# Patient Record
Sex: FEMALE | Race: WHITE | NOT HISPANIC OR LATINO | Employment: OTHER | URBAN - METROPOLITAN AREA
[De-identification: names, ages, dates, MRNs, and addresses within clinical notes are randomized per-mention and may not be internally consistent; named-entity substitution may affect disease eponyms.]

---

## 2020-11-12 PROBLEM — Z12.31 BREAST CANCER SCREENING BY MAMMOGRAM: Status: ACTIVE | Noted: 2020-11-12

## 2020-11-12 PROBLEM — N32.81 OAB (OVERACTIVE BLADDER): Status: ACTIVE | Noted: 2020-11-12

## 2021-02-11 PROBLEM — K22.70 BARRETT'S ESOPHAGUS: Status: ACTIVE | Noted: 2021-02-11

## 2021-02-11 PROBLEM — K21.00 GASTROESOPHAGEAL REFLUX DISEASE WITH ESOPHAGITIS: Status: ACTIVE | Noted: 2021-02-11

## 2023-02-23 ENCOUNTER — HOSPITAL ENCOUNTER (INPATIENT)
Facility: MEDICAL CENTER | Age: 73
LOS: 17 days | DRG: 871 | End: 2023-03-12
Attending: STUDENT IN AN ORGANIZED HEALTH CARE EDUCATION/TRAINING PROGRAM | Admitting: HOSPITALIST
Payer: MEDICARE

## 2023-02-23 DIAGNOSIS — K68.12 PSOAS ABSCESS (HCC): ICD-10-CM

## 2023-02-23 DIAGNOSIS — K52.9 COLITIS: ICD-10-CM

## 2023-02-23 PROBLEM — M79.89 LEG SWELLING: Status: ACTIVE | Noted: 2023-02-23

## 2023-02-23 PROBLEM — D49.89: Status: ACTIVE | Noted: 2023-02-23

## 2023-02-23 PROBLEM — S30.1XXA HEMATOMA OF RIGHT FLANK: Status: ACTIVE | Noted: 2023-02-23

## 2023-02-23 PROBLEM — G93.40 ACUTE ENCEPHALOPATHY: Status: ACTIVE | Noted: 2023-02-23

## 2023-02-23 PROBLEM — D49.512 NEOPLASM OF LEFT KIDNEY: Status: ACTIVE | Noted: 2023-02-23

## 2023-02-23 PROBLEM — A41.9 SEPSIS (HCC): Status: ACTIVE | Noted: 2023-02-23

## 2023-02-23 LAB
ALBUMIN SERPL BCP-MCNC: 2.9 G/DL (ref 3.2–4.9)
ALBUMIN/GLOB SERPL: 0.8 G/DL
ALP SERPL-CCNC: 91 U/L (ref 30–99)
ALT SERPL-CCNC: 13 U/L (ref 2–50)
ANION GAP SERPL CALC-SCNC: 14 MMOL/L (ref 7–16)
AST SERPL-CCNC: 16 U/L (ref 12–45)
BASOPHILS # BLD AUTO: 0.2 % (ref 0–1.8)
BASOPHILS # BLD: 0.03 K/UL (ref 0–0.12)
BILIRUB SERPL-MCNC: 0.5 MG/DL (ref 0.1–1.5)
BUN SERPL-MCNC: 18 MG/DL (ref 8–22)
CALCIUM ALBUM COR SERPL-MCNC: 11 MG/DL (ref 8.5–10.5)
CALCIUM SERPL-MCNC: 10.1 MG/DL (ref 8.5–10.5)
CHLORIDE SERPL-SCNC: 98 MMOL/L (ref 96–112)
CO2 SERPL-SCNC: 20 MMOL/L (ref 20–33)
CREAT SERPL-MCNC: 0.69 MG/DL (ref 0.5–1.4)
EOSINOPHIL # BLD AUTO: 0.02 K/UL (ref 0–0.51)
EOSINOPHIL NFR BLD: 0.1 % (ref 0–6.9)
ERYTHROCYTE [DISTWIDTH] IN BLOOD BY AUTOMATED COUNT: 48.9 FL (ref 35.9–50)
GFR SERPLBLD CREATININE-BSD FMLA CKD-EPI: 92 ML/MIN/1.73 M 2
GLOBULIN SER CALC-MCNC: 3.7 G/DL (ref 1.9–3.5)
GLUCOSE SERPL-MCNC: 115 MG/DL (ref 65–99)
HCT VFR BLD AUTO: 28 % (ref 37–47)
HGB BLD-MCNC: 9.4 G/DL (ref 12–16)
IMM GRANULOCYTES # BLD AUTO: 0.19 K/UL (ref 0–0.11)
IMM GRANULOCYTES NFR BLD AUTO: 1.2 % (ref 0–0.9)
INR PPP: 1.32 (ref 0.87–1.13)
LACTATE SERPL-SCNC: 1.3 MMOL/L (ref 0.5–2)
LYMPHOCYTES # BLD AUTO: 1.07 K/UL (ref 1–4.8)
LYMPHOCYTES NFR BLD: 6.5 % (ref 22–41)
MCH RBC QN AUTO: 25.8 PG (ref 27–33)
MCHC RBC AUTO-ENTMCNC: 33.6 G/DL (ref 33.6–35)
MCV RBC AUTO: 76.9 FL (ref 81.4–97.8)
MONOCYTES # BLD AUTO: 0.95 K/UL (ref 0–0.85)
MONOCYTES NFR BLD AUTO: 5.8 % (ref 0–13.4)
NEUTROPHILS # BLD AUTO: 14.12 K/UL (ref 2–7.15)
NEUTROPHILS NFR BLD: 86.2 % (ref 44–72)
NRBC # BLD AUTO: 0 K/UL
NRBC BLD-RTO: 0 /100 WBC
PLATELET # BLD AUTO: 374 K/UL (ref 164–446)
PMV BLD AUTO: 10.9 FL (ref 9–12.9)
POTASSIUM SERPL-SCNC: 3.4 MMOL/L (ref 3.6–5.5)
PROT SERPL-MCNC: 6.6 G/DL (ref 6–8.2)
PROTHROMBIN TIME: 16.2 SEC (ref 12–14.6)
RBC # BLD AUTO: 3.64 M/UL (ref 4.2–5.4)
SODIUM SERPL-SCNC: 132 MMOL/L (ref 135–145)
WBC # BLD AUTO: 16.4 K/UL (ref 4.8–10.8)

## 2023-02-23 PROCEDURE — 770004 HCHG ROOM/CARE - ONCOLOGY PRIVATE *

## 2023-02-23 PROCEDURE — 700102 HCHG RX REV CODE 250 W/ 637 OVERRIDE(OP): Performed by: HOSPITALIST

## 2023-02-23 PROCEDURE — 99223 1ST HOSP IP/OBS HIGH 75: CPT | Performed by: HOSPITALIST

## 2023-02-23 PROCEDURE — 85610 PROTHROMBIN TIME: CPT

## 2023-02-23 PROCEDURE — 700105 HCHG RX REV CODE 258: Performed by: HOSPITALIST

## 2023-02-23 PROCEDURE — A9270 NON-COVERED ITEM OR SERVICE: HCPCS | Performed by: HOSPITALIST

## 2023-02-23 PROCEDURE — 700111 HCHG RX REV CODE 636 W/ 250 OVERRIDE (IP): Performed by: HOSPITALIST

## 2023-02-23 PROCEDURE — 80053 COMPREHEN METABOLIC PANEL: CPT | Mod: 91

## 2023-02-23 PROCEDURE — 83605 ASSAY OF LACTIC ACID: CPT

## 2023-02-23 PROCEDURE — 85025 COMPLETE CBC W/AUTO DIFF WBC: CPT | Mod: 91

## 2023-02-23 PROCEDURE — 99221 1ST HOSP IP/OBS SF/LOW 40: CPT | Performed by: SURGERY

## 2023-02-23 RX ORDER — POLYETHYLENE GLYCOL 3350 17 G/17G
1 POWDER, FOR SOLUTION ORAL
Status: DISCONTINUED | OUTPATIENT
Start: 2023-02-23 | End: 2023-02-28

## 2023-02-23 RX ORDER — MORPHINE SULFATE 4 MG/ML
2 INJECTION INTRAVENOUS
Status: DISCONTINUED | OUTPATIENT
Start: 2023-02-23 | End: 2023-02-28

## 2023-02-23 RX ORDER — OXYBUTYNIN CHLORIDE 10 MG/1
10 TABLET, EXTENDED RELEASE ORAL DAILY
Status: DISCONTINUED | OUTPATIENT
Start: 2023-02-24 | End: 2023-02-27

## 2023-02-23 RX ORDER — ACETAMINOPHEN 325 MG/1
650 TABLET ORAL EVERY 6 HOURS PRN
Status: DISCONTINUED | OUTPATIENT
Start: 2023-02-23 | End: 2023-03-12 | Stop reason: HOSPADM

## 2023-02-23 RX ORDER — OXYCODONE HYDROCHLORIDE 5 MG/1
5 TABLET ORAL
Status: DISCONTINUED | OUTPATIENT
Start: 2023-02-23 | End: 2023-02-28

## 2023-02-23 RX ORDER — SODIUM CHLORIDE, SODIUM LACTATE, POTASSIUM CHLORIDE, AND CALCIUM CHLORIDE .6; .31; .03; .02 G/100ML; G/100ML; G/100ML; G/100ML
500 INJECTION, SOLUTION INTRAVENOUS
Status: DISCONTINUED | OUTPATIENT
Start: 2023-02-23 | End: 2023-02-25

## 2023-02-23 RX ORDER — SERTRALINE HYDROCHLORIDE 100 MG/1
100 TABLET, FILM COATED ORAL 2 TIMES DAILY
Status: DISCONTINUED | OUTPATIENT
Start: 2023-02-24 | End: 2023-03-12 | Stop reason: HOSPADM

## 2023-02-23 RX ORDER — ONDANSETRON 2 MG/ML
4 INJECTION INTRAMUSCULAR; INTRAVENOUS EVERY 4 HOURS PRN
Status: DISCONTINUED | OUTPATIENT
Start: 2023-02-23 | End: 2023-03-12 | Stop reason: HOSPADM

## 2023-02-23 RX ORDER — SODIUM CHLORIDE, SODIUM LACTATE, POTASSIUM CHLORIDE, CALCIUM CHLORIDE 600; 310; 30; 20 MG/100ML; MG/100ML; MG/100ML; MG/100ML
INJECTION, SOLUTION INTRAVENOUS CONTINUOUS
Status: DISCONTINUED | OUTPATIENT
Start: 2023-02-23 | End: 2023-02-24

## 2023-02-23 RX ORDER — OXYCODONE HYDROCHLORIDE 5 MG/1
2.5 TABLET ORAL
Status: DISCONTINUED | OUTPATIENT
Start: 2023-02-23 | End: 2023-02-28

## 2023-02-23 RX ORDER — BISACODYL 10 MG
10 SUPPOSITORY, RECTAL RECTAL
Status: DISCONTINUED | OUTPATIENT
Start: 2023-02-23 | End: 2023-02-28

## 2023-02-23 RX ORDER — ONDANSETRON 4 MG/1
4 TABLET, ORALLY DISINTEGRATING ORAL EVERY 4 HOURS PRN
Status: DISCONTINUED | OUTPATIENT
Start: 2023-02-23 | End: 2023-03-12 | Stop reason: HOSPADM

## 2023-02-23 RX ORDER — FAMOTIDINE 20 MG/1
20 TABLET, FILM COATED ORAL DAILY
Status: DISCONTINUED | OUTPATIENT
Start: 2023-02-24 | End: 2023-02-24

## 2023-02-23 RX ORDER — AMOXICILLIN 250 MG
2 CAPSULE ORAL 2 TIMES DAILY
Status: DISCONTINUED | OUTPATIENT
Start: 2023-02-23 | End: 2023-02-26

## 2023-02-23 RX ADMIN — SENNOSIDES AND DOCUSATE SODIUM 2 TABLET: 50; 8.6 TABLET ORAL at 23:47

## 2023-02-23 RX ADMIN — PIPERACILLIN AND TAZOBACTAM 4.5 G: 4; .5 INJECTION, POWDER, LYOPHILIZED, FOR SOLUTION INTRAVENOUS; PARENTERAL at 23:52

## 2023-02-23 RX ADMIN — OXYCODONE 5 MG: 5 TABLET ORAL at 23:47

## 2023-02-23 ASSESSMENT — COGNITIVE AND FUNCTIONAL STATUS - GENERAL
STANDING UP FROM CHAIR USING ARMS: A LITTLE
SUGGESTED CMS G CODE MODIFIER MOBILITY: CK
DRESSING REGULAR UPPER BODY CLOTHING: A LITTLE
PERSONAL GROOMING: A LITTLE
MOVING TO AND FROM BED TO CHAIR: A LITTLE
TURNING FROM BACK TO SIDE WHILE IN FLAT BAD: A LITTLE
CLIMB 3 TO 5 STEPS WITH RAILING: A LITTLE
WALKING IN HOSPITAL ROOM: A LITTLE
TOILETING: A LITTLE
MOVING FROM LYING ON BACK TO SITTING ON SIDE OF FLAT BED: A LITTLE
SUGGESTED CMS G CODE MODIFIER DAILY ACTIVITY: CK
HELP NEEDED FOR BATHING: A LITTLE
DRESSING REGULAR LOWER BODY CLOTHING: A LOT
DAILY ACTIVITIY SCORE: 18
MOBILITY SCORE: 18

## 2023-02-23 ASSESSMENT — PAIN DESCRIPTION - PAIN TYPE: TYPE: ACUTE PAIN

## 2023-02-24 ENCOUNTER — HOSPITAL ENCOUNTER (OUTPATIENT)
Dept: RADIOLOGY | Facility: MEDICAL CENTER | Age: 73
End: 2023-02-24

## 2023-02-24 ENCOUNTER — APPOINTMENT (OUTPATIENT)
Dept: RADIOLOGY | Facility: MEDICAL CENTER | Age: 73
DRG: 871 | End: 2023-02-24
Attending: HOSPITALIST
Payer: MEDICARE

## 2023-02-24 PROBLEM — E87.6 HYPOKALEMIA: Status: ACTIVE | Noted: 2023-02-24

## 2023-02-24 PROBLEM — F10.931 ALCOHOL WITHDRAWAL SYNDROME, WITH DELIRIUM (HCC): Status: ACTIVE | Noted: 2023-02-24

## 2023-02-24 PROBLEM — G93.41 SEPSIS WITH ENCEPHALOPATHY WITHOUT SEPTIC SHOCK (HCC): Status: ACTIVE | Noted: 2023-02-23

## 2023-02-24 PROBLEM — D50.9 MICROCYTIC ANEMIA: Status: ACTIVE | Noted: 2023-02-24

## 2023-02-24 PROBLEM — E87.1 HYPONATREMIA: Status: ACTIVE | Noted: 2023-02-24

## 2023-02-24 PROBLEM — R19.00 RETROPERITONEAL MASS: Status: ACTIVE | Noted: 2023-02-24

## 2023-02-24 PROBLEM — R65.20 SEPSIS WITH ENCEPHALOPATHY WITHOUT SEPTIC SHOCK (HCC): Status: ACTIVE | Noted: 2023-02-23

## 2023-02-24 LAB
EST. AVERAGE GLUCOSE BLD GHB EST-MCNC: 111 MG/DL
HBA1C MFR BLD: 5.5 % (ref 4–5.6)
HIV 1+2 AB+HIV1 P24 AG SERPL QL IA: NORMAL
IRON SATN MFR SERPL: 8 % (ref 15–55)
IRON SERPL-MCNC: 12 UG/DL (ref 40–170)
LACTATE SERPL-SCNC: 1.1 MMOL/L (ref 0.5–2)
LACTATE SERPL-SCNC: 1.2 MMOL/L (ref 0.5–2)
LACTATE SERPL-SCNC: 1.2 MMOL/L (ref 0.5–2)
MAGNESIUM SERPL-MCNC: 1.5 MG/DL (ref 1.5–2.5)
TIBC SERPL-MCNC: 160 UG/DL (ref 250–450)
UIBC SERPL-MCNC: 148 UG/DL (ref 110–370)

## 2023-02-24 PROCEDURE — 87070 CULTURE OTHR SPECIMN AEROBIC: CPT

## 2023-02-24 PROCEDURE — 83605 ASSAY OF LACTIC ACID: CPT | Mod: 91

## 2023-02-24 PROCEDURE — 93970 EXTREMITY STUDY: CPT

## 2023-02-24 PROCEDURE — 87186 SC STD MICRODIL/AGAR DIL: CPT

## 2023-02-24 PROCEDURE — 83540 ASSAY OF IRON: CPT

## 2023-02-24 PROCEDURE — 700111 HCHG RX REV CODE 636 W/ 250 OVERRIDE (IP): Performed by: HOSPITALIST

## 2023-02-24 PROCEDURE — 87075 CULTR BACTERIA EXCEPT BLOOD: CPT

## 2023-02-24 PROCEDURE — 99232 SBSQ HOSP IP/OBS MODERATE 35: CPT | Performed by: SURGERY

## 2023-02-24 PROCEDURE — 700111 HCHG RX REV CODE 636 W/ 250 OVERRIDE (IP): Performed by: STUDENT IN AN ORGANIZED HEALTH CARE EDUCATION/TRAINING PROGRAM

## 2023-02-24 PROCEDURE — 87077 CULTURE AEROBIC IDENTIFY: CPT

## 2023-02-24 PROCEDURE — 83036 HEMOGLOBIN GLYCOSYLATED A1C: CPT

## 2023-02-24 PROCEDURE — 4410114 CT-DRAIN-RETROPERITONEAL

## 2023-02-24 PROCEDURE — 700105 HCHG RX REV CODE 258: Performed by: HOSPITALIST

## 2023-02-24 PROCEDURE — 82728 ASSAY OF FERRITIN: CPT

## 2023-02-24 PROCEDURE — 0K9N30Z DRAINAGE OF RIGHT HIP MUSCLE WITH DRAINAGE DEVICE, PERCUTANEOUS APPROACH: ICD-10-PCS | Performed by: RADIOLOGY

## 2023-02-24 PROCEDURE — 700111 HCHG RX REV CODE 636 W/ 250 OVERRIDE (IP)

## 2023-02-24 PROCEDURE — 700102 HCHG RX REV CODE 250 W/ 637 OVERRIDE(OP): Performed by: HOSPITALIST

## 2023-02-24 PROCEDURE — 700111 HCHG RX REV CODE 636 W/ 250 OVERRIDE (IP): Performed by: RADIOLOGY

## 2023-02-24 PROCEDURE — 87205 SMEAR GRAM STAIN: CPT

## 2023-02-24 PROCEDURE — 87389 HIV-1 AG W/HIV-1&-2 AB AG IA: CPT

## 2023-02-24 PROCEDURE — 770004 HCHG ROOM/CARE - ONCOLOGY PRIVATE *

## 2023-02-24 PROCEDURE — 83735 ASSAY OF MAGNESIUM: CPT

## 2023-02-24 PROCEDURE — A9270 NON-COVERED ITEM OR SERVICE: HCPCS | Performed by: HOSPITALIST

## 2023-02-24 PROCEDURE — 99233 SBSQ HOSP IP/OBS HIGH 50: CPT | Performed by: STUDENT IN AN ORGANIZED HEALTH CARE EDUCATION/TRAINING PROGRAM

## 2023-02-24 PROCEDURE — 700101 HCHG RX REV CODE 250: Performed by: STUDENT IN AN ORGANIZED HEALTH CARE EDUCATION/TRAINING PROGRAM

## 2023-02-24 PROCEDURE — HZ2ZZZZ DETOXIFICATION SERVICES FOR SUBSTANCE ABUSE TREATMENT: ICD-10-PCS | Performed by: STUDENT IN AN ORGANIZED HEALTH CARE EDUCATION/TRAINING PROGRAM

## 2023-02-24 PROCEDURE — 36415 COLL VENOUS BLD VENIPUNCTURE: CPT

## 2023-02-24 PROCEDURE — 83550 IRON BINDING TEST: CPT

## 2023-02-24 PROCEDURE — 74018 RADEX ABDOMEN 1 VIEW: CPT

## 2023-02-24 RX ORDER — OMEPRAZOLE 20 MG/1
40 CAPSULE, DELAYED RELEASE ORAL DAILY
Status: DISCONTINUED | OUTPATIENT
Start: 2023-02-25 | End: 2023-03-04

## 2023-02-24 RX ORDER — POTASSIUM CHLORIDE 20 MEQ/1
40 TABLET, EXTENDED RELEASE ORAL ONCE
Status: COMPLETED | OUTPATIENT
Start: 2023-02-24 | End: 2023-02-24

## 2023-02-24 RX ORDER — LORAZEPAM 1 MG/1
1 TABLET ORAL EVERY 4 HOURS PRN
Status: DISCONTINUED | OUTPATIENT
Start: 2023-02-24 | End: 2023-03-01

## 2023-02-24 RX ORDER — SODIUM CHLORIDE 9 MG/ML
500 INJECTION, SOLUTION INTRAVENOUS
Status: ACTIVE | OUTPATIENT
Start: 2023-02-24 | End: 2023-02-24

## 2023-02-24 RX ORDER — LORAZEPAM 2 MG/ML
0.5 INJECTION INTRAMUSCULAR EVERY 4 HOURS PRN
Status: DISCONTINUED | OUTPATIENT
Start: 2023-02-24 | End: 2023-03-01

## 2023-02-24 RX ORDER — MIDAZOLAM HYDROCHLORIDE 1 MG/ML
INJECTION INTRAMUSCULAR; INTRAVENOUS
Status: COMPLETED
Start: 2023-02-24 | End: 2023-02-24

## 2023-02-24 RX ORDER — LORAZEPAM 0.5 MG/1
0.5 TABLET ORAL EVERY 4 HOURS PRN
Status: DISCONTINUED | OUTPATIENT
Start: 2023-02-24 | End: 2023-02-25

## 2023-02-24 RX ORDER — CHOLECALCIFEROL (VITAMIN D3) 125 MCG
5 CAPSULE ORAL NIGHTLY
Status: DISCONTINUED | OUTPATIENT
Start: 2023-02-24 | End: 2023-03-12 | Stop reason: HOSPADM

## 2023-02-24 RX ORDER — LORAZEPAM 2 MG/ML
2 INJECTION INTRAMUSCULAR
Status: DISCONTINUED | OUTPATIENT
Start: 2023-02-24 | End: 2023-03-01

## 2023-02-24 RX ORDER — LORAZEPAM 2 MG/1
2 TABLET ORAL
Status: DISCONTINUED | OUTPATIENT
Start: 2023-02-24 | End: 2023-03-01

## 2023-02-24 RX ORDER — LORAZEPAM 2 MG/1
4 TABLET ORAL
Status: DISCONTINUED | OUTPATIENT
Start: 2023-02-24 | End: 2023-03-01

## 2023-02-24 RX ORDER — LORAZEPAM 2 MG/ML
1.5 INJECTION INTRAMUSCULAR
Status: DISCONTINUED | OUTPATIENT
Start: 2023-02-24 | End: 2023-03-01

## 2023-02-24 RX ORDER — QUETIAPINE FUMARATE 25 MG/1
50 TABLET, FILM COATED ORAL NIGHTLY
Status: DISCONTINUED | OUTPATIENT
Start: 2023-02-24 | End: 2023-03-12 | Stop reason: HOSPADM

## 2023-02-24 RX ORDER — MIDAZOLAM HYDROCHLORIDE 1 MG/ML
.5-2 INJECTION INTRAMUSCULAR; INTRAVENOUS PRN
Status: ACTIVE | OUTPATIENT
Start: 2023-02-24 | End: 2023-02-24

## 2023-02-24 RX ORDER — ACETAMINOPHEN 325 MG/1
650 TABLET ORAL EVERY 6 HOURS PRN
Status: DISCONTINUED | OUTPATIENT
Start: 2023-02-24 | End: 2023-02-24

## 2023-02-24 RX ORDER — FOLIC ACID 1 MG/1
1 TABLET ORAL DAILY
Status: DISPENSED | OUTPATIENT
Start: 2023-02-25 | End: 2023-03-01

## 2023-02-24 RX ORDER — LORAZEPAM 2 MG/ML
1 INJECTION INTRAMUSCULAR
Status: DISCONTINUED | OUTPATIENT
Start: 2023-02-24 | End: 2023-03-01

## 2023-02-24 RX ORDER — ONDANSETRON 2 MG/ML
4 INJECTION INTRAMUSCULAR; INTRAVENOUS PRN
Status: ACTIVE | OUTPATIENT
Start: 2023-02-24 | End: 2023-02-24

## 2023-02-24 RX ORDER — GAUZE BANDAGE 2" X 2"
100 BANDAGE TOPICAL DAILY
Status: DISPENSED | OUTPATIENT
Start: 2023-02-25 | End: 2023-03-01

## 2023-02-24 RX ADMIN — FENTANYL CITRATE 25 MCG: 50 INJECTION, SOLUTION INTRAMUSCULAR; INTRAVENOUS at 16:52

## 2023-02-24 RX ADMIN — MIDAZOLAM HYDROCHLORIDE 1 MG: 1 INJECTION, SOLUTION INTRAMUSCULAR; INTRAVENOUS at 16:55

## 2023-02-24 RX ADMIN — OXYCODONE 5 MG: 5 TABLET ORAL at 03:20

## 2023-02-24 RX ADMIN — MORPHINE SULFATE 2 MG: 4 INJECTION INTRAVENOUS at 10:07

## 2023-02-24 RX ADMIN — SERTRALINE 100 MG: 100 TABLET, FILM COATED ORAL at 05:28

## 2023-02-24 RX ADMIN — PIPERACILLIN AND TAZOBACTAM 4.5 G: 4; .5 INJECTION, POWDER, LYOPHILIZED, FOR SOLUTION INTRAVENOUS; PARENTERAL at 14:11

## 2023-02-24 RX ADMIN — PIPERACILLIN AND TAZOBACTAM 4.5 G: 4; .5 INJECTION, POWDER, LYOPHILIZED, FOR SOLUTION INTRAVENOUS; PARENTERAL at 22:28

## 2023-02-24 RX ADMIN — FENTANYL CITRATE 50 MCG: 50 INJECTION, SOLUTION INTRAMUSCULAR; INTRAVENOUS at 17:00

## 2023-02-24 RX ADMIN — MIDAZOLAM HYDROCHLORIDE 1 MG: 1 INJECTION, SOLUTION INTRAMUSCULAR; INTRAVENOUS at 16:52

## 2023-02-24 RX ADMIN — PIPERACILLIN AND TAZOBACTAM 4.5 G: 4; .5 INJECTION, POWDER, LYOPHILIZED, FOR SOLUTION INTRAVENOUS; PARENTERAL at 03:20

## 2023-02-24 RX ADMIN — POTASSIUM CHLORIDE 40 MEQ: 1500 TABLET, EXTENDED RELEASE ORAL at 05:28

## 2023-02-24 RX ADMIN — SENNOSIDES AND DOCUSATE SODIUM 2 TABLET: 50; 8.6 TABLET ORAL at 05:28

## 2023-02-24 RX ADMIN — FAMOTIDINE 20 MG: 20 TABLET, FILM COATED ORAL at 05:28

## 2023-02-24 RX ADMIN — THIAMINE HYDROCHLORIDE: 100 INJECTION, SOLUTION INTRAMUSCULAR; INTRAVENOUS at 18:25

## 2023-02-24 RX ADMIN — LORAZEPAM 0.5 MG: 2 INJECTION INTRAMUSCULAR; INTRAVENOUS at 12:15

## 2023-02-24 RX ADMIN — SODIUM CHLORIDE, POTASSIUM CHLORIDE, SODIUM LACTATE AND CALCIUM CHLORIDE: 600; 310; 30; 20 INJECTION, SOLUTION INTRAVENOUS at 00:02

## 2023-02-24 RX ADMIN — FENTANYL CITRATE 25 MCG: 50 INJECTION, SOLUTION INTRAMUSCULAR; INTRAVENOUS at 16:55

## 2023-02-24 ASSESSMENT — PATIENT HEALTH QUESTIONNAIRE - PHQ9
SUM OF ALL RESPONSES TO PHQ9 QUESTIONS 1 AND 2: 0
2. FEELING DOWN, DEPRESSED, IRRITABLE, OR HOPELESS: NOT AT ALL
1. LITTLE INTEREST OR PLEASURE IN DOING THINGS: NOT AT ALL

## 2023-02-24 ASSESSMENT — LIFESTYLE VARIABLES
AVERAGE NUMBER OF DAYS PER WEEK YOU HAVE A DRINK CONTAINING ALCOHOL: 0.5
AUDITORY DISTURBANCES: NOT PRESENT
TOTAL SCORE: 10
EVER FELT BAD OR GUILTY ABOUT YOUR DRINKING: NO
PAROXYSMAL SWEATS: NO SWEAT VISIBLE
ORIENTATION AND CLOUDING OF SENSORIUM: DATE DISORIENTATION BY MORE THAN TWO CALENDAR DAYS
DOES PATIENT WANT TO STOP DRINKING: NO
ON A TYPICAL DAY WHEN YOU DRINK ALCOHOL HOW MANY DRINKS DO YOU HAVE: 0
TOTAL SCORE: 0
EVER HAD A DRINK FIRST THING IN THE MORNING TO STEADY YOUR NERVES TO GET RID OF A HANGOVER: NO
VISUAL DISTURBANCES: NOT PRESENT
VISUAL DISTURBANCES: NOT PRESENT
HEADACHE, FULLNESS IN HEAD: NOT PRESENT
TOTAL SCORE: 4
PAROXYSMAL SWEATS: NO SWEAT VISIBLE
AGITATION: NORMAL ACTIVITY
TREMOR: *
ALCOHOL_USE: YES
HAVE YOU EVER FELT YOU SHOULD CUT DOWN ON YOUR DRINKING: NO
AGITATION: SOMEWHAT MORE THAN NORMAL ACTIVITY
NAUSEA AND VOMITING: NO NAUSEA AND NO VOMITING
ANXIETY: NO ANXIETY (AT EASE)
NAUSEA AND VOMITING: NO NAUSEA AND NO VOMITING
TOTAL SCORE: 0
ANXIETY: *
AUDITORY DISTURBANCES: NOT PRESENT
TREMOR: TREMOR NOT VISIBLE BUT CAN BE FELT, FINGERTIP TO FINGERTIP
ORIENTATION AND CLOUDING OF SENSORIUM: DATE DISORIENTATION BY MORE THAN TWO CALENDAR DAYS
HEADACHE, FULLNESS IN HEAD: NOT PRESENT
HAVE PEOPLE ANNOYED YOU BY CRITICIZING YOUR DRINKING: NO
CONSUMPTION TOTAL: NEGATIVE
TOTAL SCORE: 0
HOW MANY TIMES IN THE PAST YEAR HAVE YOU HAD 5 OR MORE DRINKS IN A DAY: 0

## 2023-02-24 ASSESSMENT — PAIN DESCRIPTION - PAIN TYPE
TYPE: ACUTE PAIN
TYPE: ACUTE PAIN

## 2023-02-24 NOTE — ASSESSMENT & PLAN NOTE
This is Sepsis Present on admission  SIRS criteria identified on my evaluation include: Fever, with temperature greater than 101 deg F, Tachypnea, with respirations greater than 20 per minute and Leukocytosis, with WBC greater than 12,000  Source is right psoas abscess  Sepsis protocol initiated  Fluid resuscitation ordered per protocol  Crystalloid Fluid Administration: Fluid resuscitation ordered per standard protocol - 30 mL/kg per current or ideal body weight  IV antibiotics as appropriate for source of sepsis  Reassessment: I have reassessed the patient's hemodynamic status    BCx 2/23: NGTD  Abscess Cx 2/24: +Citrobacter koseri  Continue zosyn for intra-abdominal abscess

## 2023-02-24 NOTE — PROGRESS NOTES
[facility] TRIAGE OFFICER DIRECT ADMISSION REPORT  Transferring facility: [facility]  Transferring physician: Dr Lopez  Transferring facility/physician contact number:   Chief complaint: Right sided flank pain and leg swelling  Pertinent history & patient course: 71 yo F s/p renal biopsy after large renal mass found presents with worsening right flank swelling. Biopsy in the past was concerning for abscess. Seen by oncologist Dr Campos today who requested repeat biopsy if mass is larger. Patient reports weight loss  Pertinent imaging & lab results: WBC 18.8, Na 127  Code Status: full code per transferring provider, I personally verified with the transferring provider patient's code status and the transferring provider has confirmed this with the patient.  Further work up or recommendations per triage officer prior to transfer: none  Consultants called prior to transfer and pertinent input from consultants: none  Patient accepted for transfer: Yes  Consultants to be called upon arrival:   Admission status: Inpatient.   Floor requested: medical  If ICU transfer, name of intensivist case discussed with and pertinent input from critical care: n/a    Please inform the triage officer upon arrival of the patient to [facility] for assignment of a hospitalist to perform admission.     For any question or concerns regarding the care of this patient, please reach out to the assigned hospitalist.

## 2023-02-24 NOTE — ASSESSMENT & PLAN NOTE
Resolved  Likely multifactorial including underlying abscess and EtOH withdrawal - see separate plans  Nonfocal, CTH deferred

## 2023-02-24 NOTE — H&P
Hospital Medicine History & Physical Note    Date of Service  2/23/2023    Primary Care Physician  Lg Mata M.D.    Consultants  general surgery    Specialist Names:      Code Status  Full Code    Chief Complaint  Right flank pain    History of Presenting Illness  Mena Campos is a 72 y.o. female who presented 2/23/2023 with past medical history of repeated urine infection who comes into the hospital for right-sided flank pain.  This has been occurring for the past 2 months.  She was found to have right-sided kidney mass on January 10.  She had a biopsy of this mass on February 6.  She went to visit an oncologist today Dr. Campos who saw the biopsy results and noticed it being an abscess.  He sent her to the emergency room right away.  Repeat CT scan was completed found a psoas abscess.  Patient is confused on my examination and the history was taken by  on the phone.  The  states that her only complaints is pain and swelling on the right side.  Patient is also not eating well at home.      I discussed the plan of care with patient.    Review of Systems  Review of Systems   Unable to perform ROS: Acuity of condition     Past Medical History   has a past medical history of Zheng's esophagus, Cataract, Depression, Fracture of humeral head, right, closed (2005), Fracture, femur closed, shaft (HCC) (8/12/2013), GERD (gastroesophageal reflux disease), H/O measles, History of chickenpox, IBD (inflammatory bowel disease), Kyphosis, OSTEOPOROSIS, and Wrist fracture, left (2004).    Surgical History   has a past surgical history that includes orif, fracture, femur (8/12/2013); orif, fracture, humerus, proximal (Right); and cataract extraction with iol (Bilateral, 3/2016).     Family History  family history includes Cancer in her father; Osteoporosis in her mother.   Family history reviewed with patient. There is no family history that is pertinent to the chief complaint.     Social  History   reports that she quit smoking about 13 years ago. Her smoking use included cigarettes. She has a 17.50 pack-year smoking history. She has never used smokeless tobacco. She reports current alcohol use of about 8.4 oz per week. She reports that she does not use drugs.    Allergies  Allergies   Allergen Reactions    Seasonal     Wellbutrin [Bupropion Hcl]      Had a siezure       Medications  Prior to Admission Medications   Prescriptions Last Dose Informant Patient Reported? Taking?   B Complex Vitamins (B COMPLEX 50) Tab   Yes No   Sig: Take 1 tablet by mouth 2 times a day.   CALCIUM-PHOSPHORUS PO  Patient Yes No   Sig: Take 500 mg by mouth 3 times a day with meals. Indications: [facility]hay labs   Cholecalciferol (VITAMIN D3) 2000 units Tab  Patient Yes No   Sig: Take 4,000 Units by mouth every day.   FIBER PO   Yes No   Sig: Take  by mouth.   METAMUCIL FIBER PO   Yes No   Sig: Take  by mouth.   Multiple Vitamin (MULTIVITAMIN PO)   Yes No   Omega-3 Fatty Acids (FISH OIL) 1200 MG Cap   Yes No   Sig: Take 2 Capsules by mouth every day.   Potassium 99 MG Tab   Yes No   Sig: Take 2 Tablets by mouth every day.   Zinc 50 MG Cap   Yes No   Sig: Take 50 mg by mouth every day.   ascorbic acid (ASCORBIC ACID) 500 MG Tab   Yes No   Sig: Take 500 mg by mouth 3 times a day.   famotidine (PEPCID) 20 MG Tab   No No   Sig: TAKE 1 TABLET BY MOUTH 3 TIMES A DAY.   nitrofurantoin (MACROBID) 100 MG Cap   No No   Sig: Take 1 Capsule by mouth 2 times a day.   oxyCODONE-acetaminophen (PERCOCET-10)  MG Tab   No No   Sig: Take 1 Tablet by mouth every 6 hours as needed for Severe Pain for up to 30 days.   sertraline (ZOLOFT) 100 MG Tab   No No   Sig: Take 1 Tablet by mouth 2 times a day.   tolterodine ER (DETROL LA) 4 MG CAPSULE SR 24 HR   No No   Sig: Take 1 Capsule by mouth 2 times a day.   vitamin e (VITAMIN E) 400 UNIT Cap   Yes No   Sig: Take 400 Units by mouth every day.      Facility-Administered Medications: None        Physical Exam  Temp:  [36.2 °C (97.1 °F)-37.7 °C (99.8 °F)] 37.2 °C (99 °F)  Pulse:  [] 112  Resp:  [15-22] 22  BP: ()/(56-92) 129/56  SpO2:  [88 %-100 %] 93 %  Blood Pressure : 129/56   Temperature: 37.2 °C (99 °F)   Pulse: (!) 112   Respiration: (!) 22   Pulse Oximetry: 93 %       Physical Exam  Vitals and nursing note reviewed.   Constitutional:       General: She is not in acute distress.     Appearance: Normal appearance. She is not ill-appearing, toxic-appearing or diaphoretic.   HENT:      Head: Normocephalic and atraumatic.      Nose: No congestion or rhinorrhea.      Mouth/Throat:      Pharynx: No oropharyngeal exudate or posterior oropharyngeal erythema.   Eyes:      General: No scleral icterus.  Neck:      Vascular: No carotid bruit or JVD.   Cardiovascular:      Rate and Rhythm: Normal rate and regular rhythm.      Pulses: Normal pulses.      Heart sounds: Normal heart sounds. No murmur heard.    No friction rub. No gallop.   Pulmonary:      Effort: Pulmonary effort is normal. No respiratory distress.      Breath sounds: No stridor. No wheezing, rhonchi or rales.   Abdominal:      General: Abdomen is flat. There is distension.      Palpations: There is no mass.      Tenderness: There is abdominal tenderness. There is guarding and rebound. There is no left CVA tenderness.      Hernia: No hernia is present.   Musculoskeletal:         General: No swelling. Normal range of motion.      Cervical back: No rigidity. No muscular tenderness.      Right lower leg: Edema present.      Left lower leg: Edema present.   Lymphadenopathy:      Cervical: No cervical adenopathy.   Skin:     General: Skin is warm and dry.      Capillary Refill: Capillary refill takes more than 3 seconds.      Coloration: Skin is not jaundiced or pale.      Findings: No bruising or erythema.   Neurological:      Mental Status: She is alert.       Laboratory:  Recent Labs     02/23/23  1555   WBC 18.8*   RBC 3.71*    HEMOGLOBIN 9.5*   HEMATOCRIT 29.1*   MCV 78.4*   MCH 25.6*   MCHC 32.6*   RDW 17.2*   PLATELETCT 378   MPV 10.5*     Recent Labs     02/23/23  1555   SODIUM 127*   POTASSIUM 4.1   CHLORIDE 92*   CO2 23   GLUCOSE 118*   BUN 22*   CREATININE 0.9   CALCIUM 11.0     Recent Labs     02/23/23  1555   ALTSGPT 17   ASTSGOT 21   ALKPHOSPHAT 104   TBILIRUBIN 0.5   GLUCOSE 118*         No results for input(s): NTPROBNP in the last 72 hours.      No results for input(s): TROPONINT in the last 72 hours.    Imaging:  [id number] VIEW    (Results Pending)   EC-ECHOCARDIOGRAM COMPLETE W/O CONT    (Results Pending)   US-EXTREMITY VENOUS LOWER BILAT    (Results Pending)   IR-DRAIN-RETROPERITONEAL    (Results Pending)       no X-Ray or EKG requiring interpretation    Assessment/Plan:  Justification for Admission Status  I anticipate this patient will require at least two midnights for appropriate medical management, necessitating inpatient admission because right psoas abscess    Patient will need a Med/Surg bed on MEDICAL service .  The need is secondary to right psoas abscess.    * Psoas abscess (HCC)  Assessment & Plan  Pain control  IV Zosyn started  Follow blood cultures  I did consult with surgery who recommended to have an IR drain in place    Leg swelling  Assessment & Plan  Venous Dopplers and cardiac echo has been ordered    Acute encephalopathy  Assessment & Plan  Secondary to infectious etiology    Sepsis (HCC)  Assessment & Plan  This is Sepsis Present on admission  SIRS criteria identified on my evaluation include: Fever, with temperature greater than 101 deg F, Tachypnea, with respirations greater than 20 per minute and Leukocytosis, with WBC greater than 12,000  Source is right psoas abscess  Sepsis protocol initiated  Fluid resuscitation ordered per protocol  Crystalloid Fluid Administration: Fluid resuscitation ordered per standard protocol - 30 mL/kg per current or ideal body weight  IV antibiotics as appropriate  for source of sepsis  Reassessment: I have reassessed the patient's hemodynamic status              VTE prophylaxis: SCDs/TEDs

## 2023-02-24 NOTE — PROGRESS NOTES
4 Eyes Skin Assessment Completed by Leela Delacruz, YARELY and Kelvin Thomas RN.    Head WDL  Ears WDL  Nose WDL  Mouth WDL  Neck WDL  Breast/Chest WDL  Shoulder Blades Redness and Blanching  Spine Redness and Blanching  (R) Arm/Elbow/Hand WDL  (L) Arm/Elbow/Hand WDL  Abdomen WDL  Groin WDL  Scrotum/Coccyx/Buttocks Redness and Blanching  (R) Leg Edema  (L) Leg Edema  (R) Heel/Foot/Toe Edema  (L) Heel/Foot/Toe Edema      Devices In Places Pulse Ox      Interventions In Place Sacral Mepilex    Possible Skin Injury No

## 2023-02-24 NOTE — DISCHARGE PLANNING
"Case Management Discharge Planning    Admission Date: 2/23/2023  GMLOS: 5  ALOS: 1    6-Clicks ADL Score: 18  6-Clicks Mobility Score: 18    Anticipated Discharge Dispo: Home with HH      DME Needed: No    Action(s) Taken: LMSW went into patients room and conducted assessment with  while patient was resting.  confirmed address and insurance.  stated that currently her doctor is Dr. Mata but that he wants to switch her to a \"Dr. Sánchez\" in [place] because [place] is not helpful for his wife.  states that he is patients support system and that they do not have anyone else.  states he would prefer to have his wife home with him than send her to any type of facility. Couple usually stays within the [facility] area.  also disclosed his own health conditions and struggles with strength.     Escalations Completed: None    Medically Clear: No    Next Steps: F/U with medical team for clearance and recommendations.     Barriers to Discharge: Medical clearance and Pending Procedures  Care Transition Team Assessment    Information Source  Orientation Level: Oriented to situation, Oriented to person, Disoriented to time, Disoriented to place  Information Given By: Spouse  Informant's Name: Nickolas    Elopement Risk  Legal Hold: No  Ambulatory or Self Mobile in Wheelchair: No-Not an Elopement Risk  Elopement Risk: Not at Risk for Elopement    Interdisciplinary Discharge Planning  Primary Care Physician: Dr Pollack / wants to switch to  in [place]  Lives with - Patient's Self Care Capacity: Spouse  Support Systems: Spouse / Significant Other  Housing / Facility: 1 Story House  Mobility Issues: Yes  Durable Medical Equipment:  (Has all DME needed)    Discharge Preparedness  What is your plan after discharge?: Home with help  What are your discharge supports?: Spouse  Prior Functional Level: Uses Cane, Uses Walker, Uses Wheelchair    Functional Assesment  Prior " Functional Level: Uses Cane, Uses Walker, Uses Wheelchair    Vision / Hearing Impairment  Vision Impairment : Yes  Right Eye Vision: Wears Glasses  Left Eye Vision: Wears Glasses  Hearing Impairment : No    Domestic Abuse  Have you ever been the victim of abuse or violence?: No  Physical Abuse or Sexual Abuse: No  Verbal Abuse or Emotional Abuse: No  Possible Abuse/Neglect Reported to:: Not Applicable    Discharge Risks or Barriers  Discharge risks or barriers?: Other (comment) (Couple does not have any support outside of each other.)  Patient risk factors: Vulnerable adult    Anticipated Discharge Information  Discharge Disposition: D/T to home under HHA care in anticipation of covered skilled care (06)

## 2023-02-24 NOTE — ASSESSMENT & PLAN NOTE
Presented to [facility] with Right flank pain  CT demonstrated psoas abscess prompting transfer to [facility] for IR  General surgery consulted and s/o, recommended IR-guided drainage  IR-guided drainage 2/24/23, Repeat CT 2/28/23  - requested RN flushes BID and record output  Abscess culture +Citrobacter koseri, pan-susceptible  Continue zosyn for now  ID consulted for antibiotic planning after source control  HIV negative    Drain discontinued 3/1  Repeat drain replaced 3/7  Drain fell out 3/10    Repeat CT AP reviewed: showed decreasing abscesses, nothing drainable per IR  Will plan for another 2 weeks of abx then follow up CT AP with ID since no PCP currently

## 2023-02-24 NOTE — DIETARY
"Nutrition services: Day 1 of admit.  Mena Campos is a 72 y.o. female admitted with Psoas abscess awaiting IR drain  History includes recent right flank pain and urinary infections, Zheng's esophagus, Depression, cataract, GERD, IBD, osteoporosis, fractures  Patient with low BMI, weight loss and decreased appetite noted on admit screen.    Patient sleeping at time of RD visit.   at bedside and stated she is in a lot of pain and sleeps with pain medicine.  He stated she weighed 115# approximately 2 months ago and has just not been able to eat recently due to pain.     Assessment:  Height and weight per ER visit chart (2/23/23)  Height 66\"  Weight: 97# per bed scale  BMI:  15.66    Diet/Intake: NPO, awaiting procedure    Evaluation:   Sodium 132, K+ 3.4  Detox IVF (D5LR, magnesium, thiamine and folic acid), thiamine, multivitamin, folic acid  Patient at risk for refeeding - lab order placed      Malnutrition Risk: Meets ASPEN criteria for severe chronic disease related malnutrition as evidenced by 15% weight loss in <3 months, severe muscle and severe fat losses, protruding clavicles, and depressed temple region.    Recommendations/Interventions/Plan:    Follow electrolytes  Resume PO diet when clinically feasible  Monitor weight.  Nutrition rep will continue to see patient for ongoing meal and snack preferences.     RD following    "

## 2023-02-24 NOTE — PROGRESS NOTES
ACS Staff    HD #2, right psoas abscess.    /64   Pulse 99   Temp 37.7 °C (99.9 °F) (Temporal)   Resp 20   SpO2 98%   Cachectic appearing, NAD  Abd soft, ND. Mildly TTP in RLQ.    WBC 16    CT showed multi loculated right psoas abscess.    A/P: Recommend IR drain. No plans for surgery.  ACS Blue service will sign off. Please call with questions.     Tariq Israel MD  [phone]

## 2023-02-24 NOTE — CARE PLAN
The patient is Watcher - Medium risk of patient condition declining or worsening    Shift Goals  Clinical Goals: pain control  Patient Goals: pain control    Progress made toward(s) clinical / shift goals:    Problem: Pain - Standard  Goal: Alleviation of pain or a reduction in pain to the patient’s comfort goal  Outcome: Progressing  Note: Pain tolerated with PRN medications       Patient is not progressing towards the following goals:  Problem: Knowledge Deficit - Standard  Goal: Patient and family/care givers will demonstrate understanding of plan of care, disease process/condition, diagnostic tests and medications  Outcome: Not Progressing  Note: Pt is oriented to self and place. She is intermittently oriented to time and situation. Overall is very anxious and does not understand plan of care

## 2023-02-24 NOTE — CONSULTS
CHIEF COMPLAINT: Psoas abscess    Consult requested by Dr. Cr from the hospitalist service.     HISTORY OF PRESENT ILLNESS: The patient is a 72 year-old White elderly woman who was transferred from [facility] for management of a large psoas abscess.  She has been having subacute/chronic right flank pain and urinary infections.  She originally thought to have a kidney mass which was biopsied on February 6.  Pathology report was more consistent with inflammatory tissue/abscess so she was sent to the emergency room for admission to the hospital.  Patient has intermittently had significant abdominal discomfort but is relatively comfortable at this time.  She has experienced weight loss and poor appetite over the last few months.  Intermittent fevers and chills are reported as well.    PAST MEDICAL HISTORY:  has a past medical history of Zheng's esophagus, Cataract, Depression, Fracture of humeral head, right, closed (2005), Fracture, femur closed, shaft (HCC) (8/12/2013), GERD (gastroesophageal reflux disease), H/O measles, History of chickenpox, IBD (inflammatory bowel disease), Kyphosis, OSTEOPOROSIS, and Wrist fracture, left (2004).    PAST SURGICAL HISTORY:  has a past surgical history that includes orif, fracture, femur (8/12/2013); orif, fracture, humerus, proximal (Right); and cataract extraction with iol (Bilateral, 3/2016).    ALLERGIES:   Allergies   Allergen Reactions    Seasonal     Wellbutrin [Bupropion Hcl]      Had a siezure       CURRENT MEDICATIONS:   Home Medications    **Home medications have not yet been reviewed for this encounter**       FAMILY HISTORY: family history includes Cancer in her father; Osteoporosis in her mother.    SOCIAL HISTORY:  reports that she quit smoking about 13 years ago. Her smoking use included cigarettes. She has a 17.50 pack-year smoking history. She has never used smokeless tobacco. She reports current alcohol use of about 8.4 oz per week. She reports that  she does not use drugs.    REVIEW OF SYSTEMS: Review of systems is remarkable for the following right flank and abdominal pain, fevers chills, weight loss and anorexia.. The remainder of the comprehensive ROS is negative with the exception of the aforementioned HPI, PMH, and PSH bullets in accordance with CMS guideline.    PHYSICAL EXAMINATION:      Vital Signs: /56   Pulse (!) 112   Temp 37.2 °C (99 °F) (Temporal)   Resp (!) 22   SpO2 93%   Physical Exam  Vitals and nursing note reviewed.   Constitutional:       Appearance: She is cachectic.   HENT:      Head: Normocephalic and atraumatic.      Nose: Nose normal.      Mouth/Throat:      Mouth: Mucous membranes are moist.      Pharynx: Oropharynx is clear.   Eyes:      Extraocular Movements: Extraocular movements intact.      Conjunctiva/sclera: Conjunctivae normal.   Neck:      Comments: Trachea midline  Cardiovascular:      Rate and Rhythm: Regular rhythm. Tachycardia present.      Pulses: Normal pulses.   Pulmonary:      Effort: Pulmonary effort is normal.      Breath sounds: No stridor. No wheezing.   Abdominal:      General: There is no distension.      Palpations: Abdomen is soft.      Comments: Mild tenderness over a large right-sided abdominal mass   Musculoskeletal:         General: Normal range of motion.      Cervical back: Normal range of motion.      Right lower leg: No edema.      Left lower leg: No edema.   Skin:     General: Skin is warm and dry.      Coloration: Skin is pale.   Neurological:      General: No focal deficit present.      Mental Status: She is alert. She is disoriented.       LABORATORY VALUES:   Recent Labs     02/23/23  1555 02/23/23 2259   WBC 18.8* 16.4*   RBC 3.71* 3.64*   HEMOGLOBIN 9.5* 9.4*   HEMATOCRIT 29.1* 28.0*   MCV 78.4* 76.9*   MCH 25.6* 25.8*   MCHC 32.6* 33.6   RDW 17.2* 48.9   PLATELETCT 378 374   MPV 10.5* 10.9     Recent Labs     02/23/23  1555 02/23/23  2259   SODIUM 127* 132*   POTASSIUM 4.1 3.4*    CHLORIDE 92* 98   CO2 23 20   GLUCOSE 118* 115*   BUN 22* 18   CREATININE 0.9 0.69   CALCIUM 11.0 10.1     Recent Labs     02/23/23  1555 02/23/23  2259   ASTSGOT 21 16   ALTSGPT 17 13   TBILIRUBIN 0.5 0.5   ALKPHOSPHAT 104 91   GLOBULIN  --  3.7*   INR  --  1.32*     Recent Labs     02/23/23  2259   INR 1.32*        IMAGING:   [id number] VIEW    (Results Pending)   EC-ECHOCARDIOGRAM COMPLETE W/O CONT    (Results Pending)   US-EXTREMITY VENOUS LOWER BILAT    (Results Pending)   IR-DRAIN-RETROPERITONEAL    (Results Pending)       ASSESSMENT AND PLAN:   72-year-old female with large right-sided psoas abscess that may be fairly chronic.  In any case it is associated with mild sepsis as well as multiple constitutional manifestations.    Recommend IR drainage of her psoas abscess.  It is relatively rare to require open drainage. N.p.o. and hold Lovenox until drainage has been done. She should have broad-spectrum antibiotics until cultures are back.     Sepsis management and resuscitation is being managed by the hospitalist.    Recommend dietary assessment and fairly aggressive nutritional supplementation.     Please contact the surgical service if there is any question or concern.    Psoas abscess (HCC)  Pain control  IV Zosyn started  Follow blood cultures  I did consult with surgery who recommended to have an IR drain in place    Sepsis (HCC)  This is Sepsis Present on admission  SIRS criteria identified on my evaluation include: Fever, with temperature greater than 101 deg F, Tachypnea, with respirations greater than 20 per minute and Leukocytosis, with WBC greater than 12,000  Source is right psoas abscess  Sepsis protocol initiated  Fluid resuscitation ordered per protocol  Crystalloid Fluid Administration: Fluid resuscitation ordered per standard protocol - 30 mL/kg per current or ideal body weight  IV antibiotics as appropriate for source of sepsis  Reassessment: I have reassessed the patient's hemodynamic  status          Acute encephalopathy  Secondary to infectious etiology    Leg swelling  Venous Dopplers and cardiac echo has been ordered         ____________________________________     Michael Arreaga D.O.    DD: 2/23/2023  11:44 PM

## 2023-02-25 ENCOUNTER — APPOINTMENT (OUTPATIENT)
Dept: RADIOLOGY | Facility: MEDICAL CENTER | Age: 73
DRG: 871 | End: 2023-02-25
Attending: STUDENT IN AN ORGANIZED HEALTH CARE EDUCATION/TRAINING PROGRAM
Payer: MEDICARE

## 2023-02-25 PROBLEM — R13.19 OTHER DYSPHAGIA: Status: ACTIVE | Noted: 2023-02-25

## 2023-02-25 LAB
ALBUMIN SERPL BCP-MCNC: 2.4 G/DL (ref 3.2–4.9)
ALBUMIN/GLOB SERPL: 0.7 G/DL
ALP SERPL-CCNC: 98 U/L (ref 30–99)
ALT SERPL-CCNC: 13 U/L (ref 2–50)
ANION GAP SERPL CALC-SCNC: 13 MMOL/L (ref 7–16)
AST SERPL-CCNC: 26 U/L (ref 12–45)
BASOPHILS # BLD AUTO: 0.4 % (ref 0–1.8)
BASOPHILS # BLD: 0.05 K/UL (ref 0–0.12)
BILIRUB SERPL-MCNC: 0.3 MG/DL (ref 0.1–1.5)
BUN SERPL-MCNC: 14 MG/DL (ref 8–22)
CALCIUM ALBUM COR SERPL-MCNC: 10.2 MG/DL (ref 8.5–10.5)
CALCIUM SERPL-MCNC: 8.9 MG/DL (ref 8.5–10.5)
CHLORIDE SERPL-SCNC: 107 MMOL/L (ref 96–112)
CO2 SERPL-SCNC: 18 MMOL/L (ref 20–33)
CREAT SERPL-MCNC: 0.88 MG/DL (ref 0.5–1.4)
EOSINOPHIL # BLD AUTO: 0.03 K/UL (ref 0–0.51)
EOSINOPHIL NFR BLD: 0.2 % (ref 0–6.9)
ERYTHROCYTE [DISTWIDTH] IN BLOOD BY AUTOMATED COUNT: 51.4 FL (ref 35.9–50)
FERRITIN SERPL-MCNC: 1040 NG/ML (ref 10–291)
GFR SERPLBLD CREATININE-BSD FMLA CKD-EPI: 70 ML/MIN/1.73 M 2
GLOBULIN SER CALC-MCNC: 3.5 G/DL (ref 1.9–3.5)
GLUCOSE BLD STRIP.AUTO-MCNC: 107 MG/DL (ref 65–99)
GLUCOSE SERPL-MCNC: 115 MG/DL (ref 65–99)
GRAM STN SPEC: NORMAL
HCT VFR BLD AUTO: 28.1 % (ref 37–47)
HGB BLD-MCNC: 9.1 G/DL (ref 12–16)
IMM GRANULOCYTES # BLD AUTO: 0.12 K/UL (ref 0–0.11)
IMM GRANULOCYTES NFR BLD AUTO: 0.9 % (ref 0–0.9)
LYMPHOCYTES # BLD AUTO: 1.08 K/UL (ref 1–4.8)
LYMPHOCYTES NFR BLD: 7.9 % (ref 22–41)
MAGNESIUM SERPL-MCNC: 1.9 MG/DL (ref 1.5–2.5)
MCH RBC QN AUTO: 25.9 PG (ref 27–33)
MCHC RBC AUTO-ENTMCNC: 32.4 G/DL (ref 33.6–35)
MCV RBC AUTO: 79.8 FL (ref 81.4–97.8)
MONOCYTES # BLD AUTO: 0.69 K/UL (ref 0–0.85)
MONOCYTES NFR BLD AUTO: 5 % (ref 0–13.4)
NEUTROPHILS # BLD AUTO: 11.76 K/UL (ref 2–7.15)
NEUTROPHILS NFR BLD: 85.6 % (ref 44–72)
NRBC # BLD AUTO: 0 K/UL
NRBC BLD-RTO: 0 /100 WBC
PHOSPHATE SERPL-MCNC: 3.4 MG/DL (ref 2.5–4.5)
PLATELET # BLD AUTO: 384 K/UL (ref 164–446)
PMV BLD AUTO: 10.5 FL (ref 9–12.9)
POTASSIUM SERPL-SCNC: 3.3 MMOL/L (ref 3.6–5.5)
PROT SERPL-MCNC: 5.9 G/DL (ref 6–8.2)
RBC # BLD AUTO: 3.52 M/UL (ref 4.2–5.4)
SIGNIFICANT IND 70042: NORMAL
SITE SITE: NORMAL
SODIUM SERPL-SCNC: 138 MMOL/L (ref 135–145)
SOURCE SOURCE: NORMAL
WBC # BLD AUTO: 13.7 K/UL (ref 4.8–10.8)

## 2023-02-25 PROCEDURE — 82962 GLUCOSE BLOOD TEST: CPT

## 2023-02-25 PROCEDURE — 700105 HCHG RX REV CODE 258: Performed by: STUDENT IN AN ORGANIZED HEALTH CARE EDUCATION/TRAINING PROGRAM

## 2023-02-25 PROCEDURE — 700101 HCHG RX REV CODE 250: Performed by: STUDENT IN AN ORGANIZED HEALTH CARE EDUCATION/TRAINING PROGRAM

## 2023-02-25 PROCEDURE — 83735 ASSAY OF MAGNESIUM: CPT

## 2023-02-25 PROCEDURE — 700111 HCHG RX REV CODE 636 W/ 250 OVERRIDE (IP): Performed by: HOSPITALIST

## 2023-02-25 PROCEDURE — 36415 COLL VENOUS BLD VENIPUNCTURE: CPT

## 2023-02-25 PROCEDURE — 85025 COMPLETE CBC W/AUTO DIFF WBC: CPT

## 2023-02-25 PROCEDURE — 99232 SBSQ HOSP IP/OBS MODERATE 35: CPT | Performed by: STUDENT IN AN ORGANIZED HEALTH CARE EDUCATION/TRAINING PROGRAM

## 2023-02-25 PROCEDURE — 700111 HCHG RX REV CODE 636 W/ 250 OVERRIDE (IP): Performed by: STUDENT IN AN ORGANIZED HEALTH CARE EDUCATION/TRAINING PROGRAM

## 2023-02-25 PROCEDURE — 80053 COMPREHEN METABOLIC PANEL: CPT

## 2023-02-25 PROCEDURE — 700105 HCHG RX REV CODE 258: Performed by: HOSPITALIST

## 2023-02-25 PROCEDURE — 92610 EVALUATE SWALLOWING FUNCTION: CPT

## 2023-02-25 PROCEDURE — 84100 ASSAY OF PHOSPHORUS: CPT

## 2023-02-25 PROCEDURE — 770004 HCHG ROOM/CARE - ONCOLOGY PRIVATE *

## 2023-02-25 RX ORDER — SODIUM CHLORIDE 9 MG/ML
1000 INJECTION, SOLUTION INTRAVENOUS ONCE
Status: COMPLETED | OUTPATIENT
Start: 2023-02-25 | End: 2023-02-25

## 2023-02-25 RX ORDER — SODIUM CHLORIDE AND POTASSIUM CHLORIDE 300; 900 MG/100ML; MG/100ML
1000 INJECTION, SOLUTION INTRAVENOUS ONCE
Status: COMPLETED | OUTPATIENT
Start: 2023-02-25 | End: 2023-02-25

## 2023-02-25 RX ORDER — SODIUM CHLORIDE, SODIUM LACTATE, POTASSIUM CHLORIDE, CALCIUM CHLORIDE 600; 310; 30; 20 MG/100ML; MG/100ML; MG/100ML; MG/100ML
INJECTION, SOLUTION INTRAVENOUS ONCE
Status: DISCONTINUED | OUTPATIENT
Start: 2023-02-25 | End: 2023-02-25

## 2023-02-25 RX ORDER — LORAZEPAM 2 MG/ML
0.5 INJECTION INTRAMUSCULAR EVERY 4 HOURS PRN
Status: DISCONTINUED | OUTPATIENT
Start: 2023-02-25 | End: 2023-03-01

## 2023-02-25 RX ORDER — LORAZEPAM 0.5 MG/1
0.5 TABLET ORAL EVERY 4 HOURS PRN
Status: DISCONTINUED | OUTPATIENT
Start: 2023-02-25 | End: 2023-03-01

## 2023-02-25 RX ADMIN — PIPERACILLIN AND TAZOBACTAM 4.5 G: 4; .5 INJECTION, POWDER, LYOPHILIZED, FOR SOLUTION INTRAVENOUS; PARENTERAL at 21:28

## 2023-02-25 RX ADMIN — MORPHINE SULFATE 2 MG: 4 INJECTION INTRAVENOUS at 08:48

## 2023-02-25 RX ADMIN — POTASSIUM CHLORIDE AND SODIUM CHLORIDE 1000 ML: 900; 300 INJECTION, SOLUTION INTRAVENOUS at 17:12

## 2023-02-25 RX ADMIN — LORAZEPAM 0.5 MG: 2 INJECTION INTRAMUSCULAR; INTRAVENOUS at 17:29

## 2023-02-25 RX ADMIN — PIPERACILLIN AND TAZOBACTAM 4.5 G: 4; .5 INJECTION, POWDER, LYOPHILIZED, FOR SOLUTION INTRAVENOUS; PARENTERAL at 06:14

## 2023-02-25 RX ADMIN — SODIUM CHLORIDE 1000 ML: 9 INJECTION, SOLUTION INTRAVENOUS at 08:41

## 2023-02-25 RX ADMIN — PIPERACILLIN AND TAZOBACTAM 4.5 G: 4; .5 INJECTION, POWDER, LYOPHILIZED, FOR SOLUTION INTRAVENOUS; PARENTERAL at 13:00

## 2023-02-25 RX ADMIN — MORPHINE SULFATE 2 MG: 4 INJECTION INTRAVENOUS at 17:30

## 2023-02-25 RX ADMIN — LORAZEPAM 0.5 MG: 2 INJECTION INTRAMUSCULAR; INTRAVENOUS at 21:24

## 2023-02-25 RX ADMIN — LORAZEPAM 1 MG: 2 INJECTION INTRAMUSCULAR; INTRAVENOUS at 10:53

## 2023-02-25 ASSESSMENT — LIFESTYLE VARIABLES
AGITATION: NORMAL ACTIVITY
ANXIETY: MILDLY ANXIOUS
VISUAL DISTURBANCES: NOT PRESENT
HEADACHE, FULLNESS IN HEAD: NOT PRESENT
ANXIETY: MILDLY ANXIOUS
AUDITORY DISTURBANCES: NOT PRESENT
ORIENTATION AND CLOUDING OF SENSORIUM: DATE DISORIENTATION BY MORE THAN TWO CALENDAR DAYS
TOTAL SCORE: 12
AGITATION: *
AUDITORY DISTURBANCES: NOT PRESENT
PAROXYSMAL SWEATS: NO SWEAT VISIBLE
HEADACHE, FULLNESS IN HEAD: NOT PRESENT
AUDITORY DISTURBANCES: NOT PRESENT
ORIENTATION AND CLOUDING OF SENSORIUM: DATE DISORIENTATION BY MORE THAN TWO CALENDAR DAYS
NAUSEA AND VOMITING: NO NAUSEA AND NO VOMITING
HEADACHE, FULLNESS IN HEAD: NOT PRESENT
TREMOR: *
AGITATION: SOMEWHAT MORE THAN NORMAL ACTIVITY
NAUSEA AND VOMITING: NO NAUSEA AND NO VOMITING
TREMOR: *
ANXIETY: NO ANXIETY (AT EASE)
VISUAL DISTURBANCES: NOT PRESENT
TREMOR: TREMOR NOT VISIBLE BUT CAN BE FELT, FINGERTIP TO FINGERTIP
TOTAL SCORE: 4
ORIENTATION AND CLOUDING OF SENSORIUM: DATE DISORIENTATION BY MORE THAN TWO CALENDAR DAYS
ORIENTATION AND CLOUDING OF SENSORIUM: ORIENTED AND CAN DO SERIAL ADDITIONS
ANXIETY: NO ANXIETY (AT EASE)
TOTAL SCORE: 5
PAROXYSMAL SWEATS: NO SWEAT VISIBLE
ANXIETY: NO ANXIETY (AT EASE)
NAUSEA AND VOMITING: NO NAUSEA AND NO VOMITING
TOTAL SCORE: 5
NAUSEA AND VOMITING: NO NAUSEA AND NO VOMITING
PAROXYSMAL SWEATS: NO SWEAT VISIBLE
VISUAL DISTURBANCES: NOT PRESENT
AGITATION: SOMEWHAT MORE THAN NORMAL ACTIVITY
AUDITORY DISTURBANCES: NOT PRESENT
AGITATION: NORMAL ACTIVITY
NAUSEA AND VOMITING: NO NAUSEA AND NO VOMITING
TOTAL SCORE: 3
HEADACHE, FULLNESS IN HEAD: NOT PRESENT
VISUAL DISTURBANCES: NOT PRESENT
HEADACHE, FULLNESS IN HEAD: NOT PRESENT
PAROXYSMAL SWEATS: NO SWEAT VISIBLE
AGITATION: NORMAL ACTIVITY
SUBSTANCE_ABUSE: 1
PAROXYSMAL SWEATS: NO SWEAT VISIBLE
ORIENTATION AND CLOUDING OF SENSORIUM: ORIENTED AND CAN DO SERIAL ADDITIONS
VISUAL DISTURBANCES: NOT PRESENT
NAUSEA AND VOMITING: NO NAUSEA AND NO VOMITING
ORIENTATION AND CLOUDING OF SENSORIUM: DATE DISORIENTATION BY MORE THAN TWO CALENDAR DAYS
TREMOR: TREMOR NOT VISIBLE BUT CAN BE FELT, FINGERTIP TO FINGERTIP
PAROXYSMAL SWEATS: NO SWEAT VISIBLE
ANXIETY: MODERATELY ANXIOUS OR GUARDED, SO ANXIETY IS INFERRED
HEADACHE, FULLNESS IN HEAD: NOT PRESENT
TOTAL SCORE: 4
VISUAL DISTURBANCES: NOT PRESENT
TREMOR: *
TREMOR: NO TREMOR

## 2023-02-25 ASSESSMENT — ENCOUNTER SYMPTOMS
NECK PAIN: 0
BLOOD IN STOOL: 0
SORE THROAT: 0
ABDOMINAL PAIN: 1
HEMOPTYSIS: 0
MYALGIAS: 0
VOMITING: 0
NAUSEA: 0
BRUISES/BLEEDS EASILY: 0
FLANK PAIN: 1
FEVER: 0
DEPRESSION: 1
HEADACHES: 0
EYE PAIN: 0
CONSTIPATION: 0
NERVOUS/ANXIOUS: 0
SHORTNESS OF BREATH: 0
CHILLS: 0
DIARRHEA: 0
BACK PAIN: 0
SINUS PAIN: 0

## 2023-02-25 ASSESSMENT — PAIN DESCRIPTION - PAIN TYPE
TYPE: ACUTE PAIN
TYPE: ACUTE PAIN

## 2023-02-25 ASSESSMENT — FIBROSIS 4 INDEX: FIB4 SCORE: 1.35

## 2023-02-25 NOTE — PROGRESS NOTES
Hospital Medicine Daily Progress Note    Date of Service  2/24/2023    Chief Complaint  Mena Campos is a 72 y.o. female with GERD, MDD, EtOH use DO, and Left Renal / RP mass followed by oncologist Dr. Campos admitted 2/23/2023 with Right psoas abscess    Hospital Course  No notes on file    Interval Problem Update    2/24: She is encephalopathic and tremulous. Initial CIWA 10. Discussed with her , who affirmed that she drinks multiple beers daily to cope with pain and most recently would have been Wednesday. He reports that she has been declining for the past few weeks and they are changing PCPs due to perceived ambivalence about her condition. She underwent Right psoas drain placement today with IR.    I have discussed this patient's plan of care and discharge plan at IDT rounds today with Case Management, Nursing, Nursing leadership, and other members of the IDT team.    Consultants/Specialty  general surgery and interventional radiology    Code Status  Full Code    Disposition  Patient is not medically cleared for discharge.   Anticipate discharge to to home with organized home healthcare and close outpatient follow-up.  I have placed the appropriate orders for post-discharge needs.    Review of Systems  Review of Systems   Unable to perform ROS: Mental acuity      Physical Exam  Temp:  [36.2 °C (97.2 °F)-37.7 °C (99.9 °F)] 37.2 °C (99 °F)  Pulse:  [] 94  Resp:  [12-22] 18  BP: ()/(56-85) 95/62  SpO2:  [91 %-99 %] 91 %    Physical Exam  Vitals and nursing note reviewed. Exam conducted with a chaperone present ( at bedside).   Constitutional:       General: She is in acute distress.      Appearance: She is ill-appearing (Chronically) and diaphoretic. She is not toxic-appearing.   HENT:      Head:      Comments: Bitemporal wasting     Nose: Nose normal.      Mouth/Throat:      Mouth: Mucous membranes are dry.      Comments: +Tongue fasciculations  Eyes:      General: No scleral  icterus.     Conjunctiva/sclera: Conjunctivae normal.   Cardiovascular:      Rate and Rhythm: Regular rhythm. Tachycardia present.      Pulses: Normal pulses.      Heart sounds: Normal heart sounds. No murmur heard.    No friction rub. No gallop.   Pulmonary:      Effort: Pulmonary effort is normal. No respiratory distress.      Breath sounds: Normal breath sounds. No wheezing, rhonchi or rales.   Abdominal:      General: Abdomen is flat. Bowel sounds are normal. There is no distension.      Palpations: Abdomen is soft.      Tenderness: There is no abdominal tenderness. There is no guarding or rebound.   Genitourinary:     Comments: No haq  Musculoskeletal:      Cervical back: Neck supple.      Right lower leg: No edema.      Left lower leg: No edema.   Skin:     General: Skin is warm.   Neurological:      Mental Status: She is alert.      Motor: Tremor present.   Psychiatric:         Attention and Perception: She is inattentive.         Mood and Affect: Mood is anxious.         Speech: Speech is delayed.         Behavior: Behavior is slowed. Behavior is cooperative.         Cognition and Memory: She exhibits impaired recent memory.       Fluids    Intake/Output Summary (Last 24 hours) at 2/24/2023 1943  Last data filed at 2/24/2023 0537  Gross per 24 hour   Intake 613 ml   Output --   Net 613 ml       Laboratory  Recent Labs     02/23/23  1555 02/23/23  2259   WBC 18.8* 16.4*   RBC 3.71* 3.64*   HEMOGLOBIN 9.5* 9.4*   HEMATOCRIT 29.1* 28.0*   MCV 78.4* 76.9*   MCH 25.6* 25.8*   MCHC 32.6* 33.6   RDW 17.2* 48.9   PLATELETCT 378 374   MPV 10.5* 10.9     Recent Labs     02/23/23  1555 02/23/23  2259   SODIUM 127* 132*   POTASSIUM 4.1 3.4*   CHLORIDE 92* 98   CO2 23 20   GLUCOSE 118* 115*   BUN 22* 18   CREATININE 0.9 0.69   CALCIUM 11.0 10.1     Recent Labs     02/23/23  2259   INR 1.32*               Imaging  CT-DRAIN-RETROPERITONEAL   Final Result      1.  CT guided placement of a percutaneous drainage catheter in  a right psoas fluid collection.   2.  The current plan is to obtain a follow-up CT scan in 5-7 days.      US-EXTREMITY VENOUS LOWER BILAT   Final Result      OUTSIDE IMAGES-CT ABDOMEN /PELVIS   Final Result      [id number] VIEW   Final Result      Loops of bowel and colon are somewhat indistinct, possibly related to technique. Appearance appears overall somewhat similar to outside facility CT abdomen pelvis from 2/23/2023. If symptoms have changed from recent CT, CT evaluation is recommended.           Assessment/Plan  * Psoas abscess (HCC)- (present on admission)  Assessment & Plan  Presented to [facility] [place] with Right flank pain  CT demonstrated psoas abscess prompting transfer to [facility] for IR  General surgery consulted and s/o, recommended IR-guided drainage  IR-guided drainage 2/24/23  On zosyn pending culture results  HIV negative    Retroperitoneal mass- (present on admission)  Assessment & Plan  Underwent biopsy of Left renal pole mass extending into RP, it was not indicative of malignancy  Follow up with Oncologist Dr. Campos for further diagnostic evaluation after discharge    Hypokalemia- (present on admission)  Assessment & Plan  Likely due to inadequate PO intake from EtOH use DO  Checking Mg, will replete if needed  Repeat AM BMP    Hyponatremia- (present on admission)  Assessment & Plan  Improving, Mild  Likely due to poor PO intake from EtOH use DO  Banana bag today  Repeat AM BMP    Microcytic anemia- (present on admission)  Assessment & Plan  Follows with Heme-Onc Dr. Campos  Prior Elevated ferritin and normal TBIC suggest AOCD rather than MASSIEL  Repeat ferritin / TIBC as unlikely to have hereditary hemoglobinopathy based on regular MCV previously  Transfuse for Hgb <7    Alcohol withdrawal syndrome, with delirium (HCC)- (present on admission)  Assessment & Plan  Reports >2 beers daily, most recent 2/22/23  Developed diaphoresis, delirium, tremor, tachycardia on HD#2  CIWA-Lorazepam protocol  Empiric MVN + B12  + Folate + Thiamine  Will discuss MAT / AA prior to discharge    Leg swelling- (present on admission)  Assessment & Plan  BLE US negative for DVT  TTE deferred    Acute encephalopathy- (present on admission)  Assessment & Plan  Likely multifactorial including underlying abscess and EtOH withdrawal - see separate plans  Nonfocal, CTH deferred    Sepsis with encephalopathy without septic shock (HCC)- (present on admission)  Assessment & Plan  This is Sepsis Present on admission  SIRS criteria identified on my evaluation include: Fever, with temperature greater than 101 deg F, Tachypnea, with respirations greater than 20 per minute and Leukocytosis, with WBC greater than 12,000  Source is right psoas abscess  Sepsis protocol initiated  Fluid resuscitation ordered per protocol  Crystalloid Fluid Administration: Fluid resuscitation ordered per standard protocol - 30 mL/kg per current or ideal body weight  IV antibiotics as appropriate for source of sepsis  Reassessment: I have reassessed the patient's hemodynamic status    BCx 2/23: NGTD  Abscess Cx 2/24: NGTD  Continue zosyn for intra-abdominal abscess      Gastroesophageal reflux disease with esophagitis- (present on admission)  Assessment & Plan  Likely exacerbated by EtOH use DO  Transitioned famotidine to PPI    OAB (overactive bladder)- (present on admission)  Assessment & Plan  Continue oxybutynin    Moderate episode of recurrent major depressive disorder (HCC)- (present on admission)  Assessment & Plan  Continue sertraline       VTE prophylaxis: SCDs/TEDs and pharmacologic prophylaxis contraindicated due to psoas abscess drain 2/24    I have performed a physical exam and reviewed and updated ROS and Plan today (2/24/2023). In review of yesterday's note (2/23/2023), there are no changes except as documented above.

## 2023-02-25 NOTE — PROGRESS NOTES
CT NOTE:  pt confused,   consented via phone, right psoas drain placed by Dr. Croft.  BS FLEXIMA APDL 12FR 25CM REF [id number] LOT [id number], pt tolerated well and vss on ra

## 2023-02-25 NOTE — PROGRESS NOTES
This RN messaged APRN Wegener. This RN just wanted to let APRN know that PO meds have been held at this time pending a speech eval. Per day shift RN pt sounds like she's aspirating when drinking. The pt received Ativan yesterday and has also been lethargic and drowsy since. This RN just wanted to let the APRN know in case APRN wanted any of the pt's PO meds changed to IV.

## 2023-02-25 NOTE — THERAPY
"Speech Language Pathology   Clinical Swallow Evaluation     Patient Name: Mena Campos  AGE:  72 y.o., SEX:  female  Medical Record #: [id number]  Today's Date: 2/25/2023     HPI: PER EMR-Mena Campos is a 72 y.o. female with GERD, MDD, EtOH use DO, and Left Renal / RP mass followed by oncologist Dr. Campos admitted 2/23/2023 with Right psoas abscess.     PMHx:  ETOH, Zheng's esophagus, GERD. See EMR for complete hist.     No prior SLP at [facility].   Level of Consciousness: Alert  Affect/Behavior: Confused  Follows Directives: Yes - simple commands only  Orientation: Self  Hearing: Functional hearing      Oral Mechanism Evaluation  Facial Symmetry: WFL  Labial Observations: WFL  Lingual Observations: WFL  Dentition: Fair  Comments:  Nurse informed of erythemia with whitish patches to soft palate and posterior throat    Voice  Quality: WFL  Intensity: Appropriate  Cough: WFL  Comments:      Current Method of Nutrition   NPO until cleared by speech pathology      Subjective  \"I need water.\"       Assessment  Positioning: Judd's (60-90 degrees)  Bolus Administration: SLP  Factor(s) Affecting Performance: Impaired mental status-CIWA per RN    Swallowing Trials  Ice: WFL  Thin Liquid (TN0): Impaired  Pureed (PU4): WFL    Comments:  Pt with bilat UE tremor, confused speech, and with emotional lability. Pt required cues to open and close her oral cavity when presented with boluses from the spoon or cup. Intermittent non-productive coughing post sips of TNO from spoon and cup. Nurs reports pt with swallow difficulty x2 with attempted feeding on previous day. Pt requiring meds for CIWA with varying wakefulness yesterday.        Clinical Impressions  S/s of dysphagia are coughing post swallow of thins. Possible posterior oral cavity and pharyngeal irritation with erythema and white patches viewed at soft palate and posterior throat, nurs advised. Pt also with tremor, confusion, and on CIWA protocol. MBS " "order requested with SLP to communicate with RN at approx 12:30 to determine if pt is at the level to participate in a diagnostic swallow eval. NPO for meds and nutrition currently. Ice with staff as tolerated and when awake.        Recommendations  1.  NPO for meds and nutrition. Ice with staff as tolerated.   2.  Instrumentation: MBSS  3.  Swallowing Instructions & Precautions:   Supervision: 1:1 feeding with constant supervision for ice  Positioning: Fully upright and midline during oral intake  Medication: Non Oral   Oral care every 4 hours.      02/25/23 1052   Patient / Family Goals   Patient / Family Goal #1 \"I need water.\"   Goal #1 Outcome Goal not met   Short Term Goals   Short Term Goal # 1 Pt will follow directives during MBS with moderate cues.   Goal Outcome # 1 Goal not met          "

## 2023-02-25 NOTE — ASSESSMENT & PLAN NOTE
Underwent biopsy of Left renal pole mass extending into RP, it was not indicative of malignancy  Follow up with Oncologist Dr. Campos for further diagnostic evaluation after discharge

## 2023-02-25 NOTE — CARE PLAN
The patient is Watcher - Medium risk of patient condition declining or worsening    Shift Goals  Clinical Goals: Pt will remain free from injury  Patient Goals: Pt will be able to eat and drink.    Progress made toward(s) clinical / shift goals:  Telesitter in use. BA on and sounding. CIWA protocol in place for agitation and tremors.         Patient is not progressing towards the following goals: Pt unable to be initiated on a diet. Frankly aspirating. Unable to do modified barium swallow due to sedation from ativan and CIWA protocol. NPO at this time. IV fluids in use.       Problem: Knowledge Deficit - Standard  Goal: Patient and family/care givers will demonstrate understanding of plan of care, disease process/condition, diagnostic tests and medications  Outcome: Not Progressing

## 2023-02-25 NOTE — OR SURGEON
Immediate Post- Operative Note        Findings: R psoas abscess.       Procedure(s): CT guided drain placement.       Estimated Blood Loss: Less than 5 ml        Complications: None            2/24/2023     1712 PM     Edvin Croft M.D.

## 2023-02-25 NOTE — PROGRESS NOTES
Hospital Medicine Daily Progress Note    Date of Service  2/25/2023    Chief Complaint  Mena Campos is a 72 y.o. female with GERD, MDD, EtOH use DO, and Left Renal / RP mass followed by oncologist Dr. Campos admitted 2/23/2023 with Right psoas abscess    Hospital Course  No notes on file    Interval Problem Update    2/24: She is encephalopathic and tremulous. Initial CIWA 10. Discussed with her , who affirmed that she drinks multiple beers daily to cope with pain and most recently would have been Wednesday. He reports that she has been declining for the past few weeks and they are changing PCPs due to perceived ambivalence about her condition. She underwent Right psoas drain placement today with IR.    2/25:  RADHA drain placed with >130 cc of purulent output. CIWA 10-4-4-4-12 receiving lorazepam x2. She is appropriately conversant today. She is visibly aspirating with thin liquids for which SLP saw her and recommended barium swallow. She reports drinking EtOH for pain and that it wasn't much alcohol she drank on Wednesday. She has been depressed because of pain. Her pain is in her sides and abdomen. She denies F/C, N/V, bowel/bladder dysfunction, bleeding.    I have discussed this patient's plan of care and discharge plan at IDT rounds today with Case Management, Nursing, Nursing leadership, and other members of the IDT team.    Consultants/Specialty  general surgery and interventional radiology    Code Status  Full Code    Disposition  Patient is not medically cleared for discharge.   Anticipate discharge to to home with organized home healthcare and close outpatient follow-up.  I have placed the appropriate orders for post-discharge needs.    Review of Systems  Review of Systems   Constitutional:  Positive for malaise/fatigue. Negative for chills and fever.   HENT:  Negative for ear pain, nosebleeds, sinus pain and sore throat.    Eyes:  Negative for pain.   Respiratory:  Negative for hemoptysis  and shortness of breath.    Cardiovascular:  Negative for chest pain and leg swelling.   Gastrointestinal:  Positive for abdominal pain. Negative for blood in stool, constipation, diarrhea, melena, nausea and vomiting.   Genitourinary:  Positive for flank pain. Negative for dysuria and hematuria.   Musculoskeletal:  Negative for back pain, joint pain, myalgias and neck pain.   Neurological:  Negative for headaches.   Endo/Heme/Allergies:  Does not bruise/bleed easily.   Psychiatric/Behavioral:  Positive for depression and substance abuse. The patient is not nervous/anxious.       Physical Exam  Temp:  [36.4 °C (97.5 °F)-37.2 °C (99 °F)] 36.9 °C (98.4 °F)  Pulse:  [] 84  Resp:  [12-18] 17  BP: ()/(62-85) 115/77  SpO2:  [91 %-99 %] 97 %    Physical Exam  Vitals and nursing note reviewed. Exam conducted with a chaperone present ( at bedside).   Constitutional:       General: She is in acute distress.      Appearance: She is ill-appearing (Chronically) and diaphoretic. She is not toxic-appearing.   HENT:      Head:      Comments: Bitemporal wasting     Nose: Nose normal.      Mouth/Throat:      Mouth: Mucous membranes are dry.      Comments: +Tongue fasciculations  Eyes:      General: No scleral icterus.     Conjunctiva/sclera: Conjunctivae normal.   Cardiovascular:      Rate and Rhythm: Regular rhythm. Tachycardia present.      Pulses: Normal pulses.      Heart sounds: Normal heart sounds. No murmur heard.    No friction rub. No gallop.   Pulmonary:      Effort: Pulmonary effort is normal. No respiratory distress.      Breath sounds: Normal breath sounds. No wheezing, rhonchi or rales.   Abdominal:      General: Abdomen is flat. Bowel sounds are normal. There is no distension.      Palpations: Abdomen is soft.      Tenderness: There is no abdominal tenderness. There is no guarding or rebound.   Genitourinary:     Comments: No haq  Musculoskeletal:      Cervical back: Neck supple.      Right lower  leg: No edema.      Left lower leg: No edema.   Skin:     General: Skin is warm.   Neurological:      Mental Status: She is alert.      Motor: Tremor present.   Psychiatric:         Attention and Perception: She is inattentive.         Mood and Affect: Mood is anxious.         Speech: Speech is delayed.         Behavior: Behavior is slowed. Behavior is cooperative.         Cognition and Memory: She exhibits impaired recent memory.       Fluids    Intake/Output Summary (Last 24 hours) at 2/25/2023 1521  Last data filed at 2/25/2023 0848  Gross per 24 hour   Intake 0 ml   Output 130 ml   Net -130 ml         Laboratory  Recent Labs     02/23/23  1555 02/23/23 2259 02/25/23  0933   WBC 18.8* 16.4* 13.7*   RBC 3.71* 3.64* 3.52*   HEMOGLOBIN 9.5* 9.4* 9.1*   HEMATOCRIT 29.1* 28.0* 28.1*   MCV 78.4* 76.9* 79.8*   MCH 25.6* 25.8* 25.9*   MCHC 32.6* 33.6 32.4*   RDW 17.2* 48.9 51.4*   PLATELETCT 378 374 384   MPV 10.5* 10.9 10.5       Recent Labs     02/23/23  1555 02/23/23 2259 02/25/23  0933   SODIUM 127* 132* 138   POTASSIUM 4.1 3.4* 3.3*   CHLORIDE 92* 98 107   CO2 23 20 18*   GLUCOSE 118* 115* 115*   BUN 22* 18 14   CREATININE 0.9 0.69 0.88   CALCIUM 11.0 10.1 8.9       Recent Labs     02/23/23 2259   INR 1.32*                 Imaging  CT-DRAIN-RETROPERITONEAL   Final Result      1.  CT guided placement of a percutaneous drainage catheter in a right psoas fluid collection.   2.  The current plan is to obtain a follow-up CT scan in 5-7 days.      US-EXTREMITY VENOUS LOWER BILAT   Final Result      OUTSIDE IMAGES-CT ABDOMEN /PELVIS   Final Result      [id number] VIEW   Final Result      Loops of bowel and colon are somewhat indistinct, possibly related to technique. Appearance appears overall somewhat similar to outside facility CT abdomen pelvis from 2/23/2023. If symptoms have changed from recent CT, CT evaluation is recommended.      DX-ESOPHAGUS - [id number]    (Results Pending)          Assessment/Plan  *  Psoas abscess (HCC)- (present on admission)  Assessment & Plan  [place] [facility] [place]  CT demonstrated psoas abscess prompting transfer to [facility] for IR  General surgery consulted and s/o, recommended IR-guided drainage  IR-guided drainage 2/24/23  Continue zosyn pending culture results  HIV negative    Other dysphagia- (present on admission)  Assessment & Plan  SLP evaluation  Modified barium swallow ordered    Retroperitoneal mass- (present on admission)  Assessment & Plan  Underwent biopsy of Left renal pole mass extending into RP, it was not indicative of malignancy  Follow up with Oncologist Dr. Campos for further diagnostic evaluation after discharge    Hypokalemia- (present on admission)  Assessment & Plan  Likely due to inadequate PO intake from EtOH use DO  Mg WNL  NPO due to dysphagia - 1 L NS + 40 KCl  Repeat AM BMP    Hyponatremia- (present on admission)  Assessment & Plan  Resolved with IVF  Likely due to poor PO intake from EtOH use DO  NS + 40 KCl today due to NPO  Repeat AM BMP    Microcytic anemia- (present on admission)  Assessment & Plan  Follows with Heme-Onc Dr. Campos  Elevated ferritin indicative of AOCD  Transfuse for Hgb <7    Alcohol withdrawal syndrome, with delirium (HCC)- (present on admission)  Assessment & Plan  Reports >2 beers daily, most recent 2/22/23  Developed diaphoresis, delirium, tremor, tachycardia on HD#2  CIWA-Lorazepam protocol  Empiric MVN + B12 + Folate + Thiamine  Will discuss MAT / AA prior to discharge    Leg swelling- (present on admission)  Assessment & Plan  BLE US negative for DVT  TTE deferred    Acute encephalopathy- (present on admission)  Assessment & Plan  Improving  Likely multifactorial including underlying abscess and EtOH withdrawal - see separate plans  Nonfocal, CTH deferred    Sepsis with encephalopathy without septic shock (HCC)- (present on admission)  Assessment & Plan  This is Sepsis Present on admission  SIRS criteria identified on  my evaluation include: Fever, with temperature greater than 101 deg F, Tachypnea, with respirations greater than 20 per minute and Leukocytosis, with WBC greater than 12,000  Source is right psoas abscess  Sepsis protocol initiated  Fluid resuscitation ordered per protocol  Crystalloid Fluid Administration: Fluid resuscitation ordered per standard protocol - 30 mL/kg per current or ideal body weight  IV antibiotics as appropriate for source of sepsis  Reassessment: I have reassessed the patient's hemodynamic status    BCx 2/23: NGTD  Abscess Cx 2/24: NGTD  Continue zosyn for intra-abdominal abscess      Gastroesophageal reflux disease with esophagitis- (present on admission)  Assessment & Plan  Likely exacerbated by EtOH use DO  Continue omeprazole    OAB (overactive bladder)- (present on admission)  Assessment & Plan  Continue oxybutynin    Moderate episode of recurrent major depressive disorder (HCC)- (present on admission)  Assessment & Plan  Continue sertraline       VTE prophylaxis: SCDs/TEDs and pharmacologic prophylaxis contraindicated due to psoas abscess drain 2/24    I have performed a physical exam and reviewed and updated ROS and Plan today (2/25/2023). In review of yesterday's note (2/24/2023), there are no changes except as documented above.

## 2023-02-25 NOTE — CARE PLAN
Problem: Knowledge Deficit - Standard  Goal: Patient and family/care givers will demonstrate understanding of plan of care, disease process/condition, diagnostic tests and medications  Outcome: Progressing     Problem: Hemodynamics  Goal: Patient's hemodynamics, fluid balance and neurologic status will be stable or improve  Outcome: Progressing     Problem: Fluid Volume  Goal: Fluid volume balance will be maintained  Outcome: Progressing     Problem: Urinary - Renal Perfusion  Goal: Ability to achieve and maintain adequate renal perfusion and functioning will improve  Outcome: Progressing     Problem: Respiratory  Goal: Patient will achieve/maintain optimum respiratory ventilation and gas exchange  Outcome: Progressing     Problem: Physical Regulation  Goal: Diagnostic test results will improve  Outcome: Progressing  Goal: Signs and symptoms of infection will decrease  Outcome: Progressing     Problem: Pain - Standard  Goal: Alleviation of pain or a reduction in pain to the patient’s comfort goal  Outcome: Progressing     Problem: Fall Risk  Goal: Patient will remain free from falls  Outcome: Progressing     Problem: Skin Integrity  Goal: Skin integrity is maintained or improved  Outcome: Progressing     Problem: Optimal Care for Alcohol Withdrawal  Goal: Optimal Care for the alcohol withdrawal patient  Outcome: Progressing     Problem: Seizure Precautions  Goal: Implementation of seizure precautions  Outcome: Progressing     Problem: Lifestyle Changes  Goal: Patient's ability to identify lifestyle changes and available resources to help reduce recurrence of condition will improve  Outcome: Progressing     Problem: Psychosocial  Goal: Patient's level of anxiety will decrease  Outcome: Progressing  Goal: Spiritual and cultural needs incorporated into hospitalization  Outcome: Progressing     Problem: Risk for Aspiration  Goal: Patient's risk for aspiration will be absent or decrease  Outcome: Progressing   The  patient is Stable - Low risk of patient condition declining or worsening    Shift Goals  Clinical Goals: pain control  Patient Goals: pain control

## 2023-02-26 PROBLEM — K64.4 EXTERNAL HEMORRHOIDS: Status: ACTIVE | Noted: 2023-02-26

## 2023-02-26 LAB
ANION GAP SERPL CALC-SCNC: 13 MMOL/L (ref 7–16)
BACTERIA WND AEROBE CULT: ABNORMAL
BACTERIA WND AEROBE CULT: ABNORMAL
BASOPHILS # BLD AUTO: 0.7 % (ref 0–1.8)
BASOPHILS # BLD: 0.09 K/UL (ref 0–0.12)
BUN SERPL-MCNC: 15 MG/DL (ref 8–22)
CALCIUM SERPL-MCNC: 9 MG/DL (ref 8.5–10.5)
CHLORIDE SERPL-SCNC: 111 MMOL/L (ref 96–112)
CO2 SERPL-SCNC: 20 MMOL/L (ref 20–33)
CREAT SERPL-MCNC: 0.86 MG/DL (ref 0.5–1.4)
EOSINOPHIL # BLD AUTO: 0.13 K/UL (ref 0–0.51)
EOSINOPHIL NFR BLD: 1.1 % (ref 0–6.9)
ERYTHROCYTE [DISTWIDTH] IN BLOOD BY AUTOMATED COUNT: 52.4 FL (ref 35.9–50)
GFR SERPLBLD CREATININE-BSD FMLA CKD-EPI: 72 ML/MIN/1.73 M 2
GLUCOSE SERPL-MCNC: 96 MG/DL (ref 65–99)
GRAM STN SPEC: ABNORMAL
HCT VFR BLD AUTO: 32.3 % (ref 37–47)
HGB BLD-MCNC: 10.2 G/DL (ref 12–16)
IMM GRANULOCYTES # BLD AUTO: 0.12 K/UL (ref 0–0.11)
IMM GRANULOCYTES NFR BLD AUTO: 1 % (ref 0–0.9)
LYMPHOCYTES # BLD AUTO: 1.66 K/UL (ref 1–4.8)
LYMPHOCYTES NFR BLD: 13.7 % (ref 22–41)
MAGNESIUM SERPL-MCNC: 2 MG/DL (ref 1.5–2.5)
MCH RBC QN AUTO: 25.4 PG (ref 27–33)
MCHC RBC AUTO-ENTMCNC: 31.6 G/DL (ref 33.6–35)
MCV RBC AUTO: 80.3 FL (ref 81.4–97.8)
MONOCYTES # BLD AUTO: 0.64 K/UL (ref 0–0.85)
MONOCYTES NFR BLD AUTO: 5.3 % (ref 0–13.4)
NEUTROPHILS # BLD AUTO: 9.52 K/UL (ref 2–7.15)
NEUTROPHILS NFR BLD: 78.2 % (ref 44–72)
NRBC # BLD AUTO: 0 K/UL
NRBC BLD-RTO: 0 /100 WBC
PHOSPHATE SERPL-MCNC: 3.4 MG/DL (ref 2.5–4.5)
PLATELET # BLD AUTO: 383 K/UL (ref 164–446)
PMV BLD AUTO: 10.8 FL (ref 9–12.9)
POTASSIUM SERPL-SCNC: 3.2 MMOL/L (ref 3.6–5.5)
RBC # BLD AUTO: 4.02 M/UL (ref 4.2–5.4)
SIGNIFICANT IND 70042: ABNORMAL
SITE SITE: ABNORMAL
SODIUM SERPL-SCNC: 144 MMOL/L (ref 135–145)
SOURCE SOURCE: ABNORMAL
WBC # BLD AUTO: 12.2 K/UL (ref 4.8–10.8)

## 2023-02-26 PROCEDURE — 84100 ASSAY OF PHOSPHORUS: CPT

## 2023-02-26 PROCEDURE — 83735 ASSAY OF MAGNESIUM: CPT

## 2023-02-26 PROCEDURE — 700105 HCHG RX REV CODE 258: Performed by: HOSPITALIST

## 2023-02-26 PROCEDURE — 99232 SBSQ HOSP IP/OBS MODERATE 35: CPT | Performed by: STUDENT IN AN ORGANIZED HEALTH CARE EDUCATION/TRAINING PROGRAM

## 2023-02-26 PROCEDURE — A9270 NON-COVERED ITEM OR SERVICE: HCPCS | Performed by: STUDENT IN AN ORGANIZED HEALTH CARE EDUCATION/TRAINING PROGRAM

## 2023-02-26 PROCEDURE — 85025 COMPLETE CBC W/AUTO DIFF WBC: CPT

## 2023-02-26 PROCEDURE — 770004 HCHG ROOM/CARE - ONCOLOGY PRIVATE *

## 2023-02-26 PROCEDURE — 700102 HCHG RX REV CODE 250 W/ 637 OVERRIDE(OP): Performed by: HOSPITALIST

## 2023-02-26 PROCEDURE — 700102 HCHG RX REV CODE 250 W/ 637 OVERRIDE(OP): Performed by: STUDENT IN AN ORGANIZED HEALTH CARE EDUCATION/TRAINING PROGRAM

## 2023-02-26 PROCEDURE — 80048 BASIC METABOLIC PNL TOTAL CA: CPT

## 2023-02-26 PROCEDURE — 92526 ORAL FUNCTION THERAPY: CPT

## 2023-02-26 PROCEDURE — 700111 HCHG RX REV CODE 636 W/ 250 OVERRIDE (IP): Performed by: HOSPITALIST

## 2023-02-26 PROCEDURE — A9270 NON-COVERED ITEM OR SERVICE: HCPCS | Performed by: HOSPITALIST

## 2023-02-26 PROCEDURE — 36415 COLL VENOUS BLD VENIPUNCTURE: CPT

## 2023-02-26 RX ORDER — SENNOSIDES A AND B 8.6 MG/1
17.2 TABLET, FILM COATED ORAL
Status: DISCONTINUED | OUTPATIENT
Start: 2023-02-26 | End: 2023-02-28

## 2023-02-26 RX ORDER — POTASSIUM CHLORIDE 20 MEQ/1
40 TABLET, EXTENDED RELEASE ORAL DAILY
Status: DISCONTINUED | OUTPATIENT
Start: 2023-02-26 | End: 2023-03-01

## 2023-02-26 RX ADMIN — OXYCODONE 5 MG: 5 TABLET ORAL at 21:07

## 2023-02-26 RX ADMIN — POTASSIUM CHLORIDE 40 MEQ: 1500 TABLET, EXTENDED RELEASE ORAL at 15:32

## 2023-02-26 RX ADMIN — SENNOSIDES 17.2 MG: 8.6 TABLET, FILM COATED ORAL at 17:14

## 2023-02-26 RX ADMIN — POLYETHYLENE GLYCOL 3350 1 PACKET: 17 POWDER, FOR SOLUTION ORAL at 17:14

## 2023-02-26 RX ADMIN — SERTRALINE 100 MG: 100 TABLET, FILM COATED ORAL at 17:14

## 2023-02-26 RX ADMIN — PIPERACILLIN AND TAZOBACTAM 4.5 G: 4; .5 INJECTION, POWDER, LYOPHILIZED, FOR SOLUTION INTRAVENOUS; PARENTERAL at 05:23

## 2023-02-26 RX ADMIN — QUETIAPINE FUMARATE 50 MG: 25 TABLET ORAL at 21:02

## 2023-02-26 RX ADMIN — Medication 5 MG: at 21:02

## 2023-02-26 RX ADMIN — PIPERACILLIN AND TAZOBACTAM 4.5 G: 4; .5 INJECTION, POWDER, LYOPHILIZED, FOR SOLUTION INTRAVENOUS; PARENTERAL at 13:44

## 2023-02-26 RX ADMIN — PIPERACILLIN AND TAZOBACTAM 4.5 G: 4; .5 INJECTION, POWDER, LYOPHILIZED, FOR SOLUTION INTRAVENOUS; PARENTERAL at 21:04

## 2023-02-26 ASSESSMENT — LIFESTYLE VARIABLES
VISUAL DISTURBANCES: NOT PRESENT
ANXIETY: MILDLY ANXIOUS
NAUSEA AND VOMITING: NO NAUSEA AND NO VOMITING
ANXIETY: NO ANXIETY (AT EASE)
HEADACHE, FULLNESS IN HEAD: NOT PRESENT
AGITATION: NORMAL ACTIVITY
PAROXYSMAL SWEATS: NO SWEAT VISIBLE
TOTAL SCORE: 1
ORIENTATION AND CLOUDING OF SENSORIUM: ORIENTED AND CAN DO SERIAL ADDITIONS
AUDITORY DISTURBANCES: NOT PRESENT
AUDITORY DISTURBANCES: NOT PRESENT
TOTAL SCORE: 4
AGITATION: NORMAL ACTIVITY
NAUSEA AND VOMITING: NO NAUSEA AND NO VOMITING
VISUAL DISTURBANCES: NOT PRESENT
TOTAL SCORE: 0
NAUSEA AND VOMITING: NO NAUSEA AND NO VOMITING
HEADACHE, FULLNESS IN HEAD: NOT PRESENT
TREMOR: NO TREMOR
HEADACHE, FULLNESS IN HEAD: NOT PRESENT
TREMOR: *
VISUAL DISTURBANCES: NOT PRESENT
HEADACHE, FULLNESS IN HEAD: NOT PRESENT
PAROXYSMAL SWEATS: NO SWEAT VISIBLE
PAROXYSMAL SWEATS: NO SWEAT VISIBLE
TREMOR: *
AUDITORY DISTURBANCES: NOT PRESENT
TOTAL SCORE: 3
AUDITORY DISTURBANCES: NOT PRESENT
AGITATION: NORMAL ACTIVITY
VISUAL DISTURBANCES: NOT PRESENT
ANXIETY: MILDLY ANXIOUS
AGITATION: NORMAL ACTIVITY
NAUSEA AND VOMITING: NO NAUSEA AND NO VOMITING
PAROXYSMAL SWEATS: NO SWEAT VISIBLE
ORIENTATION AND CLOUDING OF SENSORIUM: ORIENTED AND CAN DO SERIAL ADDITIONS
ORIENTATION AND CLOUDING OF SENSORIUM: ORIENTED AND CAN DO SERIAL ADDITIONS
ANXIETY: NO ANXIETY (AT EASE)
TREMOR: TREMOR NOT VISIBLE BUT CAN BE FELT, FINGERTIP TO FINGERTIP
ORIENTATION AND CLOUDING OF SENSORIUM: ORIENTED AND CAN DO SERIAL ADDITIONS

## 2023-02-26 ASSESSMENT — ENCOUNTER SYMPTOMS
SHORTNESS OF BREATH: 0
CHILLS: 0
DIARRHEA: 0
FLANK PAIN: 1
BACK PAIN: 0
HEADACHES: 0
NAUSEA: 0
HEMOPTYSIS: 0
CONSTIPATION: 1
FEVER: 0
SINUS PAIN: 0
NERVOUS/ANXIOUS: 0
SORE THROAT: 0
BRUISES/BLEEDS EASILY: 0
NECK PAIN: 0
ABDOMINAL PAIN: 1
EYE PAIN: 0
VOMITING: 0
MYALGIAS: 0
BLOOD IN STOOL: 0
DEPRESSION: 1

## 2023-02-26 ASSESSMENT — PAIN DESCRIPTION - PAIN TYPE
TYPE: ACUTE PAIN
TYPE: ACUTE PAIN

## 2023-02-26 NOTE — PROGRESS NOTES
[facility] Medicine Daily Progress Note    Date of Service  2/26/2023    Chief Complaint  Mena Campos is a 72 y.o. female with GERD, MDD, EtOH use DO, and Left Renal / RP mass followed by oncologist Dr. Campos admitted 2/23/2023 with Right psoas abscess    Hospital Course  No notes on file    Interval Problem Update    2/24: She is encephalopathic and tremulous. Initial CIWA 10. Discussed with her , who affirmed that she drinks multiple beers daily to cope with pain and most recently would have been Wednesday. He reports that she has been declining for the past few weeks and they are changing PCPs due to perceived ambivalence about her condition. She underwent Right psoas drain placement today with IR.    2/25:  RADHA drain placed with >130 cc of purulent output. CIWA 10-4-4-4-12 receiving lorazepam x2. She is appropriately conversant today. She is visibly aspirating with thin liquids for which SLP saw her and recommended barium swallow. She reports drinking EtOH for pain and that it wasn't much alcohol she drank on Wednesday. She has been depressed because of pain. Her pain is in her sides and abdomen. She denies F/C, N/V, bowel/bladder dysfunction, bleeding.    2/26: RADHA drain ~40 cc of purulent output. CIWA 3-5-5-4-3. She is very thirsty and wants to eat/drink. Re-evaluated by SLP and cleared for regular/thins. She reports slight flank pain at the Right psoas abscess. She is constipated and felt a lump from her bottom while straining to have a BM. Denies F/C, N/V, bladder dysfunction. Abscess culture growing Citrobacter koseri with pan-susceptibility profile. Will consult ID tomorrow as culture matures for OPAT planning.    I have discussed this patient's plan of care and discharge plan at IDT rounds today with Case Management, Nursing, Nursing leadership, and other members of the IDT team.    Consultants/Specialty  general surgery and interventional radiology    Code Status  Full  Code    Disposition  Patient is not medically cleared for discharge.   Anticipate discharge to to home with organized home healthcare and close outpatient follow-up.  I have placed the appropriate orders for post-discharge needs.    Review of Systems  Review of Systems   Constitutional:  Positive for malaise/fatigue. Negative for chills and fever.   HENT:  Negative for ear pain, nosebleeds, sinus pain and sore throat.    Eyes:  Negative for pain.   Respiratory:  Negative for hemoptysis and shortness of breath.    Cardiovascular:  Negative for chest pain and leg swelling.   Gastrointestinal:  Positive for abdominal pain and constipation. Negative for blood in stool, diarrhea, melena, nausea and vomiting.   Genitourinary:  Positive for flank pain. Negative for dysuria and hematuria.   Musculoskeletal:  Negative for back pain, joint pain, myalgias and neck pain.   Neurological:  Negative for headaches.   Endo/Heme/Allergies:  Does not bruise/bleed easily.   Psychiatric/Behavioral:  Positive for depression. The patient is not nervous/anxious.       Physical Exam  Temp:  [36.2 °C (97.1 °F)-36.7 °C (98.1 °F)] 36.3 °C (97.4 °F)  Pulse:  [] 87  Resp:  [16-17] 16  BP: (102-130)/(68-86) 130/79  SpO2:  [97 %-99 %] 99 %    Physical Exam  Vitals and nursing note reviewed. Exam conducted with a chaperone present ( at bedside).   Constitutional:       General: She is not in acute distress.     Appearance: She is ill-appearing (Chronically). She is not toxic-appearing or diaphoretic.   HENT:      Head:      Comments: Bitemporal wasting     Nose: Nose normal.      Mouth/Throat:      Mouth: Mucous membranes are dry.   Eyes:      General: No scleral icterus.     Conjunctiva/sclera: Conjunctivae normal.   Cardiovascular:      Rate and Rhythm: Normal rate and regular rhythm.      Pulses: Normal pulses.      Heart sounds: Normal heart sounds. No murmur heard.    No friction rub. No gallop.   Pulmonary:      Effort: Pulmonary  effort is normal. No respiratory distress.      Breath sounds: Normal breath sounds. No wheezing, rhonchi or rales.   Abdominal:      General: Abdomen is flat. Bowel sounds are normal. There is no distension.      Palpations: Abdomen is soft.      Tenderness: There is no abdominal tenderness. There is right CVA tenderness (Mild at psoas drain site). There is no guarding or rebound.      Comments: Right RADHA drain output purulent   Genitourinary:     Comments: No haq  Musculoskeletal:      Cervical back: Neck supple.      Right lower leg: No edema.      Left lower leg: No edema.   Skin:     General: Skin is warm.   Neurological:      Mental Status: She is alert.      Motor: Tremor present.   Psychiatric:         Attention and Perception: Attention and perception normal.         Mood and Affect: Mood is anxious and depressed.         Speech: Speech normal.         Behavior: Behavior is agitated. Behavior is cooperative.       Fluids    Intake/Output Summary (Last 24 hours) at 2/26/2023 1304  Last data filed at 2/26/2023 0400  Gross per 24 hour   Intake --   Output 1440 ml   Net -1440 ml         Laboratory  Recent Labs     02/23/23 2259 02/25/23  0933 02/26/23  0413   WBC 16.4* 13.7* 12.2*   RBC 3.64* 3.52* 4.02*   HEMOGLOBIN 9.4* 9.1* 10.2*   HEMATOCRIT 28.0* 28.1* 32.3*   MCV 76.9* 79.8* 80.3*   MCH 25.8* 25.9* 25.4*   MCHC 33.6 32.4* 31.6*   RDW 48.9 51.4* 52.4*   PLATELETCT 374 384 383   MPV 10.9 10.5 10.8       Recent Labs     02/23/23 2259 02/25/23  0933 02/26/23  0413   SODIUM 132* 138 144   POTASSIUM 3.4* 3.3* 3.2*   CHLORIDE 98 107 111   CO2 20 18* 20   GLUCOSE 115* 115* 96   BUN 18 14 15   CREATININE 0.69 0.88 0.86   CALCIUM 10.1 8.9 9.0       Recent Labs     02/23/23 2259   INR 1.32*                 Imaging  CT-DRAIN-RETROPERITONEAL   Final Result      1.  CT guided placement of a percutaneous drainage catheter in a right psoas fluid collection.   2.  The current plan is to obtain a follow-up CT scan in  5-7 days.      US-EXTREMITY VENOUS LOWER BILAT   Final Result      OUTSIDE IMAGES-CT ABDOMEN /PELVIS   Final Result      [id number] VIEW   Final Result      Loops of bowel and colon are somewhat indistinct, possibly related to technique. Appearance appears overall somewhat similar to outside facility CT abdomen pelvis from 2/23/2023. If symptoms have changed from recent CT, CT evaluation is recommended.             Assessment/Plan  * Psoas abscess (HCC)- (present on admission)  Assessment & Plan  Presented to [facility] with Right flank pain  CT demonstrated psoas abscess prompting transfer to [facility] for IR  General surgery consulted and s/o, recommended IR-guided drainage  IR-guided drainage 2/24/23  Abscess culture +Citrobacter koseri  Continue zosyn for now, will consult ID tomorrow for timing of repeat CT and OPAT duration  HIV negative    External hemorrhoids  Assessment & Plan  Bowel protocol - senna QHS, miralax PRN    Other dysphagia- (present on admission)  Assessment & Plan  Resolved  SLP evaluation  Modified barium swallow deferred per SLP recommendation    Retroperitoneal mass- (present on admission)  Assessment & Plan  Underwent biopsy of Left renal pole mass extending into RP, it was not indicative of malignancy  Follow up with Oncologist Dr. Campos for further diagnostic evaluation after discharge    Hypokalemia- (present on admission)  Assessment & Plan  Likely due to inadequate PO intake from EtOH use DO  Mg WNL  Kdur 40 daily  Repeat AM BMP    Hyponatremia- (present on admission)  Assessment & Plan  Resolved with IVF  Likely due to poor PO intake from EtOH use DO  Repeat AM BMP    Microcytic anemia- (present on admission)  Assessment & Plan  Follows with Heme-Onc Dr. Campos  Elevated ferritin indicative of AOCD  Transfuse for Hgb <7    Alcohol withdrawal syndrome, with delirium (HCC)- (present on admission)  Assessment & Plan  Reports >2 beers daily, most recent 2/22/23  Developed diaphoresis, delirium,  tremor, tachycardia on HD#2  CIWA-Lorazepam protocol  Empiric MVN + B12 + Folate + Thiamine  Will discuss MAT / AA prior to discharge    Leg swelling- (present on admission)  Assessment & Plan  BLE US negative for DVT  TTE deferred    Acute encephalopathy- (present on admission)  Assessment & Plan  Resolved  Likely multifactorial including underlying abscess and EtOH withdrawal - see separate plans  Nonfocal, CTH deferred    Sepsis with encephalopathy without septic shock (HCC)- (present on admission)  Assessment & Plan  This is Sepsis Present on admission  SIRS criteria identified on my evaluation include: Fever, with temperature greater than 101 deg F, Tachypnea, with respirations greater than 20 per minute and Leukocytosis, with WBC greater than 12,000  Source is right psoas abscess  Sepsis protocol initiated  Fluid resuscitation ordered per protocol  Crystalloid Fluid Administration: Fluid resuscitation ordered per standard protocol - 30 mL/kg per current or ideal body weight  IV antibiotics as appropriate for source of sepsis  Reassessment: I have reassessed the patient's hemodynamic status    BCx 2/23: NGTD  Abscess Cx 2/24: +Citrobacter koseri  Continue zosyn for intra-abdominal abscess  Leukocytosis improving      Gastroesophageal reflux disease with esophagitis- (present on admission)  Assessment & Plan  Likely exacerbated by EtOH use DO  Continue omeprazole    OAB (overactive bladder)- (present on admission)  Assessment & Plan  Continue oxybutynin    Moderate episode of recurrent major depressive disorder (HCC)- (present on admission)  Assessment & Plan  Continue sertraline       VTE prophylaxis: SCDs/TEDs and pharmacologic prophylaxis contraindicated due to psoas abscess drain 2/24    I have performed a physical exam and reviewed and updated ROS and Plan today (2/26/2023). In review of yesterday's note (2/25/2023), there are no changes except as documented above.

## 2023-02-26 NOTE — THERAPY
Speech Language Pathology  Daily Treatment     Patient Name: Mena Campos  Age:  72 y.o., Sex:  female  Medical Record #: [id number]  Today's Date: 2/26/2023     Precautions  Precautions: (P) Fall Risk, Swallow Precautions ( See Comments)  Comments: (P) CIWA    Assessment    Pt seen this date for swallow reassessment, currently NPO, MBSS held 2/25 due to lethargy s/p Ativan. Pt seen with HOB at 90* and on 1L O2 via nasal cannula. PO trials of ice, thins by tsp/cup/straw, liquidized, puree, soft and bite sized and regular assessed. Timely swallow initiation, presumed complete hyolaryngeal elevation to palpation and clear vocal quality appreciated. Pt demonstrated functional mastication of all solids trialed with no oral residue appreciated. No s/sx of aspiration appreciated with any consistencies consumed.  Of note, pt denied hx of GERD or Zheng's esophagus, suspect pt is poor historian. Omeprazole is noted in her current medications. MBSS does not appear indicated at this time. Pt appears appropriate for initiation of a reg/thin diet with adherence to reflux precautions.    Initiation of a regular/thin liquid diet with adherence to safe swallow strategies and reflux precautions.   Meds as tolerate.d   Hold MBSS at this time, complete pending clinical progress.   Diligent oral care       Plan      Continue current treatment plan.  Discharge Recommendations: (P) Anticipate that the patient will have no further speech therapy needs after discharge from the hospital    Subjective    Pt alert, eager for PO, followed directions and participated fully. Pt emotionally labile at start of sessio but redirectable.      Objective     02/26/23 1052   Charge Group   SLP Swallowing Dysfunction Treatment Swallowing Dysfunction Treatment   Total Treatment Time   SLP Time Spent Yes   SLP Swallowing Dysfunction Treatment (Mins) 12   Precautions   Precautions Fall Risk;Swallow Precautions ( See Comments)   Comments [facility]  "  Vitals   O2 (LPM) 1   O2 Delivery Device Nasal Cannula   Pain 0 - 10 Group   Therapist Pain Assessment Post Activity Pain Same as Prior to Activity;Nurse Notified;0   Dysphagia    Dysphagia X   Positioning / Behavior Modification Self Monitoring;Modulate Rate or Bite Size   Other Treatments PO trials   Diet / Liquid Recommendation Thin (0);Regular (7)   Nutritional Liquid Intake Rating Scale Non thickened beverages   Nutritional Food Intake Rating Scale Total oral diet with no restrictions   Skilled Intervention Compensatory Strategies;Verbal Cueing   Recommended Route of Medication Administration   Medication Administration  Whole with Liquid Wash   Patient / Family Goals   Patient / Family Goal #1 \"I need water.\"   Goal #1 Outcome Goal met   Short Term Goals   Short Term Goal # 1 Pt will follow directives during MBS with moderate cues.   Goal Outcome # 1 Other (see comments)  (not addressed, MBSS not indicated at this tie)   Short Term Goal # 2 Pt will consume a diet of reg/thins with adherence to reflux precautions and no s/sx of aspiraiton with min cues   Education Group   Education Provided Dysphagia   Dysphagia Patient Response Patient;Acceptance;Demonstration;Explanation;Verbal Demonstration;Action Demonstration   Anticipated Discharge Needs   Discharge Recommendations Anticipate that the patient will have no further speech therapy needs after discharge from the hospital   Therapy Recommendations Upon DC Not Indicated   Interdisciplinary Plan of Care Collaboration   IDT Collaboration with  Nursing   Patient Position at End of Therapy In Bed;Seated;Bed Alarm On;Call Light within Reach;Tray Table within Reach;Phone within Reach   Collaboration Comments RN aware of results/recs       "

## 2023-02-26 NOTE — CARE PLAN
Problem: Hemodynamics  Goal: Patient's hemodynamics, fluid balance and neurologic status will be stable or improve  Outcome: Progressing     Problem: Urinary - Renal Perfusion  Goal: Ability to achieve and maintain adequate renal perfusion and functioning will improve  Outcome: Progressing     The patient is Watcher - Medium risk of patient condition declining or worsening    Shift Goals  Clinical Goals: Safety  Patient Goals: Swallow eval, oral swabs    Progress made toward(s) clinical / shift goals:  Pt started on thin liquids    Patient is not progressing towards the following goals:

## 2023-02-26 NOTE — CARE PLAN
The patient is Stable - Low risk of patient condition declining or worsening    Shift Goals  Clinical Goals: Safety  Patient Goals: Swallow eval, oral swabs    Progress made toward(s) clinical / shift goals:     Problem: Knowledge Deficit - Standard  Goal: Patient and family/care givers will demonstrate understanding of plan of care, disease process/condition, diagnostic tests and medications  Outcome: Progressing     Problem: Hemodynamics  Goal: Patient's hemodynamics, fluid balance and neurologic status will be stable or improve  Outcome: Progressing     Problem: Pain - Standard  Goal: Alleviation of pain or a reduction in pain to the patient’s comfort goal  Outcome: Progressing

## 2023-02-27 PROBLEM — R33.9 URINARY RETENTION: Status: ACTIVE | Noted: 2023-02-27

## 2023-02-27 LAB
ANION GAP SERPL CALC-SCNC: 9 MMOL/L (ref 7–16)
BACTERIA SPEC ANAEROBE CULT: NORMAL
BASOPHILS # BLD AUTO: 0.5 % (ref 0–1.8)
BASOPHILS # BLD: 0.06 K/UL (ref 0–0.12)
BUN SERPL-MCNC: 14 MG/DL (ref 8–22)
CALCIUM SERPL-MCNC: 8.6 MG/DL (ref 8.5–10.5)
CHLORIDE SERPL-SCNC: 104 MMOL/L (ref 96–112)
CO2 SERPL-SCNC: 21 MMOL/L (ref 20–33)
CREAT SERPL-MCNC: 0.69 MG/DL (ref 0.5–1.4)
EOSINOPHIL # BLD AUTO: 0.13 K/UL (ref 0–0.51)
EOSINOPHIL NFR BLD: 1.1 % (ref 0–6.9)
ERYTHROCYTE [DISTWIDTH] IN BLOOD BY AUTOMATED COUNT: 50.8 FL (ref 35.9–50)
GFR SERPLBLD CREATININE-BSD FMLA CKD-EPI: 92 ML/MIN/1.73 M 2
GLUCOSE SERPL-MCNC: 113 MG/DL (ref 65–99)
HCT VFR BLD AUTO: 28.9 % (ref 37–47)
HGB BLD-MCNC: 9.4 G/DL (ref 12–16)
IMM GRANULOCYTES # BLD AUTO: 0.07 K/UL (ref 0–0.11)
IMM GRANULOCYTES NFR BLD AUTO: 0.6 % (ref 0–0.9)
LYMPHOCYTES # BLD AUTO: 1.62 K/UL (ref 1–4.8)
LYMPHOCYTES NFR BLD: 13.8 % (ref 22–41)
MAGNESIUM SERPL-MCNC: 1.7 MG/DL (ref 1.5–2.5)
MCH RBC QN AUTO: 25.7 PG (ref 27–33)
MCHC RBC AUTO-ENTMCNC: 32.5 G/DL (ref 33.6–35)
MCV RBC AUTO: 79 FL (ref 81.4–97.8)
MONOCYTES # BLD AUTO: 0.54 K/UL (ref 0–0.85)
MONOCYTES NFR BLD AUTO: 4.6 % (ref 0–13.4)
NEUTROPHILS # BLD AUTO: 9.29 K/UL (ref 2–7.15)
NEUTROPHILS NFR BLD: 79.4 % (ref 44–72)
NRBC # BLD AUTO: 0 K/UL
NRBC BLD-RTO: 0 /100 WBC
PHOSPHATE SERPL-MCNC: 2.6 MG/DL (ref 2.5–4.5)
PLATELET # BLD AUTO: 352 K/UL (ref 164–446)
PMV BLD AUTO: 10.9 FL (ref 9–12.9)
POTASSIUM SERPL-SCNC: 3.5 MMOL/L (ref 3.6–5.5)
RBC # BLD AUTO: 3.66 M/UL (ref 4.2–5.4)
SIGNIFICANT IND 70042: NORMAL
SITE SITE: NORMAL
SODIUM SERPL-SCNC: 134 MMOL/L (ref 135–145)
SOURCE SOURCE: NORMAL
WBC # BLD AUTO: 11.7 K/UL (ref 4.8–10.8)

## 2023-02-27 PROCEDURE — 700111 HCHG RX REV CODE 636 W/ 250 OVERRIDE (IP): Performed by: HOSPITALIST

## 2023-02-27 PROCEDURE — 700102 HCHG RX REV CODE 250 W/ 637 OVERRIDE(OP): Performed by: STUDENT IN AN ORGANIZED HEALTH CARE EDUCATION/TRAINING PROGRAM

## 2023-02-27 PROCEDURE — 770004 HCHG ROOM/CARE - ONCOLOGY PRIVATE *

## 2023-02-27 PROCEDURE — 84100 ASSAY OF PHOSPHORUS: CPT

## 2023-02-27 PROCEDURE — 36415 COLL VENOUS BLD VENIPUNCTURE: CPT

## 2023-02-27 PROCEDURE — A9270 NON-COVERED ITEM OR SERVICE: HCPCS | Performed by: HOSPITALIST

## 2023-02-27 PROCEDURE — 80048 BASIC METABOLIC PNL TOTAL CA: CPT

## 2023-02-27 PROCEDURE — 83735 ASSAY OF MAGNESIUM: CPT

## 2023-02-27 PROCEDURE — 700105 HCHG RX REV CODE 258: Performed by: HOSPITALIST

## 2023-02-27 PROCEDURE — 99232 SBSQ HOSP IP/OBS MODERATE 35: CPT | Performed by: STUDENT IN AN ORGANIZED HEALTH CARE EDUCATION/TRAINING PROGRAM

## 2023-02-27 PROCEDURE — 85025 COMPLETE CBC W/AUTO DIFF WBC: CPT

## 2023-02-27 PROCEDURE — A9270 NON-COVERED ITEM OR SERVICE: HCPCS | Performed by: STUDENT IN AN ORGANIZED HEALTH CARE EDUCATION/TRAINING PROGRAM

## 2023-02-27 PROCEDURE — 700102 HCHG RX REV CODE 250 W/ 637 OVERRIDE(OP): Performed by: HOSPITALIST

## 2023-02-27 PROCEDURE — 99223 1ST HOSP IP/OBS HIGH 75: CPT | Performed by: INTERNAL MEDICINE

## 2023-02-27 RX ADMIN — FOLIC ACID 1 MG: 1 TABLET ORAL at 04:34

## 2023-02-27 RX ADMIN — OXYCODONE 5 MG: 5 TABLET ORAL at 13:04

## 2023-02-27 RX ADMIN — QUETIAPINE FUMARATE 50 MG: 25 TABLET ORAL at 22:11

## 2023-02-27 RX ADMIN — Medication 100 MG: at 04:33

## 2023-02-27 RX ADMIN — PIPERACILLIN AND TAZOBACTAM 4.5 G: 4; .5 INJECTION, POWDER, LYOPHILIZED, FOR SOLUTION INTRAVENOUS; PARENTERAL at 13:06

## 2023-02-27 RX ADMIN — THERA TABS 1 TABLET: TAB at 04:35

## 2023-02-27 RX ADMIN — OMEPRAZOLE 40 MG: 20 CAPSULE, DELAYED RELEASE ORAL at 04:33

## 2023-02-27 RX ADMIN — OXYBUTYNIN CHLORIDE 10 MG: 10 TABLET, EXTENDED RELEASE ORAL at 04:34

## 2023-02-27 RX ADMIN — PIPERACILLIN AND TAZOBACTAM 4.5 G: 4; .5 INJECTION, POWDER, LYOPHILIZED, FOR SOLUTION INTRAVENOUS; PARENTERAL at 04:40

## 2023-02-27 RX ADMIN — OXYCODONE 5 MG: 5 TABLET ORAL at 22:15

## 2023-02-27 RX ADMIN — SERTRALINE 100 MG: 100 TABLET, FILM COATED ORAL at 16:52

## 2023-02-27 RX ADMIN — OXYCODONE 5 MG: 5 TABLET ORAL at 09:53

## 2023-02-27 RX ADMIN — OXYCODONE 5 MG: 5 TABLET ORAL at 16:52

## 2023-02-27 RX ADMIN — PIPERACILLIN AND TAZOBACTAM 4.5 G: 4; .5 INJECTION, POWDER, LYOPHILIZED, FOR SOLUTION INTRAVENOUS; PARENTERAL at 22:11

## 2023-02-27 RX ADMIN — SERTRALINE 100 MG: 100 TABLET, FILM COATED ORAL at 04:33

## 2023-02-27 RX ADMIN — POTASSIUM CHLORIDE 40 MEQ: 1500 TABLET, EXTENDED RELEASE ORAL at 04:33

## 2023-02-27 RX ADMIN — Medication 5 MG: at 22:11

## 2023-02-27 ASSESSMENT — LIFESTYLE VARIABLES
TOTAL SCORE: 0
ANXIETY: NO ANXIETY (AT EASE)
AGITATION: NORMAL ACTIVITY
NAUSEA AND VOMITING: NO NAUSEA AND NO VOMITING
HEADACHE, FULLNESS IN HEAD: NOT PRESENT
HEADACHE, FULLNESS IN HEAD: NOT PRESENT
AGITATION: NORMAL ACTIVITY
VISUAL DISTURBANCES: NOT PRESENT
TREMOR: TREMOR NOT VISIBLE BUT CAN BE FELT, FINGERTIP TO FINGERTIP
NAUSEA AND VOMITING: NO NAUSEA AND NO VOMITING
HEADACHE, FULLNESS IN HEAD: NOT PRESENT
ORIENTATION AND CLOUDING OF SENSORIUM: ORIENTED AND CAN DO SERIAL ADDITIONS
PAROXYSMAL SWEATS: NO SWEAT VISIBLE
TREMOR: NO TREMOR
VISUAL DISTURBANCES: NOT PRESENT
AUDITORY DISTURBANCES: NOT PRESENT
ANXIETY: NO ANXIETY (AT EASE)
AGITATION: NORMAL ACTIVITY
PAROXYSMAL SWEATS: NO SWEAT VISIBLE
AUDITORY DISTURBANCES: NOT PRESENT
AUDITORY DISTURBANCES: NOT PRESENT
PAROXYSMAL SWEATS: NO SWEAT VISIBLE
AGITATION: NORMAL ACTIVITY
PAROXYSMAL SWEATS: NO SWEAT VISIBLE
VISUAL DISTURBANCES: NOT PRESENT
NAUSEA AND VOMITING: NO NAUSEA AND NO VOMITING
ANXIETY: NO ANXIETY (AT EASE)
NAUSEA AND VOMITING: NO NAUSEA AND NO VOMITING
TOTAL SCORE: 0
TOTAL SCORE: 0
ORIENTATION AND CLOUDING OF SENSORIUM: ORIENTED AND CAN DO SERIAL ADDITIONS
VISUAL DISTURBANCES: NOT PRESENT
AUDITORY DISTURBANCES: NOT PRESENT
HEADACHE, FULLNESS IN HEAD: NOT PRESENT
TOTAL SCORE: 1
TREMOR: NO TREMOR
TREMOR: NO TREMOR
ANXIETY: NO ANXIETY (AT EASE)

## 2023-02-27 ASSESSMENT — ENCOUNTER SYMPTOMS
DIARRHEA: 0
EYE PAIN: 0
FLANK PAIN: 1
BLOOD IN STOOL: 0
CHILLS: 0
BACK PAIN: 0
CONSTIPATION: 1
HEADACHES: 0
SHORTNESS OF BREATH: 0
BRUISES/BLEEDS EASILY: 0
NAUSEA: 0
NECK PAIN: 0
NERVOUS/ANXIOUS: 0
DEPRESSION: 1
ABDOMINAL PAIN: 1
VOMITING: 0
SORE THROAT: 0
SINUS PAIN: 0
MYALGIAS: 0
FEVER: 0
HEMOPTYSIS: 0

## 2023-02-27 ASSESSMENT — PAIN DESCRIPTION - PAIN TYPE
TYPE: ACUTE PAIN
TYPE: ACUTE PAIN

## 2023-02-27 NOTE — DIETARY
Nutrition Services Brief Update:    Day 4 of admit.  Mean Saenz Andres being followed by Nutrition to optimize intake    Current Diet: Regular    Problem: Nutritional:  Goal: Achieve adequate nutritional intake  Description: Patient will consume 50% of meals  Outcome: Not met    Patient reported she has only been able to sip on juices so far.  She reported intense pain is contributing to poor intake.  She stated she likes milk and Boost and will try to drink them.    Supplements and milk TID added.    Nutrition Representative will continue to see her for ongoing meal and snack preferences.  RD following.

## 2023-02-27 NOTE — DISCHARGE PLANNING
Case Management Discharge Planning    Admission Date: 2/23/2023  GMLOS: 5  ALOS: 4    6-Clicks ADL Score: 18  6-Clicks Mobility Score: 18      Anticipated Discharge Dispo: Discharge Disposition: Discharged to home/self care (01)    DME Needed: No    Action(s) Taken: Updated Provider/Nurse on Discharge Plan    Escalations Completed: Provider and Bedside RN    Medically Clear: No    Next Steps: Patient discussed during morning IDT rounds with team. Patient is not medically cleared for discharge. CM to continue to follow for discharge planning needs, no needs at this time.     Barriers to Discharge: Medical clearance    Is the patient up for discharge tomorrow: No        Initial (On Arrival)

## 2023-02-27 NOTE — CARE PLAN
Problem: Pain - Standard  Goal: Alleviation of pain or a reduction in pain to the patient’s comfort goal  Outcome: Progressing     Problem: Fall Risk  Goal: Patient will remain free from falls  Outcome: Progressing     The patient is Watcher - Medium risk of patient condition declining or worsening    Shift Goals  Clinical Goals: safety, pain control  Patient Goals: sleep    Progress made toward(s) clinical / shift goals:  Pt medicated per MAR    Patient is not progressing towards the following goals:

## 2023-02-27 NOTE — CONSULTS
INFECTIOUS DISEASES INPATIENT CONSULT NOTE     Date of Service:2/27/2023    Consult Requested By: Lg Gil M.D.    Reason for Consultation: psoas abscess    History of Present Illness:   Mena Campos is a 72 y.o. female admitted 2/23/2023 as a transfer for worsening right-sided flank pain due to mass  Reported h/o repeated urine infection  Pain X 2 months.  She was found to have right-sided kidney mass on January 10.  She had a biopsy of this mass on February 6.cw abscess rather than malignancy    Repeat CT scan showed over 14 X11 cm multiloculated mass/ psoas abscess.    Surgery consulted-deferred. IR aspiration/drain placement done 2/24  Hosp course complicated by EtOH withdrawl  Culture citrobacter  Currently on Zosyn  Infectious Diseases consulted for antibiotic recommendation and management       Review Of Systems:  Unable to obtain due to AMS    PMH:   Past Medical History:   Diagnosis Date    Zheng's esophagus     Cataract     Depression     Fracture of humeral head, right, closed 2005    fall standing height    Fracture, femur closed, shaft (HCC) 8/12/2013    right-fall standing height    GERD (gastroesophageal reflux disease)     H/O measles     History of chickenpox     IBD (inflammatory bowel disease)     Kyphosis     OSTEOPOROSIS     Wrist fracture, left 2004    fall standing height         PSH:  Past Surgical History:   Procedure Laterality Date    CATARACT EXTRACTION WITH IOL Bilateral 3/2016    ORIF, FRACTURE, FEMUR  8/12/2013    right-Rogalski    ORIF, FRACTURE, HUMERUS, PROXIMAL Right     Dr Herrera       FAMILY HX:  Family History   Problem Relation Age of Onset    Osteoporosis Mother     Cancer Father        SOCIAL HX:  Social History     Socioeconomic History    Marital status:      Spouse name: Not on file    Number of children: Not on file    Years of education: Not on file    Highest education level: Not on file   Occupational History    Not on file   Tobacco Use     Smoking status: Former     Packs/day: 0.50     Years: 35.00     Pack years: 17.50     Types: Cigarettes     Quit date: 2010     Years since quittin.0    Smokeless tobacco: Never   Vaping Use    Vaping Use: Not on file   Substance and Sexual Activity    Alcohol use: Yes     Alcohol/week: 8.4 oz     Types: 14 Cans of beer per week    Drug use: No    Sexual activity: Not on file   Other Topics Concern    Not on file   Social History Narrative    Not on file     Social Determinants of Health     Financial Resource Strain: Not on file   Food Insecurity: Not on file   Transportation Needs: Not on file   Physical Activity: Not on file   Stress: Not on file   Social Connections: Not on file   Intimate Partner Violence: Not on file   Housing Stability: Not on file     Social History     Tobacco Use   Smoking Status Former    Packs/day: 0.50    Years: 35.00    Pack years: 17.50    Types: Cigarettes    Quit date: 2010    Years since quittin.0   Smokeless Tobacco Never     Social History     Substance and Sexual Activity   Alcohol Use Yes    Alcohol/week: 8.4 oz    Types: 14 Cans of beer per week       Allergies/Intolerances:  Allergies   Allergen Reactions    Seasonal     Wellbutrin [Bupropion Hcl]      Had a siezure         Other Current Medications:    Current Facility-Administered Medications:     potassium chloride SA (Kdur) tablet 40 mEq, 40 mEq, Oral, DAILY, Lg Gil M.D., 40 mEq at 23 0433    sennosides (SENOKOT) 8.6 MG tablet 17.2 mg, 17.2 mg, Oral, QHS, Lg Gil M.D., 17.2 mg at 23 171    LORazepam (ATIVAN) tablet 0.5 mg, 0.5 mg, Oral, Q4HRS PRN **OR** LORazepam (ATIVAN) injection 0.5 mg, 0.5 mg, Intravenous, Q4HRS PRN, Lg Gil M.D., 0.5 mg at 23    QUEtiapine (Seroquel) tablet 50 mg, 50 mg, Oral, Nightly, Lg Gil M.D., 50 mg at 23    melatonin tablet 5 mg, 5 mg, Oral, Nightly, Lg Gil M.D., 5 mg at 23    LORazepam  (ATIVAN) tablet 1 mg, 1 mg, Oral, Q4HRS PRN **OR** LORazepam (ATIVAN) injection 0.5 mg, 0.5 mg, Intravenous, Q4HRS PRN, Lg Gil M.D., 0.5 mg at 02/24/23 1215    LORazepam (ATIVAN) tablet 2 mg, 2 mg, Oral, Q2HRS PRN **OR** LORazepam (ATIVAN) injection 1 mg, 1 mg, Intravenous, Q2HRS PRN, Lg Gil M.D., 1 mg at 02/25/23 1053    LORazepam (ATIVAN) tablet 3 mg, 3 mg, Oral, Q HOUR PRN **OR** LORazepam (ATIVAN) injection 1.5 mg, 1.5 mg, Intravenous, Q HOUR PRN, Lg Gil M.D.    LORazepam (ATIVAN) tablet 4 mg, 4 mg, Oral, Q15 MIN PRN **OR** LORazepam (ATIVAN) injection 2 mg, 2 mg, Intravenous, Q15 MIN PRN, Lg Gil M.D.    [COMPLETED] detox IV 1000 mL (D5LR + magnesium 1 g + thiamine 100 mg + folic acid 1 mg) infusion, , Intravenous, Once, Last Rate: 250 mL/hr at 02/24/23 1825, New Bag at 02/24/23 1825 **AND** thiamine (Vitamin B-1) tablet 100 mg, 100 mg, Oral, DAILY, 100 mg at 02/27/23 0433 **AND** multivitamin tablet 1 Tablet, 1 Tablet, Oral, DAILY, 1 Tablet at 02/27/23 0435 **AND** folic acid (FOLVITE) tablet 1 mg, 1 mg, Oral, DAILY, Lg Gil M.D., 1 mg at 02/27/23 0434    omeprazole (PRILOSEC) capsule 40 mg, 40 mg, Oral, DAILY, Lg Gil M.D., 40 mg at 02/27/23 0433    [DISCONTINUED] senna-docusate (PERICOLACE or SENOKOT S) 8.6-50 MG per tablet 2 Tablet, 2 Tablet, Oral, BID, 2 Tablet at 02/24/23 0528 **AND** polyethylene glycol/lytes (MIRALAX) PACKET 1 Packet, 1 Packet, Oral, QDAY PRN, 1 Packet at 02/26/23 1714 **AND** magnesium hydroxide (MILK OF MAGNESIA) suspension 30 mL, 30 mL, Oral, QDAY PRN **AND** bisacodyl (DULCOLAX) suppository 10 mg, 10 mg, Rectal, QDAY PRN, Magaly Cr M.D.    acetaminophen (Tylenol) tablet 650 mg, 650 mg, Oral, Q6HRS PRN, Magaly Cr M.D.    Notify provider if pain remains uncontrolled, , , CONTINUOUS **AND** Use the Numeric Rating Scale (NRS), Flores-Baker Faces (WBF), or FLACC on regular floors and Critical-Care Pain Observation Tool (CPOT) on  ICUs/Trauma to assess pain, , , CONTINUOUS **AND** Pulse Ox, , , CONTINUOUS **AND** Pharmacy Consult Request ...Pain Management Review 1 Each, 1 Each, Other, PHARMACY TO DOSE **AND** If patient difficult to arouse and/or has respiratory depression (respiratory rate of 10 or less), stop any opiates that are currently infusing and call a Rapid Response., , , CONTINUOUS, Magaly Cr M.D.    oxyCODONE immediate-release (ROXICODONE) tablet 2.5 mg, 2.5 mg, Oral, Q3HRS PRN **OR** oxyCODONE immediate-release (ROXICODONE) tablet 5 mg, 5 mg, Oral, Q3HRS PRN, 5 mg at 23 0953 **OR** morphine 4 MG/ML injection 2 mg, 2 mg, Intravenous, Q3HRS PRN, Magaly Cr M.D., 2 mg at 23 1730    ondansetron (ZOFRAN) syringe/vial injection 4 mg, 4 mg, Intravenous, Q4HRS PRN, Magaly Cr M.D.    ondansetron (ZOFRAN ODT) dispertab 4 mg, 4 mg, Oral, Q4HRS PRN, Magaly Cr M.D.    sertraline (Zoloft) tablet 100 mg, 100 mg, Oral, BID, Magaly Cr M.D., 100 mg at 23 0433    oxybutynin SR (DITROPAN-XL) tablet 10 mg, 10 mg, Oral, DAILY, Magaly Cr M.D., 10 mg at 23 0434    [COMPLETED] piperacillin-tazobactam (Zosyn) 4.5 g in  mL IVPB, 4.5 g, Intravenous, Once, Stopped at 23 0022 **AND** piperacillin-tazobactam (Zosyn) 4.5 g in  mL IVPB, 4.5 g, Intravenous, Q8HRS, Magaly Cr M.D., Stopped at 23 0840      Most Recent Vital Signs:  BP (!) 148/77   Pulse 80   Temp 36.4 °C (97.5 °F) (Temporal)   Resp 16   Wt 44 kg (97 lb)   SpO2 95%   Breastfeeding No   BMI 15.66 kg/m²   Temp  Av.6 °C (97.9 °F)  Min: 36.2 °C (97.1 °F)  Max: 37.7 °C (99.9 °F)    Physical Exam:  General: chronically ill no acute distress  HEENT: NCAT, PERRLA, sclera anicteric  Neck: supple  Chest: unlabored.  Cardiac: not tachy  Abdomen:  non-distended +drain  Extremities: No cyanosis, clubbing. Muscle wasting  Skin: no rashes   Neuro: disoriented  Psych: unable to assess    Pertinent Lab Results:  Recent Labs      02/25/23 0933 02/26/23 0413 02/27/23  0035   WBC 13.7* 12.2* 11.7*      Recent Labs     02/25/23 0933 02/26/23 0413 02/27/23 0035   HEMOGLOBIN 9.1* 10.2* 9.4*   HEMATOCRIT 28.1* 32.3* 28.9*   MCV 79.8* 80.3* 79.0*   MCH 25.9* 25.4* 25.7*   PLATELETCT 384 383 352         Recent Labs     02/25/23 0933 02/26/23 0413 02/27/23 0035   SODIUM 138 144 134*   POTASSIUM 3.3* 3.2* 3.5*   CHLORIDE 107 111 104   CO2 18* 20 21   CREATININE 0.88 0.86 0.69        Recent Labs     02/25/23 0933   ALBUMIN 2.4*        Pertinent Micro:  Results       Procedure Component Value Units Date/Time    CULTURE WOUND W/ GRAM STAIN [[id number]]  (Abnormal)  (Susceptibility) Collected: 02/24/23 1700    Order Status: Completed Specimen: Wound Updated: 02/26/23 1117     Significant Indicator POS     Source WND     Site Right Psoas     Culture Result -     Gram Stain Result Moderate WBCs.  No organisms seen.       Culture Result Citrobacter koseri  Moderate growth      Susceptibility       Citrobacter koseri (1)       Antibiotic Interpretation Microscan   Method Status    Ampicillin Resistant >16 mcg/mL ROBERTH Final    Ceftriaxone Sensitive <=1 mcg/mL ROBERTH Final    Cefazolin Sensitive <=2 mcg/mL ROBERTH Final    Ciprofloxacin Sensitive <=0.25 mcg/mL ROBERTH Final    Cefepime Sensitive <=2 mcg/mL ROBERTH Final    Cefuroxime Sensitive <=4 mcg/mL ROBERTH Final    Ampicillin/sulbactam Sensitive <=4/2 mcg/mL ROBERTH Final    Ertapenem Sensitive <=0.5 mcg/mL ROBERTH Final    Tobramycin Sensitive <=2 mcg/mL ROBERTH Final    Gentamicin Sensitive <=2 mcg/mL ROBERTH Final    Minocycline Sensitive <=4 mcg/mL ROBERTH Final    Moxifloxacin Sensitive <=2 mcg/mL ROBERTH Final    Pip/Tazobactam Sensitive <=8 mcg/mL ROBERTH Final    Trimeth/Sulfa Sensitive <=0.5/9.5 mcg/mL ROBERTH Final    Tigecycline Sensitive <=2 mcg/mL ROBERTH Final                       Anaerobic Culture [[id number]] Collected: 02/24/23 1700    Order Status: Completed Specimen: Wound Updated: 02/26/23 1117     Significant Indicator NEG      Source WND     Site Right Psoas     Culture Result Culture in progress.    GRAM STAIN [[id number]] Collected: 02/24/23 1700    Order Status: Completed Specimen: Wound Updated: 02/25/23 0528     Significant Indicator .     Source WND     Site Right Psoas     Gram Stain Result Moderate WBCs.  No organisms seen.      FLUID CULTURE W/GRAM STAIN [[id number]]     Order Status: No result Specimen: Body Fluid from Peritoneal Fluid     Aerobic/Anaerobic Culture (Surgery) [[id number]]     Order Status: No result Specimen: Other from Peritoneal Fluid     URINALYSIS [[id number]]     Order Status: No result Specimen: Urine           No results found for: BLOODCULTU, BLDCULT, BCHOLD     Studies:  IMPRESSION:     1.  Left-sided lung base and perirenal soft tissue mass of nearly completely resolved. This may suggest a previous focal hemorrhage and left basilar atelectasis or infection but in any case, a benign etiology is suggested.  2.  There is extensive progression of the mixed cystic and solid lesion involving the retroperitoneum on the right. This mass now extends to near the skin surface with a maximal dimension of 11.4 cm x 14.4 cm. Given a larger cystic component extending   along the psoas muscle and the previous findings from the biopsy, tumoral hemorrhage between the January 10 study and February 6 study is favored with progressive hemorrhage from February 6 to today. Blood products may now have become infected given the   large cystic components on the current exam. Underlying tumor as indicated on the January 10 study remains strong possibility.  IMPRESSION:   Psoas abscess  Urinary retention  Leukocytosis        PLAN:   S/p IR drainage-multiloculated so unclear if source control  Plan for repeat CT this work to assess adequacy of drainage  Further work up of malignancy per PCT  Final antibiotic recommendations per CT results and clinical course    Midline-likely no as oral options  Plan of care discussed with CHANTAL SANCHEZ  BETH Gil. Will continue to follow    Leela Pop M.D.

## 2023-02-28 ENCOUNTER — APPOINTMENT (OUTPATIENT)
Dept: RADIOLOGY | Facility: MEDICAL CENTER | Age: 73
DRG: 871 | End: 2023-02-28
Attending: STUDENT IN AN ORGANIZED HEALTH CARE EDUCATION/TRAINING PROGRAM
Payer: MEDICARE

## 2023-02-28 LAB
C DIFF DNA SPEC QL NAA+PROBE: NEGATIVE
C DIFF TOX GENS STL QL NAA+PROBE: NEGATIVE

## 2023-02-28 PROCEDURE — A9270 NON-COVERED ITEM OR SERVICE: HCPCS | Performed by: HOSPITALIST

## 2023-02-28 PROCEDURE — 700105 HCHG RX REV CODE 258: Performed by: HOSPITALIST

## 2023-02-28 PROCEDURE — A9270 NON-COVERED ITEM OR SERVICE: HCPCS | Performed by: INTERNAL MEDICINE

## 2023-02-28 PROCEDURE — 700117 HCHG RX CONTRAST REV CODE 255: Performed by: STUDENT IN AN ORGANIZED HEALTH CARE EDUCATION/TRAINING PROGRAM

## 2023-02-28 PROCEDURE — 770004 HCHG ROOM/CARE - ONCOLOGY PRIVATE *

## 2023-02-28 PROCEDURE — 700102 HCHG RX REV CODE 250 W/ 637 OVERRIDE(OP): Performed by: STUDENT IN AN ORGANIZED HEALTH CARE EDUCATION/TRAINING PROGRAM

## 2023-02-28 PROCEDURE — 74177 CT ABD & PELVIS W/CONTRAST: CPT

## 2023-02-28 PROCEDURE — 99233 SBSQ HOSP IP/OBS HIGH 50: CPT | Performed by: INTERNAL MEDICINE

## 2023-02-28 PROCEDURE — 700102 HCHG RX REV CODE 250 W/ 637 OVERRIDE(OP): Performed by: HOSPITALIST

## 2023-02-28 PROCEDURE — 700111 HCHG RX REV CODE 636 W/ 250 OVERRIDE (IP): Performed by: HOSPITALIST

## 2023-02-28 PROCEDURE — A9270 NON-COVERED ITEM OR SERVICE: HCPCS | Performed by: STUDENT IN AN ORGANIZED HEALTH CARE EDUCATION/TRAINING PROGRAM

## 2023-02-28 PROCEDURE — 700102 HCHG RX REV CODE 250 W/ 637 OVERRIDE(OP): Performed by: INTERNAL MEDICINE

## 2023-02-28 PROCEDURE — 87493 C DIFF AMPLIFIED PROBE: CPT

## 2023-02-28 PROCEDURE — 700111 HCHG RX REV CODE 636 W/ 250 OVERRIDE (IP): Performed by: INTERNAL MEDICINE

## 2023-02-28 RX ORDER — ENOXAPARIN SODIUM 100 MG/ML
40 INJECTION SUBCUTANEOUS DAILY
Status: DISCONTINUED | OUTPATIENT
Start: 2023-02-28 | End: 2023-03-12 | Stop reason: HOSPADM

## 2023-02-28 RX ORDER — OXYCODONE HYDROCHLORIDE 10 MG/1
10 TABLET ORAL
Status: DISCONTINUED | OUTPATIENT
Start: 2023-02-28 | End: 2023-03-12 | Stop reason: HOSPADM

## 2023-02-28 RX ORDER — LANOLIN ALCOHOL/MO/W.PET/CERES
400 CREAM (GRAM) TOPICAL 2 TIMES DAILY
Status: COMPLETED | OUTPATIENT
Start: 2023-02-28 | End: 2023-02-28

## 2023-02-28 RX ORDER — OXYCODONE HYDROCHLORIDE 5 MG/1
5 TABLET ORAL
Status: DISCONTINUED | OUTPATIENT
Start: 2023-02-28 | End: 2023-03-12 | Stop reason: HOSPADM

## 2023-02-28 RX ORDER — MORPHINE SULFATE 4 MG/ML
4 INJECTION INTRAVENOUS
Status: DISCONTINUED | OUTPATIENT
Start: 2023-02-28 | End: 2023-03-12 | Stop reason: HOSPADM

## 2023-02-28 RX ADMIN — OXYCODONE 5 MG: 5 TABLET ORAL at 19:40

## 2023-02-28 RX ADMIN — MAGNESIUM GLUCONATE 500 MG ORAL TABLET 400 MG: 500 TABLET ORAL at 10:00

## 2023-02-28 RX ADMIN — PIPERACILLIN AND TAZOBACTAM 4.5 G: 4; .5 INJECTION, POWDER, LYOPHILIZED, FOR SOLUTION INTRAVENOUS; PARENTERAL at 21:12

## 2023-02-28 RX ADMIN — FOLIC ACID 1 MG: 1 TABLET ORAL at 05:09

## 2023-02-28 RX ADMIN — QUETIAPINE FUMARATE 50 MG: 25 TABLET ORAL at 21:10

## 2023-02-28 RX ADMIN — OXYCODONE 5 MG: 5 TABLET ORAL at 16:01

## 2023-02-28 RX ADMIN — DIBASIC SODIUM PHOSPHATE, MONOBASIC POTASSIUM PHOSPHATE AND MONOBASIC SODIUM PHOSPHATE 500 MG: 852; 155; 130 TABLET ORAL at 11:35

## 2023-02-28 RX ADMIN — PIPERACILLIN AND TAZOBACTAM 4.5 G: 4; .5 INJECTION, POWDER, LYOPHILIZED, FOR SOLUTION INTRAVENOUS; PARENTERAL at 05:11

## 2023-02-28 RX ADMIN — IOHEXOL 100 ML: 350 INJECTION, SOLUTION INTRAVENOUS at 09:53

## 2023-02-28 RX ADMIN — MAGNESIUM GLUCONATE 500 MG ORAL TABLET 400 MG: 500 TABLET ORAL at 18:00

## 2023-02-28 RX ADMIN — SERTRALINE 100 MG: 100 TABLET, FILM COATED ORAL at 18:29

## 2023-02-28 RX ADMIN — SERTRALINE 100 MG: 100 TABLET, FILM COATED ORAL at 05:09

## 2023-02-28 RX ADMIN — ENOXAPARIN SODIUM 40 MG: 40 INJECTION SUBCUTANEOUS at 21:10

## 2023-02-28 RX ADMIN — OXYCODONE 5 MG: 5 TABLET ORAL at 08:47

## 2023-02-28 RX ADMIN — OMEPRAZOLE 40 MG: 20 CAPSULE, DELAYED RELEASE ORAL at 05:09

## 2023-02-28 RX ADMIN — PIPERACILLIN AND TAZOBACTAM 4.5 G: 4; .5 INJECTION, POWDER, LYOPHILIZED, FOR SOLUTION INTRAVENOUS; PARENTERAL at 14:04

## 2023-02-28 RX ADMIN — DIBASIC SODIUM PHOSPHATE, MONOBASIC POTASSIUM PHOSPHATE AND MONOBASIC SODIUM PHOSPHATE 500 MG: 852; 155; 130 TABLET ORAL at 18:29

## 2023-02-28 RX ADMIN — Medication 100 MG: at 05:09

## 2023-02-28 RX ADMIN — Medication 5 MG: at 21:10

## 2023-02-28 RX ADMIN — THERA TABS 1 TABLET: TAB at 05:09

## 2023-02-28 RX ADMIN — POTASSIUM CHLORIDE 40 MEQ: 1500 TABLET, EXTENDED RELEASE ORAL at 05:09

## 2023-02-28 ASSESSMENT — LIFESTYLE VARIABLES
PAROXYSMAL SWEATS: NO SWEAT VISIBLE
VISUAL DISTURBANCES: NOT PRESENT
HEADACHE, FULLNESS IN HEAD: NOT PRESENT
AUDITORY DISTURBANCES: NOT PRESENT
ANXIETY: NO ANXIETY (AT EASE)
TREMOR: NO TREMOR
AGITATION: NORMAL ACTIVITY
ANXIETY: NO ANXIETY (AT EASE)
AGITATION: NORMAL ACTIVITY
VISUAL DISTURBANCES: NOT PRESENT
HEADACHE, FULLNESS IN HEAD: NOT PRESENT
ORIENTATION AND CLOUDING OF SENSORIUM: ORIENTED AND CAN DO SERIAL ADDITIONS
NAUSEA AND VOMITING: NO NAUSEA AND NO VOMITING
TOTAL SCORE: 0
NAUSEA AND VOMITING: NO NAUSEA AND NO VOMITING
ORIENTATION AND CLOUDING OF SENSORIUM: ORIENTED AND CAN DO SERIAL ADDITIONS
AUDITORY DISTURBANCES: NOT PRESENT
TREMOR: NO TREMOR
TOTAL SCORE: 0
PAROXYSMAL SWEATS: NO SWEAT VISIBLE

## 2023-02-28 ASSESSMENT — ENCOUNTER SYMPTOMS
CHILLS: 0
SINUS PAIN: 0
NECK PAIN: 0
BRUISES/BLEEDS EASILY: 0
BACK PAIN: 0
NERVOUS/ANXIOUS: 0
HEMOPTYSIS: 0
BLOOD IN STOOL: 0
MYALGIAS: 0
FEVER: 0
VOMITING: 0
SORE THROAT: 0
HEADACHES: 0
SHORTNESS OF BREATH: 0
FLANK PAIN: 1
ABDOMINAL PAIN: 1
CONSTIPATION: 1
EYE PAIN: 0
TREMORS: 1
DEPRESSION: 1
NAUSEA: 0
DIARRHEA: 0

## 2023-02-28 ASSESSMENT — PAIN DESCRIPTION - PAIN TYPE: TYPE: ACUTE PAIN

## 2023-02-28 NOTE — DOCUMENTATION QUERY
[facility]                                                                       Query Response Note      PATIENT:               AUBREY BEAN  ACCT #:                  [id number]  MRN:                     [id number]  :                      1950  ADMIT DATE:       2023 9:41 PM  DISCH DATE:          RESPONDING  PROVIDER #:        [id number]           QUERY TEXT:    Based on the diagnostic and clinical indicators documented below, can a diagnosis be made?    NOTE- If an appropriate response is not listed below, please respond with a new note.      The patient's clinical indicators include:  / Dietary PN - BMI:  15.66; Meets ASPEN criteria for severe chronic disease related malnutrition as evidenced by 15% weight loss in <3 months, severe muscle and severe fat losses, protruding clavicles, and depressed temple region.    Treatment - Dietary consult; monitor weight, resume PO diet when clinically feasible    Risk factors - BMI 15.66, weight loss, severe muscle/fat loss    Thank you,  Marianna Self BSN  Clinical   Connect via [employer]  Options provided:   -- Severe chronic disease related malnutrition is ruled in   -- Findings of no clinical significance   -- Other explanation, (please specify the other explanation)   -- Unable to determine      Query created by: Marianna Self on 2023 3:28 PM    RESPONSE TEXT:    Severe chronic disease related malnutrition is ruled in       QUERY TEXT:    Based on the clinical indicators documented below and after further study, can the diagnosis of acute encephalopathy be further clarified?    NOTE- If an appropriate response is not listed below, please respond with a new note.      The patient's clinical indicators include:  H&P -  Patient is confused and the history was taken by  on the phone; Dx Acute encephalopathy - secondary to infectious  etiology  2/24 PN - affirmed that she drinks multiple beers daily to cope with pain and most recently would have been Wednesday (day before arrival); Developed diaphoresis, delirium, tremor, tachycardia on HD#2; Dx Acute encephalopathy - Likely multifactorial including underlying abscess and EtOH withdrawal    Treatment - IV Piperacillin-tazobactam; IV Detox bag; IV Lorazepam; IV LR infusion    Risk factors - Sepsis, psoas muscle abscess, drinks multiple beers daily, encephalopathy, alcohol withdrawal    Thank you,  Marianna Self BSN  Clinical   Connect via [employer]  Options provided:   -- Acute metabolic and toxic encephalopathy due to sepsis and alcohol withdrawal   -- Acute metabolic encephalopathy only due to sepsis   -- Acute toxic encephalopathy only due to alcohol withdrawal   -- Other explanation, (please specify the other explanation)   -- Unable to determine      Query created by: Marianna Self on 2/27/2023 3:48 PM    RESPONSE TEXT:    Acute metabolic and toxic encephalopathy due to sepsis and alcohol withdrawal          Electronically signed by:  NAE LEONARD MD 2/27/2023 6:37 PM

## 2023-02-28 NOTE — PROGRESS NOTES
[facility] Medicine Daily Progress Note    Date of Service  2/27/2023    Chief Complaint  Mena Campos is a 72 y.o. female with GERD, MDD, EtOH use DO, and Left Renal / RP mass followed by oncologist Dr. Campos admitted 2/23/2023 with Right psoas abscess    Hospital Course  No notes on file    Interval Problem Update    2/24: She is encephalopathic and tremulous. Initial CIWA 10. Discussed with her , who affirmed that she drinks multiple beers daily to cope with pain and most recently would have been Wednesday. He reports that she has been declining for the past few weeks and they are changing PCPs due to perceived ambivalence about her condition. She underwent Right psoas drain placement today with IR.    2/25:  RADHA drain placed with >130 cc of purulent output. CIWA 10-4-4-4-12 receiving lorazepam x2. She is appropriately conversant today. She is visibly aspirating with thin liquids for which SLP saw her and recommended barium swallow. She reports drinking EtOH for pain and that it wasn't much alcohol she drank on Wednesday. She has been depressed because of pain. Her pain is in her sides and abdomen. She denies F/C, N/V, bowel/bladder dysfunction, bleeding.    2/26: RADHA drain ~40 cc of purulent output. CIWA 3-5-5-4-3. She is very thirsty and wants to eat/drink. Re-evaluated by SLP and cleared for regular/thins. She reports slight flank pain at the Right psoas abscess. She is constipated and felt a lump from her bottom while straining to have a BM. Denies F/C, N/V, bladder dysfunction. Abscess culture growing Citrobacter koseri with pan-susceptibility profile. Will consult ID tomorrow as culture matures for OPAT planning.    2/27: RADHA drain ~20 cc per discussion with RN. He will flush it today. CIWA 1-0-0-0-1. She developed suprapubic discomfort and urinary retention which resolved with haq catheter. She had loose Bms after taking stool softeners and laxative yesterday. She denies pain, N/V, F/C,  bleeding. POC discussed with ID Dr. Pop, will repeat CT tomorrow due to low RADHA output. POC, updates, and questions discussed with her , Nickolas.    I have discussed this patient's plan of care and discharge plan at IDT rounds today with Case Management, Nursing, Nursing leadership, and other members of the IDT team.    Consultants/Specialty  general surgery and interventional radiology    Code Status  Full Code    Disposition  Patient is not medically cleared for discharge.   Anticipate discharge to to home with organized home healthcare and close outpatient follow-up.  I have placed the appropriate orders for post-discharge needs.    Review of Systems  Review of Systems   Constitutional:  Positive for malaise/fatigue. Negative for chills and fever.   HENT:  Negative for ear pain, nosebleeds, sinus pain and sore throat.    Eyes:  Negative for pain.   Respiratory:  Negative for hemoptysis and shortness of breath.    Cardiovascular:  Negative for chest pain and leg swelling.   Gastrointestinal:  Positive for abdominal pain and constipation. Negative for blood in stool, diarrhea, melena, nausea and vomiting.   Genitourinary:  Positive for flank pain. Negative for dysuria and hematuria.   Musculoskeletal:  Negative for back pain, joint pain, myalgias and neck pain.   Neurological:  Negative for headaches.   Endo/Heme/Allergies:  Does not bruise/bleed easily.   Psychiatric/Behavioral:  Positive for depression. The patient is not nervous/anxious.       Physical Exam  Temp:  [36.3 °C (97.3 °F)-36.5 °C (97.7 °F)] 36.5 °C (97.7 °F)  Pulse:  [79-87] 86  Resp:  [16] 16  BP: (134-149)/(72-80) 137/80  SpO2:  [95 %-98 %] 95 %    Physical Exam  Vitals and nursing note reviewed. Exam conducted with a chaperone present (Primary RN Agapito at bedside).   Constitutional:       General: She is not in acute distress.     Appearance: She is ill-appearing (Chronically). She is not toxic-appearing or diaphoretic.   HENT:      Head:       Comments: Bitemporal wasting     Nose: Nose normal.      Mouth/Throat:      Mouth: Mucous membranes are moist.   Eyes:      General: No scleral icterus.     Conjunctiva/sclera: Conjunctivae normal.   Cardiovascular:      Rate and Rhythm: Normal rate and regular rhythm.      Pulses: Normal pulses.      Heart sounds: Normal heart sounds. No murmur heard.    No friction rub. No gallop.   Pulmonary:      Effort: Pulmonary effort is normal. No respiratory distress.      Breath sounds: Normal breath sounds. No wheezing, rhonchi or rales.   Abdominal:      General: Abdomen is flat. Bowel sounds are normal. There is no distension.      Palpations: Abdomen is soft.      Tenderness: There is no abdominal tenderness. There is right CVA tenderness (Mild at psoas drain site). There is no guarding or rebound.      Comments: Right RADHA drain output purulent   Genitourinary:     Comments: +Ferrera, urine yellow / clear  Musculoskeletal:      Cervical back: Neck supple.      Right lower leg: No edema.      Left lower leg: No edema.   Skin:     General: Skin is warm.   Neurological:      Mental Status: She is alert.      Motor: No tremor.   Psychiatric:         Attention and Perception: Attention and perception normal.         Mood and Affect: Mood normal. Affect is blunt.         Speech: Speech normal.         Behavior: Behavior normal. Behavior is cooperative.       Fluids    Intake/Output Summary (Last 24 hours) at 2/27/2023 1704  Last data filed at 2/26/2023 2104  Gross per 24 hour   Intake --   Output 1050 ml   Net -1050 ml         Laboratory  Recent Labs     02/25/23 0933 02/26/23 0413 02/27/23  0035   WBC 13.7* 12.2* 11.7*   RBC 3.52* 4.02* 3.66*   HEMOGLOBIN 9.1* 10.2* 9.4*   HEMATOCRIT 28.1* 32.3* 28.9*   MCV 79.8* 80.3* 79.0*   MCH 25.9* 25.4* 25.7*   MCHC 32.4* 31.6* 32.5*   RDW 51.4* 52.4* 50.8*   PLATELETCT 384 383 352   MPV 10.5 10.8 10.9       Recent Labs     02/25/23  0933 02/26/23  0413 02/27/23  0035   SODIUM 138  144 134*   POTASSIUM 3.3* 3.2* 3.5*   CHLORIDE 107 111 104   CO2 18* 20 21   GLUCOSE 115* 96 113*   BUN 14 15 14   CREATININE 0.88 0.86 0.69   CALCIUM 8.9 9.0 8.6                       Imaging  CT-DRAIN-RETROPERITONEAL   Final Result      1.  CT guided placement of a percutaneous drainage catheter in a right psoas fluid collection.   2.  The current plan is to obtain a follow-up CT scan in 5-7 days.      US-EXTREMITY VENOUS LOWER BILAT   Final Result      OUTSIDE IMAGES-CT ABDOMEN /PELVIS   Final Result      BY-ZHAIFYG-0 VIEW   Final Result      Loops of bowel and colon are somewhat indistinct, possibly related to technique. Appearance appears overall somewhat similar to outside facility CT abdomen pelvis from 2/23/2023. If symptoms have changed from recent CT, CT evaluation is recommended.             Assessment/Plan  * Psoas abscess (HCC)- (present on admission)  Assessment & Plan  Presented to [facility] [place] flank pain  CT demonstrated psoas abscess prompting transfer to [facility] for IR  General surgery consulted and s/o, recommended IR-guided drainage  IR-guided drainage 2/24/23, Repeat CT 2/28/23  RADHA output < 50 cc, requested RN flushes BID and record output  Abscess culture +Citrobacter koseri, pan-susceptible  Continue zosyn for now  ID consulted for antibiotic planning after source control  HIV negative    Urinary retention  Assessment & Plan  Likely due to oxybutynin - discontinued  No UA from prior to haq placement  Zosyn expected to cover most urine pathogens - culture deferred  Trial haq discontinuation prior to discharge, if unsuccessful will refer to urology    External hemorrhoids  Assessment & Plan  Bowel protocol - senna QHS PRN, miralax PRN    Other dysphagia- (present on admission)  Assessment & Plan  Resolved  SLP evaluation  Modified barium swallow deferred per SLP recommendation    Retroperitoneal mass- (present on admission)  Assessment & Plan  Underwent biopsy of Left renal pole mass  extending into RP, it was not indicative of malignancy  Follow up with Oncologist Dr. Campos for further diagnostic evaluation after discharge    Hypokalemia- (present on admission)  Assessment & Plan  Resolved  Likely due to inadequate PO intake from EtOH use DO  Mg WNL  Kdur 40 daily  Repeat AM BMP    Hyponatremia- (present on admission)  Assessment & Plan  Mild, recurrent  Likely due to poor PO intake from EtOH use DO  Repeat AM BMP    Microcytic anemia- (present on admission)  Assessment & Plan  Follows with Heme-Onc Dr. Campos  Elevated ferritin indicative of AOCD  Transfuse for Hgb <7    Alcohol withdrawal syndrome, with delirium (HCC)- (present on admission)  Assessment & Plan  Resolved  Reports >2 beers daily, most recent 2/22/23  Developed diaphoresis, delirium, tremor, tachycardia on HD#2  CIWA-Lorazepam protocol  Empiric MVN + B12 + Folate + Thiamine  Will discuss MAT / AA prior to discharge    Leg swelling- (present on admission)  Assessment & Plan  BLE US negative for DVT  TTE deferred    Acute encephalopathy- (present on admission)  Assessment & Plan  Resolved  Likely multifactorial including underlying abscess and EtOH withdrawal - see separate plans  Nonfocal, CTH deferred    Sepsis with encephalopathy without septic shock (HCC)- (present on admission)  Assessment & Plan  This is Sepsis Present on admission  SIRS criteria identified on my evaluation include: Fever, with temperature greater than 101 deg F, Tachypnea, with respirations greater than 20 per minute and Leukocytosis, with WBC greater than 12,000  Source is right psoas abscess  Sepsis protocol initiated  Fluid resuscitation ordered per protocol  Crystalloid Fluid Administration: Fluid resuscitation ordered per standard protocol - 30 mL/kg per current or ideal body weight  IV antibiotics as appropriate for source of sepsis  Reassessment: I have reassessed the patient's hemodynamic status    BCx 2/23: NGTD  Abscess Cx 2/24: +Citrobacter  rahat  Continue zosyn for intra-abdominal abscess  Leukocytosis improving  ID consulted for antibiotic planning after source control      Gastroesophageal reflux disease with esophagitis- (present on admission)  Assessment & Plan  Likely exacerbated by EtOH use DO  Continue omeprazole    OAB (overactive bladder)- (present on admission)  Assessment & Plan  Discontinued oxybutynin due to urinary retention    Moderate episode of recurrent major depressive disorder (HCC)- (present on admission)  Assessment & Plan  Continue sertraline       VTE prophylaxis: SCDs/TEDs and pharmacologic prophylaxis contraindicated due to psoas abscess drain 2/24    I have performed a physical exam and reviewed and updated ROS and Plan today (2/27/2023). In review of yesterday's note (2/26/2023), there are no changes except as documented above.

## 2023-02-28 NOTE — PROGRESS NOTES
Infectious Disease Progress Note    Author: Leela Pop M.D. Date & Time of service: 2023  11:20 AM    Chief Complaint:  Psoas abscess    Interval History:   72 y.o. female admitted 2023 as a transfer for worsening right-sided flank pain due to mass    AF abd pain controlled with oxy.  Mult fecal incontinence episodes yesterday-none today    Labs Reviewed and Medications Reviewed.    Review of Systems:  Review of Systems   Constitutional:  Positive for malaise/fatigue. Negative for fever.   Neurological:  Positive for tremors.     Hemodynamics:  Temp (24hrs), Av.5 °C (97.7 °F), Min:36.3 °C (97.4 °F), Max:36.7 °C (98 °F)  Temperature: 36.4 °C (97.6 °F)  Pulse  Av.7  Min: 67  Max: 112   Blood Pressure : 132/78       Physical Exam:  Physical Exam  Vitals and nursing note reviewed.   Constitutional:       Appearance: She is ill-appearing. She is not toxic-appearing or diaphoretic.      Comments: cachectic   Eyes:      General: No scleral icterus.  Cardiovascular:      Rate and Rhythm: Normal rate.   Pulmonary:      Effort: Pulmonary effort is normal. No respiratory distress.      Breath sounds: No stridor.   Abdominal:      General: There is no distension.      Comments: Drain purulent   Musculoskeletal:      Comments: sarcopenia   Skin:     Coloration: Skin is pale. Skin is not jaundiced.   Neurological:      Mental Status: She is alert.       Meds:    Current Facility-Administered Medications:     magnesium oxide    phosphorus    potassium chloride SA    sennosides    LORazepam **OR** LORazepam    QUEtiapine    melatonin    LORazepam **OR** LORazepam    LORazepam **OR** LORazepam    LORazepam **OR** LORazepam    LORazepam **OR** LORazepam    [COMPLETED] detox 1000 mL infusion **AND** thiamine **AND** multivitamin **AND** folic acid    omeprazole    [DISCONTINUED] senna-docusate **AND** polyethylene glycol/lytes **AND** magnesium hydroxide **AND** bisacodyl    acetaminophen    Notify  provider if pain remains uncontrolled **AND** Use the Numeric Rating Scale (NRS), Flores-Baker Faces (WBF), or FLACC on regular floors and Critical-Care Pain Observation Tool (CPOT) on ICUs/Trauma to assess pain **AND** Pulse Ox **AND** Pharmacy Consult Request **AND** If patient difficult to arouse and/or has respiratory depression (respiratory rate of 10 or less), stop any opiates that are currently infusing and call a Rapid Response.    oxyCODONE immediate-release **OR** oxyCODONE immediate-release **OR** morphine injection    ondansetron    ondansetron    sertraline    [COMPLETED] piperacillin-tazobactam **AND** piperacillin-tazobactam    Labs:  Recent Labs     02/26/23 0413 02/27/23 0035   WBC 12.2* 11.7*   RBC 4.02* 3.66*   HEMOGLOBIN 10.2* 9.4*   HEMATOCRIT 32.3* 28.9*   MCV 80.3* 79.0*   MCH 25.4* 25.7*   RDW 52.4* 50.8*   PLATELETCT 383 352   MPV 10.8 10.9   NEUTSPOLYS 78.20* 79.40*   LYMPHOCYTES 13.70* 13.80*   MONOCYTES 5.30 4.60   EOSINOPHILS 1.10 1.10   BASOPHILS 0.70 0.50     Recent Labs     02/26/23 0413 02/27/23 0035   SODIUM 144 134*   POTASSIUM 3.2* 3.5*   CHLORIDE 111 104   CO2 20 21   GLUCOSE 96 113*   BUN 15 14     Recent Labs     02/26/23 0413 02/27/23 0035   CREATININE 0.86 0.69       Imaging:  CT-ABDOMEN-PELVIS WITH    Result Date: 2/28/2023 2/28/2023 9:33 AM HISTORY/REASON FOR EXAM:  Sepsis; Known psoas abscess s/p drain. TECHNIQUE/EXAM DESCRIPTION:   CT scan of the abdomen and pelvis with contrast. Contrast-enhanced helical scanning was obtained from the diaphragmatic domes through the pubic symphysis following the bolus administration of nonionic contrast without complication. 100 mL of Omnipaque 350 nonionic contrast was administered without complication. Low dose optimization technique was utilized for this CT exam including automated exposure control and adjustment of the mA and/or kV according to patient size. COMPARISON: CT abdomen and pelvis from outside facility 2/23/2023  FINDINGS: Lower Chest: Trace pleural effusions and small pericardial effusion. Liver: Normal. Spleen: Enlarged measuring 15 cm craniocaudal. Pancreas: Unremarkable. Gallbladder: Cholelithiasis. Biliary: Nondilated. Adrenal glands: Normal. Kidneys: There are small renal cysts seen. There are several tiny subcentimeter hypodense lesions which are too small to accurately characterize, likely additional tiny cysts. There are nonobstructing left renal stones. No significant hydronephrosis. Bowel: Suboptimal evaluation for colonic wall thickening due to areas of bowel decompression. There could be some sigmoid and additional scattered colonic wall thickening. Cannot exclude colitis. No small bowel obstruction. There is scattered colonic diverticulosis. Lymph nodes: No adenopathy. Vasculature: Scattered atherosclerosis. Peritoneum: Diffuse infiltration of the mesenteric fat, edema versus inflammation. Musculoskeletal: There is marked interval decrease in size of previously seen large multiloculated right psoas, posterior pararenal and posterior subcutaneous flank abscess. There is a percutaneous pigtail drainage catheter in place. There is residual abscess in the subcutaneous tissues of the right flank measuring about 3.9 x 4.7 cm.. Right femoral ORIF. Old posterior left rib fractures Pelvis: Bladder is decompressed by Ferrera catheter.     1.  Marked interval decrease in size of large right psoas, posterior pararenal and subcutaneous right flank abscess. There is percutaneous pigtail drainage catheter in place. There is residual abscess in the subcutaneous tissues measuring about 3.9 x 4.7  cm. 2.  Trace pleural effusions. 3.  Splenomegaly. 4.  Nonobstructing left renal stones. 5.  Limited evaluation for bowel wall thickening due to decompression. There could be sigmoid colon wall thickening and some scattered fluid within the colon. Correlate for colitis. No bowel obstruction. 6.  Mesenteric and body wall  edema.    CT-DRAIN-RETROPERITONEAL    Result Date: 2/24/2023 2/24/2023 4:17 PM HISTORY/REASON FOR EXAM: Right psoas abscess TECHNIQUE/EXAM DESCRIPTION: Right retroperitoneal abscess drainage with CT guidance. Low dose optimization technique was utilized for this CT exam including automated exposure control and adjustment of the mA and/or kV according to patient size. PROCEDURE: Informed consent was obtained from the patient's  via telephone consent. Moderate sedation with Fentanyl and Versed was administered during the procedure with appropriate continuous patient monitoring by the radiology nurse. Intraservice duration:  20 minutes Localizing CT images were obtained with the patient in prone position. The skin was prepped with ChloraPrep and draped in a sterile fashion. Following local anesthesia with 1% Lidocaine, a 17 G guiding needle was placed and needle path confirmed with CT. An Amplatz guidewire was placed and following serial tract dilatation, a 12 Uzbek pigtail locking catheter was placed. A specimen was collected and submitted for culture and sensitivity and Gram stain. Total of 125 mL purulent fluid was drained. The catheter was secured to the skin and connected to suction bulb drainage. Final CT images were obtained documenting catheter position. The patient tolerated the procedure well with no evidence of complication. COMPARISON: None available. FINDINGS: The final CT images show satisfactory catheter position within the target collection.     1.  CT guided placement of a percutaneous drainage catheter in a right psoas fluid collection. 2.  The current plan is to obtain a follow-up CT scan in 5-7 days.    [id number] VIEW    Result Date: 2/24/2023 2/24/2023 12:58 AM HISTORY/REASON FOR EXAM:  Abdominal Pain. TECHNIQUE/EXAM DESCRIPTION AND NUMBER OF VIEWS:   1 views of the abdomen. COMPARISON: Outside facility CT abdomen pelvis FINDINGS: Loops of bowel and colon are indistinct. No free  intraperitoneal air is identified though evaluation is limited on supine imaging.  No pathologic calcification is noted. Osteopenia. Right femoral hardware.     Loops of bowel and colon are somewhat indistinct, possibly related to technique. Appearance appears overall somewhat similar to outside facility CT abdomen pelvis from 2023. If symptoms have changed from recent CT, CT evaluation is recommended.    US-EXTREMITY VENOUS LOWER BILAT    Result Date: 2023   Vascular Laboratory  CONCLUSIONS  No evidence of deep venous thrombosis bilaterally.  GENEVA, AUBREY  Exam Date:     2023 12:21  Room #:     Inpatient  Priority:     Routine  Ht (in):     65      Wt (lb):     100  Ordering Physician:        JIMI VILLA  Referring Physician:       DAISY Chen  Sonographer:               Reed Navarro RDCS, RVT  Study Type:                Complete Bilateral  Technical Quality:         Adequate  Age:    72    Gender:     F  MRN:    [id number]  :    1950      BSA:    1.47  Indications:     Swelling of Limb  CPT Codes:       80882  ICD Codes:       729.81  History:         Bilateral lower extremity swelling  Limitations:  PROCEDURES:  Bilateral lower extremity venous duplex imaging.  The following venous structures were evaluated: common femoral, deep  femoral, proximal great saphenous, femoral, popliteal  veins.  Serial compression, color, and spectral Doppler flow evaluations were  performed.  FINDINGS:  Bilateral lower extremities.  No evidence of deep venous thrombosis bilaterally.  Jacobo Zeng  (Electronically Signed)  Final Date:      2023                   14:46    CT ABDOMEN-PELVIS WITH IV CONTRAST    Result Date: 2023  HISTORY/REASON FOR EXAM:  renal mass TECHNIQUE/EXAM DESCRIPTION: CT scan of the abdomen and pelvis with contrast.  Both automated exposure control and Automated adjustment of tube current based on patient size are utilized.   2/23/2023 6:20 PM. Total DLP: 255 mGy*cm COMPARISON: 1/10/2023 and 2/6/2023 FINDINGS: Lung bases: Atelectasis versus scar is now present within both lung bases. The masslike consolidation in the left base is markedly improved. Solid abdominal organs: There is marked interval change of the complex right renal lesion as compared to the previous. The biopsy images dated 2/6/2023 demonstrated significant progression of the mass versus interval hemorrhage. On today's study, the large majority of the mass appears cystic with multifocal septations but there is significant direct invasion of the adjacent soft tissues with the tumor now abutting the skin surface. The maximal dimension of the lesion on today's study is approximately  11.4 cm x 5.9 cm on the axial imaging. Mass is displacing the kidney anteriorly. Is impossible to define whether the lesion originates from the kidney or the right adrenal gland. On the coronal images, this lesion measures approximately 14.3 cm and tracks along the psoas muscle. Given the marked interval change from the 1/10/2023 study 22/6/2023 study and a history of no interval procedure, tumoral hemorrhage at that time would be favored. Progressive hemorrhage was secondary abscess formation be a  strong consideration. Neoplasm remains a strong consideration. The abnormal soft tissue noted lateral to the left kidney abutting the hemidiaphragm on the prior examination has completely resolved which suggest a contralateral hemorrhage and interval resolution. Focal renal cystic lesions within the cortex appear benign. Small bilateral renal clipping system stones are again noted. Bowel, pelvis and osseous structures: Remainder the study is essentially stable produce extensive large bowel stool current exam suggesting fecal impaction. Osseous structures are moderately degenerative.     1.  Left-sided lung base and perirenal soft tissue mass of nearly completely resolved. This may suggest a previous  focal hemorrhage and left basilar atelectasis or infection but in any case, a benign etiology is suggested. 2.  There is extensive progression of the mixed cystic and solid lesion involving the retroperitoneum on the right. This mass now extends to near the skin surface with a maximal dimension of 11.4 cm x 14.4 cm. Given a larger cystic component extending along the psoas muscle and the previous findings from the biopsy, tumoral hemorrhage between the January 10 study and February 6 study is favored with progressive hemorrhage from February 6 to today. Blood products may now have become infected given the large cystic components on the current exam. Underlying tumor as indicated on the January 10 study remains strong possibility.   CT-FNA BIOPSY W/ CT GUIDANCE    Result Date: 2/6/2023  HISTORY/REASON FOR EXAM:  retroperitoneal mass. TECHNIQUE/EXAM DESCRIPTION: CT guided needle placement.  CT guided right renal mass/retroperitoneal mass biopsy.  FINDINGS: Large heterogeneous mass arising from the right kidney and right psoas muscle. Interval progression from prior examination     Technically successful CT-guided biopsy of right renal mass/right retroperitoneal mass Nickolas Baker MD      Micro:  Results       Procedure Component Value Units Date/Time    CULTURE WOUND W/ GRAM STAIN [[id number]]  (Abnormal)  (Susceptibility) Collected: 02/24/23 1700    Order Status: Completed Specimen: Wound Updated: 02/27/23 1338     Significant Indicator POS     Source WND     Site Right Psoas     Culture Result -     Gram Stain Result Moderate WBCs.  No organisms seen.       Culture Result Citrobacter koseri  Moderate growth      Susceptibility       Citrobacter koseri (1)       Antibiotic Interpretation Microscan   Method Status    Ampicillin Resistant >16 mcg/mL ROBERTH Final    Ceftriaxone Sensitive <=1 mcg/mL ROBERTH Final    Cefazolin Sensitive <=2 mcg/mL ROBERTH Final    Ciprofloxacin Sensitive <=0.25 mcg/mL ROBERTH Final    Cefepime Sensitive  <=2 mcg/mL ROBERTH Final    Cefuroxime Sensitive <=4 mcg/mL ROBERTH Final    Ampicillin/sulbactam Sensitive <=4/2 mcg/mL ROBERTH Final    Ertapenem Sensitive <=0.5 mcg/mL ROBERTH Final    Tobramycin Sensitive <=2 mcg/mL ROBERTH Final    Gentamicin Sensitive <=2 mcg/mL ROBERTH Final    Minocycline Sensitive <=4 mcg/mL ROBERTH Final    Moxifloxacin Sensitive <=2 mcg/mL ROBERTH Final    Pip/Tazobactam Sensitive <=8 mcg/mL ROBERTH Final    Trimeth/Sulfa Sensitive <=0.5/9.5 mcg/mL ROBETRH Final    Tigecycline Sensitive <=2 mcg/mL ROBERTH Final                       Anaerobic Culture [[id number]] Collected: 02/24/23 1700    Order Status: Completed Specimen: Wound Updated: 02/27/23 1338     Significant Indicator NEG     Source WND     Site Right Psoas     Culture Result No Anaerobes isolated.    GRAM STAIN [[id number]] Collected: 02/24/23 1700    Order Status: Completed Specimen: Wound Updated: 02/25/23 0528     Significant Indicator .     Source WND     Site Right Psoas     Gram Stain Result Moderate WBCs.  No organisms seen.      FLUID CULTURE W/GRAM STAIN [[id number]]     Order Status: No result Specimen: Body Fluid from Peritoneal Fluid     Aerobic/Anaerobic Culture (Surgery) [[id number]]     Order Status: No result Specimen: Other from Peritoneal Fluid     URINALYSIS [[id number]]     Order Status: No result Specimen: Urine             Assessment:  Active Hospital Problems    Diagnosis     *Psoas abscess (HCC) [K68.12]     Urinary retention [R33.9]     External hemorrhoids [K64.4]     Other dysphagia [R13.19]     Alcohol withdrawal syndrome, with delirium (HCC) [F10.931]     Retroperitoneal mass [R19.00]     Sepsis with encephalopathy without septic shock (HCC) [A41.9, R65.20, G93.40]     Acute encephalopathy [G93.40]     Leg swelling [M79.89]     Gastroesophageal reflux disease with esophagitis [K21.00]     OAB (overactive bladder) [N32.81]     Moderate episode of recurrent major depressive disorder (HCC) [F33.1]    Psoas abscess  Urinary retention  Leukocytosis    EtOH withdrawal-CIWA        PLAN:   S/p IR drainage 2/24-multiloculated so unclear if source control  Plan for repeat CT this work to assess adequacy of drainage  Further work up of malignancy per PCT  Final antibiotic recommendations per CT results and clinical course     Midline-likely no. + oral options

## 2023-02-28 NOTE — PROGRESS NOTES
Assumed pt care at 0700 . Pt is A&Ox 4 . Pt denies pain at this time.  Pt's belongings are nearby, bed is in the lowest position and locked.

## 2023-02-28 NOTE — CARE PLAN
The patient is Stable - Low risk of patient condition declining or worsening    Shift Goals  Clinical Goals: CT, pain management  Patient Goals: comfort  Family Goals: n/a    Progress made toward(s) clinical / shift goals:    Problem: Knowledge Deficit - Standard  Goal: Patient and family/care givers will demonstrate understanding of plan of care, disease process/condition, diagnostic tests and medications  Outcome: Progressing     Problem: Hemodynamics  Goal: Patient's hemodynamics, fluid balance and neurologic status will be stable or improve  Outcome: Progressing     Problem: Fluid Volume  Goal: Fluid volume balance will be maintained  Outcome: Progressing       Patient is not progressing towards the following goals:

## 2023-02-28 NOTE — PROGRESS NOTES
Hospital Medicine Daily Progress Note    Date of Service  2/28/2023    Chief Complaint  Mena Campos is a 72 y.o. female with GERD, MDD, EtOH use DO, and Left Renal / RP mass followed by oncologist Dr. Campos admitted 2/23/2023 with Right psoas abscess    Hospital Course  No notes on file    Repeat CT on 2/28 showed increase in size of psoas abscess measuring 3.9 x 4.7 cm with pigtail drainage catheter in place, trace blood fusions, splenomegaly, nonobstructive left renal stones, is mesenteric and body wall edema.    Interval Problem Update  Patient was seen and examined at bedside.  I have personally reviewed and interpreted vitals, labs, and imaging.    2/28.  Afebrile.  Slightly hypertensive.  On 0-1 L nasal cannula.  Leukocytosis trending down to 11.7.  Repeat potassium, magnesium, phosphorus.  Denies fever, chills, chest pains, shortness of breath.  Has been having abdominal cramping and loose bowel movements.  Screen for C. difficile.  Continue Zosyn for now.  De-escalated back to clear liquid diet as patient having severe abdominal pain.    I have discussed this patient's plan of care and discharge plan at IDT rounds today with Case Management, Nursing, Nursing leadership, and other members of the IDT team.    Consultants/Specialty  general surgery and interventional radiology    Code Status  Full Code    Disposition  Patient is not medically cleared for discharge.   Anticipate discharge to to home with organized home healthcare and close outpatient follow-up.  I have placed the appropriate orders for post-discharge needs.    Review of Systems  Review of Systems   Constitutional:  Positive for malaise/fatigue. Negative for chills and fever.   HENT:  Negative for ear pain, nosebleeds, sinus pain and sore throat.    Eyes:  Negative for pain.   Respiratory:  Negative for hemoptysis and shortness of breath.    Cardiovascular:  Negative for chest pain and leg swelling.   Gastrointestinal:  Positive for  abdominal pain and constipation. Negative for blood in stool, diarrhea, melena, nausea and vomiting.   Genitourinary:  Positive for flank pain. Negative for dysuria and hematuria.   Musculoskeletal:  Negative for back pain, joint pain, myalgias and neck pain.   Neurological:  Negative for headaches.   Endo/Heme/Allergies:  Does not bruise/bleed easily.   Psychiatric/Behavioral:  Positive for depression. The patient is not nervous/anxious.       Physical Exam  Temp:  [36.3 °C (97.4 °F)-36.7 °C (98 °F)] 36.4 °C (97.6 °F)  Pulse:  [67-88] 67  Resp:  [16-19] 16  BP: (132-150)/(74-99) 132/78  SpO2:  [95 %-98 %] 96 %    Physical Exam  Vitals and nursing note reviewed. Exam conducted with a chaperone present (Primary RN Agapito at bedside).   Constitutional:       General: She is not in acute distress.     Appearance: She is ill-appearing (Chronically). She is not toxic-appearing or diaphoretic.   HENT:      Head:      Comments: Bitemporal wasting     Nose: Nose normal.      Mouth/Throat:      Mouth: Mucous membranes are moist.   Eyes:      General: No scleral icterus.     Conjunctiva/sclera: Conjunctivae normal.   Cardiovascular:      Rate and Rhythm: Normal rate and regular rhythm.      Pulses: Normal pulses.      Heart sounds: Normal heart sounds. No murmur heard.    No friction rub. No gallop.   Pulmonary:      Effort: Pulmonary effort is normal. No respiratory distress.      Breath sounds: Normal breath sounds. No wheezing, rhonchi or rales.   Abdominal:      General: Abdomen is flat. Bowel sounds are normal. There is no distension.      Palpations: Abdomen is soft.      Tenderness: There is no abdominal tenderness. There is right CVA tenderness (Mild at psoas drain site). There is no guarding or rebound.      Comments: Right RADHA drain output purulent   Genitourinary:     Comments: +Ferrera, urine yellow / clear  Musculoskeletal:      Cervical back: Neck supple.      Right lower leg: No edema.      Left lower leg: No  edema.   Skin:     General: Skin is warm.   Neurological:      Mental Status: She is alert.      Motor: No tremor.   Psychiatric:         Attention and Perception: Attention and perception normal.         Mood and Affect: Mood normal. Affect is blunt.         Speech: Speech normal.         Behavior: Behavior normal. Behavior is cooperative.       Fluids    Intake/Output Summary (Last 24 hours) at 2/28/2023 0915  Last data filed at 2/27/2023 1809  Gross per 24 hour   Intake --   Output 1320 ml   Net -1320 ml         Laboratory  Recent Labs     02/25/23 0933 02/26/23 0413 02/27/23  0035   WBC 13.7* 12.2* 11.7*   RBC 3.52* 4.02* 3.66*   HEMOGLOBIN 9.1* 10.2* 9.4*   HEMATOCRIT 28.1* 32.3* 28.9*   MCV 79.8* 80.3* 79.0*   MCH 25.9* 25.4* 25.7*   MCHC 32.4* 31.6* 32.5*   RDW 51.4* 52.4* 50.8*   PLATELETCT 384 383 352   MPV 10.5 10.8 10.9       Recent Labs     02/25/23 0933 02/26/23 0413 02/27/23  0035   SODIUM 138 144 134*   POTASSIUM 3.3* 3.2* 3.5*   CHLORIDE 107 111 104   CO2 18* 20 21   GLUCOSE 115* 96 113*   BUN 14 15 14   CREATININE 0.88 0.86 0.69   CALCIUM 8.9 9.0 8.6                       Imaging  CT-DRAIN-RETROPERITONEAL   Final Result      1.  CT guided placement of a percutaneous drainage catheter in a right psoas fluid collection.   2.  The current plan is to obtain a follow-up CT scan in 5-7 days.      US-EXTREMITY VENOUS LOWER BILAT   Final Result      OUTSIDE IMAGES-CT ABDOMEN /PELVIS   Final Result      FY-EXGJHER-8 VIEW   Final Result      Loops of bowel and colon are somewhat indistinct, possibly related to technique. Appearance appears overall somewhat similar to outside facility CT abdomen pelvis from 2/23/2023. If symptoms have changed from recent CT, CT evaluation is recommended.      CT-ABDOMEN-PELVIS WITH    (Results Pending)          Assessment/Plan  * Psoas abscess (HCC)- (present on admission)  Assessment & Plan  [place] [facility] [place]  CT demonstrated psoas abscess prompting  transfer to [facility] IR  General surgery consulted and s/o, recommended IR-guided drainage  IR-guided drainage 2/24/23, Repeat CT 2/28/23  RADHA output < 50 cc, requested RN flushes BID and record output  Abscess culture +Citrobacter koseri, pan-susceptible  Continue zosyn for now  ID consulted for antibiotic planning after source control  HIV negative    Urinary retention  Assessment & Plan  Likely due to oxybutynin - discontinued  No UA from prior to haq placement  Zosyn expected to cover most urine pathogens - culture deferred  Trial haq discontinuation prior to discharge, if unsuccessful will refer to urology    External hemorrhoids  Assessment & Plan  Bowel protocol - senna QHS PRN, miralax PRN    Other dysphagia- (present on admission)  Assessment & Plan  Resolved  SLP evaluation  Modified barium swallow deferred per SLP recommendation    Retroperitoneal mass- (present on admission)  Assessment & Plan  Underwent biopsy of Left renal pole mass extending into RP, it was not indicative of malignancy  Follow up with Oncologist Dr. Campos for further diagnostic evaluation after discharge    Hypokalemia- (present on admission)  Assessment & Plan  Resolved  Likely due to inadequate PO intake from EtOH use DO  Mg WNL  Kdur 40 daily  Repeat AM BMP    Hyponatremia- (present on admission)  Assessment & Plan  Mild, recurrent  Likely due to poor PO intake from EtOH use DO  Repeat AM BMP    Microcytic anemia- (present on admission)  Assessment & Plan  Follows with Heme-Onc Dr. Campos  Elevated ferritin indicative of AOCD  Transfuse for Hgb <7    Alcohol withdrawal syndrome, with delirium (HCC)- (present on admission)  Assessment & Plan  Resolved  Reports >2 beers daily, most recent 2/22/23  Developed diaphoresis, delirium, tremor, tachycardia on HD#2  CIWA-Lorazepam protocol  Empiric MVN + B12 + Folate + Thiamine  Will discuss MAT / AA prior to discharge    Leg swelling- (present on admission)  Assessment & Plan  BLE US  negative for DVT  TTE deferred    Acute encephalopathy- (present on admission)  Assessment & Plan  Resolved  Likely multifactorial including underlying abscess and EtOH withdrawal - see separate plans  Nonfocal, CTH deferred    Sepsis with encephalopathy without septic shock (HCC)- (present on admission)  Assessment & Plan  This is Sepsis Present on admission  SIRS criteria identified on my evaluation include: Fever, with temperature greater than 101 deg F, Tachypnea, with respirations greater than 20 per minute and Leukocytosis, with WBC greater than 12,000  Source is right psoas abscess  Sepsis protocol initiated  Fluid resuscitation ordered per protocol  Crystalloid Fluid Administration: Fluid resuscitation ordered per standard protocol - 30 mL/kg per current or ideal body weight  IV antibiotics as appropriate for source of sepsis  Reassessment: I have reassessed the patient's hemodynamic status    BCx 2/23: NGTD  Abscess Cx 2/24: +Citrobacter koseri  Continue zosyn for intra-abdominal abscess  Leukocytosis improving  ID consulted for antibiotic planning after source control    Gastroesophageal reflux disease with esophagitis- (present on admission)  Assessment & Plan  Likely exacerbated by EtOH use DO  Continue omeprazole    OAB (overactive bladder)- (present on admission)  Assessment & Plan  Discontinued oxybutynin due to urinary retention    Moderate episode of recurrent major depressive disorder (HCC)- (present on admission)  Assessment & Plan  Continue sertraline       VTE prophylaxis: SCDs/TEDs and pharmacologic prophylaxis contraindicated due to psoas abscess drain 2/24    I have performed a physical exam and reviewed and updated ROS and Plan today (2/28/2023). In review of yesterday's note (2/27/2023), there are no changes except as documented above.

## 2023-02-28 NOTE — ASSESSMENT & PLAN NOTE
Likely due to oxybutynin - discontinued  No UA from prior to haq placement  Zosyn expected to cover most urine pathogens - culture deferred  Trial haq discontinuation prior to discharge, if unsuccessful will refer to urology  Hold off on voiding trial for now as patient still having significant diarrhea and vulva dermatitis from moisture     Remove haq today, TOV

## 2023-02-28 NOTE — CARE PLAN
The patient is Watcher - Medium risk of patient condition declining or worsening    Shift Goals  Clinical Goals: pain control, safety  Patient Goals: rest    Progress made toward(s) clinical / shift goals:  Patient remained safe and free from falls overnight. Pain was controlled with PRN oxycodone, see MAR. Rested well.      Problem: Pain - Standard  Goal: Alleviation of pain or a reduction in pain to the patient’s comfort goal  Outcome: Progressing     Problem: Fall Risk  Goal: Patient will remain free from falls  Outcome: Progressing     Problem: Psychosocial  Goal: Patient's level of anxiety will decrease  Outcome: Progressing

## 2023-03-01 LAB
ANION GAP SERPL CALC-SCNC: 13 MMOL/L (ref 7–16)
BASOPHILS # BLD AUTO: 0.8 % (ref 0–1.8)
BASOPHILS # BLD: 0.08 K/UL (ref 0–0.12)
BUN SERPL-MCNC: 9 MG/DL (ref 8–22)
CALCIUM SERPL-MCNC: 8.5 MG/DL (ref 8.5–10.5)
CHLORIDE SERPL-SCNC: 106 MMOL/L (ref 96–112)
CO2 SERPL-SCNC: 22 MMOL/L (ref 20–33)
CREAT SERPL-MCNC: 0.78 MG/DL (ref 0.5–1.4)
EOSINOPHIL # BLD AUTO: 0.24 K/UL (ref 0–0.51)
EOSINOPHIL NFR BLD: 2.5 % (ref 0–6.9)
ERYTHROCYTE [DISTWIDTH] IN BLOOD BY AUTOMATED COUNT: 50.8 FL (ref 35.9–50)
GFR SERPLBLD CREATININE-BSD FMLA CKD-EPI: 80 ML/MIN/1.73 M 2
GLUCOSE BLD STRIP.AUTO-MCNC: 109 MG/DL (ref 65–99)
GLUCOSE SERPL-MCNC: 113 MG/DL (ref 65–99)
HCT VFR BLD AUTO: 30.5 % (ref 37–47)
HGB BLD-MCNC: 9.8 G/DL (ref 12–16)
IMM GRANULOCYTES # BLD AUTO: 0.09 K/UL (ref 0–0.11)
IMM GRANULOCYTES NFR BLD AUTO: 0.9 % (ref 0–0.9)
LYMPHOCYTES # BLD AUTO: 1.54 K/UL (ref 1–4.8)
LYMPHOCYTES NFR BLD: 15.8 % (ref 22–41)
MAGNESIUM SERPL-MCNC: 1.8 MG/DL (ref 1.5–2.5)
MCH RBC QN AUTO: 25.5 PG (ref 27–33)
MCHC RBC AUTO-ENTMCNC: 32.1 G/DL (ref 33.6–35)
MCV RBC AUTO: 79.4 FL (ref 81.4–97.8)
MONOCYTES # BLD AUTO: 0.54 K/UL (ref 0–0.85)
MONOCYTES NFR BLD AUTO: 5.6 % (ref 0–13.4)
NEUTROPHILS # BLD AUTO: 7.23 K/UL (ref 2–7.15)
NEUTROPHILS NFR BLD: 74.4 % (ref 44–72)
NRBC # BLD AUTO: 0 K/UL
NRBC BLD-RTO: 0 /100 WBC
PHOSPHATE SERPL-MCNC: 3.5 MG/DL (ref 2.5–4.5)
PLATELET # BLD AUTO: 338 K/UL (ref 164–446)
PMV BLD AUTO: 10.5 FL (ref 9–12.9)
POTASSIUM SERPL-SCNC: 2.9 MMOL/L (ref 3.6–5.5)
RBC # BLD AUTO: 3.84 M/UL (ref 4.2–5.4)
SODIUM SERPL-SCNC: 141 MMOL/L (ref 135–145)
WBC # BLD AUTO: 9.7 K/UL (ref 4.8–10.8)

## 2023-03-01 PROCEDURE — 700101 HCHG RX REV CODE 250: Performed by: INTERNAL MEDICINE

## 2023-03-01 PROCEDURE — 80048 BASIC METABOLIC PNL TOTAL CA: CPT

## 2023-03-01 PROCEDURE — 36415 COLL VENOUS BLD VENIPUNCTURE: CPT

## 2023-03-01 PROCEDURE — 82962 GLUCOSE BLOOD TEST: CPT

## 2023-03-01 PROCEDURE — 700102 HCHG RX REV CODE 250 W/ 637 OVERRIDE(OP): Performed by: HOSPITALIST

## 2023-03-01 PROCEDURE — 700111 HCHG RX REV CODE 636 W/ 250 OVERRIDE (IP): Performed by: HOSPITALIST

## 2023-03-01 PROCEDURE — 700111 HCHG RX REV CODE 636 W/ 250 OVERRIDE (IP): Performed by: INTERNAL MEDICINE

## 2023-03-01 PROCEDURE — 85025 COMPLETE CBC W/AUTO DIFF WBC: CPT

## 2023-03-01 PROCEDURE — 700102 HCHG RX REV CODE 250 W/ 637 OVERRIDE(OP): Performed by: STUDENT IN AN ORGANIZED HEALTH CARE EDUCATION/TRAINING PROGRAM

## 2023-03-01 PROCEDURE — 99233 SBSQ HOSP IP/OBS HIGH 50: CPT | Performed by: INTERNAL MEDICINE

## 2023-03-01 PROCEDURE — A9270 NON-COVERED ITEM OR SERVICE: HCPCS | Performed by: STUDENT IN AN ORGANIZED HEALTH CARE EDUCATION/TRAINING PROGRAM

## 2023-03-01 PROCEDURE — 84100 ASSAY OF PHOSPHORUS: CPT

## 2023-03-01 PROCEDURE — 700102 HCHG RX REV CODE 250 W/ 637 OVERRIDE(OP): Performed by: INTERNAL MEDICINE

## 2023-03-01 PROCEDURE — 770004 HCHG ROOM/CARE - ONCOLOGY PRIVATE *

## 2023-03-01 PROCEDURE — A9270 NON-COVERED ITEM OR SERVICE: HCPCS | Performed by: HOSPITALIST

## 2023-03-01 PROCEDURE — A9270 NON-COVERED ITEM OR SERVICE: HCPCS | Performed by: INTERNAL MEDICINE

## 2023-03-01 PROCEDURE — 700105 HCHG RX REV CODE 258: Performed by: INTERNAL MEDICINE

## 2023-03-01 PROCEDURE — 83735 ASSAY OF MAGNESIUM: CPT

## 2023-03-01 PROCEDURE — 700105 HCHG RX REV CODE 258: Performed by: HOSPITALIST

## 2023-03-01 RX ORDER — LANOLIN ALCOHOL/MO/W.PET/CERES
400 CREAM (GRAM) TOPICAL 2 TIMES DAILY
Status: COMPLETED | OUTPATIENT
Start: 2023-03-01 | End: 2023-03-01

## 2023-03-01 RX ORDER — SODIUM CHLORIDE AND POTASSIUM CHLORIDE 150; 900 MG/100ML; MG/100ML
INJECTION, SOLUTION INTRAVENOUS CONTINUOUS
Status: DISPENSED | OUTPATIENT
Start: 2023-03-01 | End: 2023-03-04

## 2023-03-01 RX ADMIN — POTASSIUM BICARBONATE 25 MEQ: 978 TABLET, EFFERVESCENT ORAL at 08:25

## 2023-03-01 RX ADMIN — OXYCODONE HYDROCHLORIDE 10 MG: 10 TABLET ORAL at 15:25

## 2023-03-01 RX ADMIN — SERTRALINE 100 MG: 100 TABLET, FILM COATED ORAL at 05:50

## 2023-03-01 RX ADMIN — LIDOCAINE HYDROCHLORIDE 30 ML: 20 SOLUTION OROPHARYNGEAL at 21:40

## 2023-03-01 RX ADMIN — POTASSIUM BICARBONATE 25 MEQ: 978 TABLET, EFFERVESCENT ORAL at 13:58

## 2023-03-01 RX ADMIN — SERTRALINE 100 MG: 100 TABLET, FILM COATED ORAL at 17:40

## 2023-03-01 RX ADMIN — QUETIAPINE FUMARATE 50 MG: 25 TABLET ORAL at 21:40

## 2023-03-01 RX ADMIN — POTASSIUM CHLORIDE 40 MEQ: 1500 TABLET, EXTENDED RELEASE ORAL at 05:50

## 2023-03-01 RX ADMIN — OXYCODONE 5 MG: 5 TABLET ORAL at 05:54

## 2023-03-01 RX ADMIN — Medication 5 MG: at 21:40

## 2023-03-01 RX ADMIN — OMEPRAZOLE 40 MG: 20 CAPSULE, DELAYED RELEASE ORAL at 05:50

## 2023-03-01 RX ADMIN — PIPERACILLIN AND TAZOBACTAM 4.5 G: 4; .5 INJECTION, POWDER, LYOPHILIZED, FOR SOLUTION INTRAVENOUS; PARENTERAL at 05:50

## 2023-03-01 RX ADMIN — ENOXAPARIN SODIUM 40 MG: 40 INJECTION SUBCUTANEOUS at 17:40

## 2023-03-01 RX ADMIN — POTASSIUM CHLORIDE AND SODIUM CHLORIDE: 900; 150 INJECTION, SOLUTION INTRAVENOUS at 08:27

## 2023-03-01 RX ADMIN — Medication 400 MG: at 08:24

## 2023-03-01 RX ADMIN — OXYCODONE HYDROCHLORIDE 10 MG: 10 TABLET ORAL at 21:40

## 2023-03-01 RX ADMIN — Medication 400 MG: at 17:40

## 2023-03-01 RX ADMIN — PIPERACILLIN AND TAZOBACTAM 4.5 G: 4; .5 INJECTION, POWDER, LYOPHILIZED, FOR SOLUTION INTRAVENOUS; PARENTERAL at 21:45

## 2023-03-01 RX ADMIN — PIPERACILLIN AND TAZOBACTAM 4.5 G: 4; .5 INJECTION, POWDER, LYOPHILIZED, FOR SOLUTION INTRAVENOUS; PARENTERAL at 14:00

## 2023-03-01 ASSESSMENT — ENCOUNTER SYMPTOMS
CHILLS: 0
SHORTNESS OF BREATH: 0
DEPRESSION: 1
SORE THROAT: 0
EYE PAIN: 0
SINUS PAIN: 0
NERVOUS/ANXIOUS: 0
BACK PAIN: 0
MYALGIAS: 0
CONSTIPATION: 1
HEADACHES: 0
BLOOD IN STOOL: 0
VOMITING: 0
TREMORS: 1
ABDOMINAL PAIN: 1
HEMOPTYSIS: 0
NAUSEA: 0
NECK PAIN: 0
BRUISES/BLEEDS EASILY: 0
FLANK PAIN: 1
DIARRHEA: 0
FEVER: 0

## 2023-03-01 ASSESSMENT — PAIN DESCRIPTION - PAIN TYPE
TYPE: ACUTE PAIN

## 2023-03-01 NOTE — PROGRESS NOTES
Infectious Disease Progress Note    Author: Leela Pop M.D. Date & Time of service: 3/1/2023  11:21 AM    Chief Complaint:  Psoas abscess    Interval History:   72 y.o. female admitted 2023 as a transfer for worsening right-sided flank pain due to mass    AF abd pain controlled with oxy.  Mult fecal incontinence episodes yesterday-none today  3/1 AF WBC 9.7 creat 0.78 continued abd pain/cramping    Labs Reviewed and Medications Reviewed.    Review of Systems:  Review of Systems   Constitutional:  Positive for malaise/fatigue. Negative for fever.   Gastrointestinal:  Positive for abdominal pain.   Neurological:  Positive for tremors.     Hemodynamics:  Temp (24hrs), Av.6 °C (97.8 °F), Min:36.3 °C (97.3 °F), Max:36.7 °C (98.1 °F)  Temperature: 36.7 °C (98.1 °F)  Pulse  Av.5  Min: 67  Max: 112   Blood Pressure : 135/69       Physical Exam:  Physical Exam  Vitals and nursing note reviewed.   Constitutional:       Appearance: She is ill-appearing.   Cardiovascular:      Rate and Rhythm: Normal rate.   Pulmonary:      Effort: Pulmonary effort is normal. No respiratory distress.   Abdominal:      Tenderness: There is abdominal tenderness. There is no guarding.      Comments: drain       Meds:    Current Facility-Administered Medications:     0.9 % NaCl with KCl 20 mEq 1,000 mL    potassium bicarbonate    magnesium oxide    oxyCODONE immediate-release **OR** oxyCODONE immediate-release **OR** morphine injection    bismuth subsalicylate    enoxaparin (LOVENOX) injection    LORazepam **OR** LORazepam    QUEtiapine    melatonin    LORazepam **OR** LORazepam    LORazepam **OR** LORazepam    LORazepam **OR** LORazepam    LORazepam **OR** LORazepam    omeprazole    acetaminophen    Notify provider if pain remains uncontrolled **AND** Use the Numeric Rating Scale (NRS), Flores-Baker Faces (WBF), or FLACC on regular floors and Critical-Care Pain Observation Tool (CPOT) on ICUs/Trauma to assess pain **AND**  Pulse Ox **AND** Pharmacy Consult Request **AND** If patient difficult to arouse and/or has respiratory depression (respiratory rate of 10 or less), stop any opiates that are currently infusing and call a Rapid Response.    ondansetron    ondansetron    sertraline    [COMPLETED] piperacillin-tazobactam **AND** piperacillin-tazobactam    Labs:  Recent Labs     02/27/23 0035 03/01/23  0522   WBC 11.7* 9.7   RBC 3.66* 3.84*   HEMOGLOBIN 9.4* 9.8*   HEMATOCRIT 28.9* 30.5*   MCV 79.0* 79.4*   MCH 25.7* 25.5*   RDW 50.8* 50.8*   PLATELETCT 352 338   MPV 10.9 10.5   NEUTSPOLYS 79.40* 74.40*   LYMPHOCYTES 13.80* 15.80*   MONOCYTES 4.60 5.60   EOSINOPHILS 1.10 2.50   BASOPHILS 0.50 0.80       Recent Labs     02/27/23 0035 03/01/23 0522   SODIUM 134* 141   POTASSIUM 3.5* 2.9*   CHLORIDE 104 106   CO2 21 22   GLUCOSE 113* 113*   BUN 14 9       Recent Labs     02/27/23 0035 03/01/23  0522   CREATININE 0.69 0.78         Imaging:  CT-ABDOMEN-PELVIS WITH    Result Date: 2/28/2023 2/28/2023 9:33 AM HISTORY/REASON FOR EXAM:  Sepsis; Known psoas abscess s/p drain. TECHNIQUE/EXAM DESCRIPTION:   CT scan of the abdomen and pelvis with contrast. Contrast-enhanced helical scanning was obtained from the diaphragmatic domes through the pubic symphysis following the bolus administration of nonionic contrast without complication. 100 mL of Omnipaque 350 nonionic contrast was administered without complication. Low dose optimization technique was utilized for this CT exam including automated exposure control and adjustment of the mA and/or kV according to patient size. COMPARISON: CT abdomen and pelvis from outside facility 2/23/2023 FINDINGS: Lower Chest: Trace pleural effusions and small pericardial effusion. Liver: Normal. Spleen: Enlarged measuring 15 cm craniocaudal. Pancreas: Unremarkable. Gallbladder: Cholelithiasis. Biliary: Nondilated. Adrenal glands: Normal. Kidneys: There are small renal cysts seen. There are several tiny  subcentimeter hypodense lesions which are too small to accurately characterize, likely additional tiny cysts. There are nonobstructing left renal stones. No significant hydronephrosis. Bowel: Suboptimal evaluation for colonic wall thickening due to areas of bowel decompression. There could be some sigmoid and additional scattered colonic wall thickening. Cannot exclude colitis. No small bowel obstruction. There is scattered colonic diverticulosis. Lymph nodes: No adenopathy. Vasculature: Scattered atherosclerosis. Peritoneum: Diffuse infiltration of the mesenteric fat, edema versus inflammation. Musculoskeletal: There is marked interval decrease in size of previously seen large multiloculated right psoas, posterior pararenal and posterior subcutaneous flank abscess. There is a percutaneous pigtail drainage catheter in place. There is residual abscess in the subcutaneous tissues of the right flank measuring about 3.9 x 4.7 cm.. Right femoral ORIF. Old posterior left rib fractures Pelvis: Bladder is decompressed by Ferrera catheter.     1.  Marked interval decrease in size of large right psoas, posterior pararenal and subcutaneous right flank abscess. There is percutaneous pigtail drainage catheter in place. There is residual abscess in the subcutaneous tissues measuring about 3.9 x 4.7  cm. 2.  Trace pleural effusions. 3.  Splenomegaly. 4.  Nonobstructing left renal stones. 5.  Limited evaluation for bowel wall thickening due to decompression. There could be sigmoid colon wall thickening and some scattered fluid within the colon. Correlate for colitis. No bowel obstruction. 6.  Mesenteric and body wall edema.    CT-DRAIN-RETROPERITONEAL    Result Date: 2/24/2023 2/24/2023 4:17 PM HISTORY/REASON FOR EXAM: Right psoas abscess TECHNIQUE/EXAM DESCRIPTION: Right retroperitoneal abscess drainage with CT guidance. Low dose optimization technique was utilized for this CT exam including automated exposure control and  adjustment of the mA and/or kV according to patient size. PROCEDURE: Informed consent was obtained from the patient's  via telephone consent. Moderate sedation with Fentanyl and Versed was administered during the procedure with appropriate continuous patient monitoring by the radiology nurse. Intraservice duration:  20 minutes Localizing CT images were obtained with the patient in prone position. The skin was prepped with ChloraPrep and draped in a sterile fashion. Following local anesthesia with 1% Lidocaine, a 17 G guiding needle was placed and needle path confirmed with CT. An Amplatz guidewire was placed and following serial tract dilatation, a 12 [place] pigtail locking catheter was placed. A specimen was collected and submitted for culture and sensitivity and Gram stain. Total of 125 mL purulent fluid was drained. The catheter was secured to the skin and connected to suction bulb drainage. Final CT images were obtained documenting catheter position. The patient tolerated the procedure well with no evidence of complication. COMPARISON: None available. FINDINGS: The final CT images show satisfactory catheter position within the target collection.     1.  CT guided placement of a percutaneous drainage catheter in a right psoas fluid collection. 2.  The current plan is to obtain a follow-up CT scan in 5-7 days.    [id number] VIEW    Result Date: 2/24/2023 2/24/2023 12:58 AM HISTORY/REASON FOR EXAM:  Abdominal Pain. TECHNIQUE/EXAM DESCRIPTION AND NUMBER OF VIEWS:   1 views of the abdomen. COMPARISON: Outside facility CT abdomen pelvis FINDINGS: Loops of bowel and colon are indistinct. No free intraperitoneal air is identified though evaluation is limited on supine imaging.  No pathologic calcification is noted. Osteopenia. Right femoral hardware.     Loops of bowel and colon are somewhat indistinct, possibly related to technique. Appearance appears overall somewhat similar to outside facility CT abdomen  pelvis from 2023. If symptoms have changed from recent CT, CT evaluation is recommended.    US-EXTREMITY VENOUS LOWER BILAT    Result Date: 2023   Vascular Laboratory  CONCLUSIONS  No evidence of deep venous thrombosis bilaterally.  AUBREY BEAN  Exam Date:     2023 12:21  Room #:     Inpatient  Priority:     Routine  Ht (in):     65      Wt (lb):     100  Ordering Physician:        JIMI VILLA  Referring Physician:       DAISY Chen  Sonographer:               Reed Navarro                             RDCS, RVT  Study Type:                Complete Bilateral  Technical Quality:         Adequate  Age:    72    Gender:     F  MRN:    [id number]  :    1950      BSA:    1.47  Indications:     Swelling of Limb  CPT Codes:       19333  ICD Codes:       729.81  History:         Bilateral lower extremity swelling  Limitations:  PROCEDURES:  Bilateral lower extremity venous duplex imaging.  The following venous structures were evaluated: common femoral, deep  femoral, proximal great saphenous, femoral, popliteal  veins.  Serial compression, color, and spectral Doppler flow evaluations were  performed.  FINDINGS:  Bilateral lower extremities.  No evidence of deep venous thrombosis bilaterally.  Jacobo Zeng  (Electronically Signed)  Final Date:      2023                   14:46    CT ABDOMEN-PELVIS WITH IV CONTRAST    Result Date: 2023  HISTORY/REASON FOR EXAM:  renal mass TECHNIQUE/EXAM DESCRIPTION: CT scan of the abdomen and pelvis with contrast.  Both automated exposure control and Automated adjustment of tube current based on patient size are utilized.  2023 6:20 PM. Total DLP: 255 mGy*cm COMPARISON: 1/10/2023 and 2023 FINDINGS: Lung bases: Atelectasis versus scar is now present within both lung bases. The masslike consolidation in the left base is markedly improved. Solid abdominal organs: There is marked interval change of the complex right renal  lesion as compared to the previous. The biopsy images dated 2/6/2023 demonstrated significant progression of the mass versus interval hemorrhage. On today's study, the large majority of the mass appears cystic with multifocal septations but there is significant direct invasion of the adjacent soft tissues with the tumor now abutting the skin surface. The maximal dimension of the lesion on today's study is approximately  11.4 cm x 5.9 cm on the axial imaging. Mass is displacing the kidney anteriorly. Is impossible to define whether the lesion originates from the kidney or the right adrenal gland. On the coronal images, this lesion measures approximately 14.3 cm and tracks along the psoas muscle. Given the marked interval change from the 1/10/2023 study 22/6/2023 study and a history of no interval procedure, tumoral hemorrhage at that time would be favored. Progressive hemorrhage was secondary abscess formation be a  strong consideration. Neoplasm remains a strong consideration. The abnormal soft tissue noted lateral to the left kidney abutting the hemidiaphragm on the prior examination has completely resolved which suggest a contralateral hemorrhage and interval resolution. Focal renal cystic lesions within the cortex appear benign. Small bilateral renal clipping system stones are again noted. Bowel, pelvis and osseous structures: Remainder the study is essentially stable produce extensive large bowel stool current exam suggesting fecal impaction. Osseous structures are moderately degenerative.     1.  Left-sided lung base and perirenal soft tissue mass of nearly completely resolved. This may suggest a previous focal hemorrhage and left basilar atelectasis or infection but in any case, a benign etiology is suggested. 2.  There is extensive progression of the mixed cystic and solid lesion involving the retroperitoneum on the right. This mass now extends to near the skin surface with a maximal dimension of 11.4 cm x  14.4 cm. Given a larger cystic component extending along the psoas muscle and the previous findings from the biopsy, tumoral hemorrhage between the January 10 study and February 6 study is favored with progressive hemorrhage from February 6 to today. Blood products may now have become infected given the large cystic components on the current exam. Underlying tumor as indicated on the January 10 study remains strong possibility.   CT-FNA BIOPSY W/ CT GUIDANCE    Result Date: 2/6/2023  HISTORY/REASON FOR EXAM:  retroperitoneal mass. TECHNIQUE/EXAM DESCRIPTION: CT guided needle placement.  CT guided right renal mass/retroperitoneal mass biopsy.  FINDINGS: Large heterogeneous mass arising from the right kidney and right psoas muscle. Interval progression from prior examination     Technically successful CT-guided biopsy of right renal mass/right retroperitoneal mass Nickolas Baker MD      Micro:  Results       Procedure Component Value Units Date/Time    C Diff by PCR rflx Toxin [[id number]] Collected: 02/28/23 1400    Order Status: Completed Specimen: Stool Updated: 02/28/23 1921     C Diff by PCR Negative     Comment: C. difficile NOT detected by PCR.  Treatment not indicated per guidelines.  Repeat testing not indicated within 7 days.          027-NAP1-BI Presumptive Negative     Comment: Presumptive 027/NAP1/BI target DNA sequences are NOT DETECTED.       Narrative:      Special Contact Isolation  [id number]  Does this patient have risk factors for C-diff?->Yes    CULTURE WOUND W/ GRAM STAIN [[id number]]  (Abnormal)  (Susceptibility) Collected: 02/24/23 1700    Order Status: Completed Specimen: Wound Updated: 02/27/23 1338     Significant Indicator POS     Source WND     Site Right Psoas     Culture Result -     Gram Stain Result Moderate WBCs.  No organisms seen.       Culture Result Citrobacter koseri  Moderate growth      Susceptibility       Citrobacter koseri (1)       Antibiotic  Interpretation Microscan   Method Status    Ampicillin Resistant >16 mcg/mL ROBERTH Final    Ceftriaxone Sensitive <=1 mcg/mL ROBERTH Final    Cefazolin Sensitive <=2 mcg/mL ROBERTH Final    Ciprofloxacin Sensitive <=0.25 mcg/mL ROBERTH Final    Cefepime Sensitive <=2 mcg/mL ROBERTH Final    Cefuroxime Sensitive <=4 mcg/mL ROBERTH Final    Ampicillin/sulbactam Sensitive <=4/2 mcg/mL ROBERTH Final    Ertapenem Sensitive <=0.5 mcg/mL ROBERTH Final    Tobramycin Sensitive <=2 mcg/mL ROBERTH Final    Gentamicin Sensitive <=2 mcg/mL ROBERTH Final    Minocycline Sensitive <=4 mcg/mL ROBERTH Final    Moxifloxacin Sensitive <=2 mcg/mL ROBERTH Final    Pip/Tazobactam Sensitive <=8 mcg/mL ROBERTH Final    Trimeth/Sulfa Sensitive <=0.5/9.5 mcg/mL ROBERTH Final    Tigecycline Sensitive <=2 mcg/mL ROBERTH Final                       Anaerobic Culture [[id number]] Collected: 02/24/23 1700    Order Status: Completed Specimen: Wound Updated: 02/27/23 1338     Significant Indicator NEG     Source WND     Site Right Psoas     Culture Result No Anaerobes isolated.    GRAM STAIN [[id number]] Collected: 02/24/23 1700    Order Status: Completed Specimen: Wound Updated: 02/25/23 0528     Significant Indicator .     Source WND     Site Right Psoas     Gram Stain Result Moderate WBCs.  No organisms seen.      FLUID CULTURE W/GRAM STAIN [[id number]]     Order Status: No result Specimen: Body Fluid from Peritoneal Fluid     Aerobic/Anaerobic Culture (Surgery) [[id number]]     Order Status: No result Specimen: Other from Peritoneal Fluid     URINALYSIS [[id number]]     Order Status: No result Specimen: Urine             Assessment:  Active Hospital Problems    Diagnosis     *Psoas abscess (HCC) [K68.12]     Urinary retention [R33.9]     External hemorrhoids [K64.4]     Other dysphagia [R13.19]     Alcohol withdrawal syndrome, with delirium (HCC) [F10.931]     Retroperitoneal mass [R19.00]     Sepsis with encephalopathy without septic shock (HCC) [A41.9, R65.20, G93.40]     Acute encephalopathy [G93.40]      Leg swelling [M79.89]     Gastroesophageal reflux disease with esophagitis [K21.00]     OAB (overactive bladder) [N32.81]     Moderate episode of recurrent major depressive disorder (HCC) [F33.1]    Psoas abscess  -CT 2/28    1.  Marked interval decrease in size of large right psoas, posterior pararenal and subcutaneous right flank abscess. There is percutaneous pigtail drainage catheter in place. There is residual abscess in the subcutaneous tissues measuring about 3.9 x 4.7 cm.  2.  Trace pleural effusions.  3.  Splenomegaly.  4.  Nonobstructing left renal stones.  5.  Limited evaluation for bowel wall thickening due to decompression. There could be sigmoid colon wall thickening and some scattered fluid within the colon. Correlate for colitis. No bowel obstruction.  6.  Mesenteric and body wall edema.  Urinary retention  Leukocytosis  EtOH withdrawal-CIWA        PLAN:   S/p IR drainage 2/24-multiloculated  CT 2/28 much improved-still need some drainage  Further work up of malignancy per PCT  Repeat CT scan one week to ensure continued resolution  Anticipate 1 more weeks IV followed by PO until resolved-can use Bactrim or Augmentin or minocycline     Midline-likely no. + oral options

## 2023-03-01 NOTE — DIETARY
Nutrition Services Update:    Day 6 of admit.  Mena Saenz Andres being followed by Nutrition to optimize intake    Current Diet: clear liquids - diet downgraded due to abdominal cramping.  Patient is day 6 of poor nutrition and was malnourished at admit.    Recommend: Consider nutrition support in the next 24 hrs if PO intake does not notably improve.  Messaged MD via Voalte.    RD following.

## 2023-03-01 NOTE — PROGRESS NOTES
Hospital Medicine Daily Progress Note    Date of Service  3/1/2023    Chief Complaint  Mena Campos is a 72 y.o. female with GERD, MDD, EtOH use DO, and Left Renal / RP mass followed by oncologist Dr. Campos admitted 2/23/2023 with Right psoas abscess    Hospital Course  No notes on file    Mena Campos is a 72 y.o. female who presented 2/23/2023 with past medical history of repeated urine infection who comes into the hospital for right-sided flank pain.  This has been occurring for the past 2 months.  She was found to have right-sided kidney mass on January 10.  She had a biopsy of this mass on February 6.  She went to visit an oncologist today Dr. Campos who saw the biopsy results and noticed it being an abscess.  He sent her to the emergency room right away.  Repeat CT scan was completed found a psoas abscess.  General surgery recommended IR drainage.  Status post IR drain placed 2/24.  Cultures grew Citrobacter koseri.  ID was consulted.  Patient was started on Zosyn.    Repeat CT on 2/28 showed increase in size of psoas abscess measuring 3.9 x 4.7 cm with pigtail drainage catheter in place, trace blood fusions, splenomegaly, nonobstructive left renal stones, mesenteric and body wall edema.  C. difficile is negative.    Discussed with interventional radiology Dr. Sandoval.  Psoas abscess was multiloculated.  Drain has minimal output and was discontinued on 3/1.  Remaining abscess is loculated and not connected to the drain, it is currently too small to place a new drain and will hopefully resolve with antibiotics.    Hospital course was also complicated by alcohol withdrawal.    Interval Problem Update  Patient was seen and examined at bedside.  I have personally reviewed and interpreted vitals, labs, and imaging.    2/28.  Afebrile.  Slightly hypertensive.  On 0-1 L nasal cannula.  Leukocytosis trending down to 11.7.  Repeat potassium, magnesium, phosphorus.  Denies fever, chills, chest pains,  shortness of breath.  Has been having abdominal cramping and loose bowel movements.  Screen for C. difficile.  Continue Zosyn for now.  De-escalated back to clear liquid diet as patient having severe abdominal pain.  3/1.  Afebrile.  Slightly hypertensive.  On room air.  Leukocytosis resolved.  Replete K, Mg.  Start maintenance fluids denies fevers, chills, chest pains, shortness of breath.  Patient still have abdominal cramping but this is improved from yesterday.  Tolerating liquid diet.  Discussed TPN with patient and concern for malnutrition versus risks of TPN.  Patient would like to hold off on TPN and continue to try oral diet for now.  DC psoas drain today.  ID recommends 1 more week of IV antibiotics followed by oral.    I have discussed this patient's plan of care and discharge plan at IDT rounds today with Case Management, Nursing, Nursing leadership, and other members of the IDT team.    Consultants/Specialty  general surgery and interventional radiology    Code Status  Full Code    Disposition  Patient is not medically cleared for discharge.   Anticipate discharge to to home with organized home healthcare and close outpatient follow-up.  I have placed the appropriate orders for post-discharge needs.    Review of Systems  Review of Systems   Constitutional:  Positive for malaise/fatigue. Negative for chills and fever.   HENT:  Negative for ear pain, nosebleeds, sinus pain and sore throat.    Eyes:  Negative for pain.   Respiratory:  Negative for hemoptysis and shortness of breath.    Cardiovascular:  Negative for chest pain and leg swelling.   Gastrointestinal:  Positive for abdominal pain and constipation. Negative for blood in stool, diarrhea, melena, nausea and vomiting.   Genitourinary:  Positive for flank pain. Negative for dysuria and hematuria.   Musculoskeletal:  Negative for back pain, joint pain, myalgias and neck pain.   Neurological:  Negative for headaches.   Endo/Heme/Allergies:  Does not  bruise/bleed easily.   Psychiatric/Behavioral:  Positive for depression. The patient is not nervous/anxious.       Physical Exam  Temp:  [36.3 °C (97.3 °F)-36.7 °C (98 °F)] 36.3 °C (97.3 °F)  Pulse:  [67-91] 86  Resp:  [16-19] 18  BP: (129-152)/(70-93) 129/82  SpO2:  [96 %-98 %] 97 %    Physical Exam  Vitals and nursing note reviewed. Exam conducted with a chaperone present (Primary RN Agapito at bedside).   Constitutional:       General: She is not in acute distress.     Appearance: She is ill-appearing (Chronically). She is not toxic-appearing or diaphoretic.   HENT:      Head:      Comments: Bitemporal wasting     Nose: Nose normal.      Mouth/Throat:      Mouth: Mucous membranes are moist.   Eyes:      General: No scleral icterus.     Conjunctiva/sclera: Conjunctivae normal.   Cardiovascular:      Rate and Rhythm: Normal rate and regular rhythm.      Pulses: Normal pulses.      Heart sounds: Normal heart sounds. No murmur heard.    No friction rub. No gallop.   Pulmonary:      Effort: Pulmonary effort is normal. No respiratory distress.      Breath sounds: Normal breath sounds. No wheezing, rhonchi or rales.   Abdominal:      General: Abdomen is flat. Bowel sounds are normal. There is no distension.      Palpations: Abdomen is soft.      Tenderness: There is no abdominal tenderness. There is right CVA tenderness (Mild at psoas drain site). There is no guarding or rebound.      Comments: Right RADHA drain output purulent   Genitourinary:     Comments: +Ferrera, urine yellow / clear  Musculoskeletal:      Cervical back: Neck supple.      Right lower leg: No edema.      Left lower leg: No edema.   Skin:     General: Skin is warm.   Neurological:      Mental Status: She is alert.      Motor: No tremor.   Psychiatric:         Attention and Perception: Attention and perception normal.         Mood and Affect: Mood normal. Affect is blunt.         Speech: Speech normal.         Behavior: Behavior normal. Behavior is  cooperative.       Fluids    Intake/Output Summary (Last 24 hours) at 3/1/2023 0626  Last data filed at 3/1/2023 0400  Gross per 24 hour   Intake 50 ml   Output 1320 ml   Net -1270 ml         Laboratory  Recent Labs     02/27/23 0035 03/01/23  0522   WBC 11.7* 9.7   RBC 3.66* 3.84*   HEMOGLOBIN 9.4* 9.8*   HEMATOCRIT 28.9* 30.5*   MCV 79.0* 79.4*   MCH 25.7* 25.5*   MCHC 32.5* 32.1*   RDW 50.8* 50.8*   PLATELETCT 352 338   MPV 10.9 10.5       Recent Labs     02/27/23 0035 03/01/23 0522   SODIUM 134* 141   POTASSIUM 3.5* 2.9*   CHLORIDE 104 106   CO2 21 22   GLUCOSE 113* 113*   BUN 14 9   CREATININE 0.69 0.78   CALCIUM 8.6 8.5                       Imaging  CT-ABDOMEN-PELVIS WITH   Final Result      1.  Marked interval decrease in size of large right psoas, posterior pararenal and subcutaneous right flank abscess. There is percutaneous pigtail drainage catheter in place. There is residual abscess in the subcutaneous tissues measuring about 3.9 x 4.7    cm.   2.  Trace pleural effusions.   3.  Splenomegaly.   4.  Nonobstructing left renal stones.   5.  Limited evaluation for bowel wall thickening due to decompression. There could be sigmoid colon wall thickening and some scattered fluid within the colon. Correlate for colitis. No bowel obstruction.   6.  Mesenteric and body wall edema.      CT-DRAIN-RETROPERITONEAL   Final Result      1.  CT guided placement of a percutaneous drainage catheter in a right psoas fluid collection.   2.  The current plan is to obtain a follow-up CT scan in 5-7 days.      US-EXTREMITY VENOUS LOWER BILAT   Final Result      OUTSIDE IMAGES-CT ABDOMEN /PELVIS   Final Result      [id number] VIEW   Final Result      Loops of bowel and colon are somewhat indistinct, possibly related to technique. Appearance appears overall somewhat similar to outside facility CT abdomen pelvis from 2/23/2023. If symptoms have changed from recent CT, CT evaluation is recommended.      IR-CONSULT AND TREAT     (Results Pending)          Assessment/Plan  * Psoas abscess (HCC)- (present on admission)  Assessment & Plan  Presented to [facility] [place] pain  CT demonstrated psoas abscess prompting transfer to [facility] for IR  General surgery consulted and s/o, recommended IR-guided drainage  IR-guided drainage 2/24/23, Repeat CT 2/28/23  RADHA output < 50 cc, requested RN flushes BID and record output  Abscess culture +Citrobacter koseri, pan-susceptible  Continue zosyn for now  ID consulted for antibiotic planning after source control  HIV negative    Urinary retention  Assessment & Plan  Likely due to oxybutynin - discontinued  No UA from prior to haq placement  Zosyn expected to cover most urine pathogens - culture deferred  Trial haq discontinuation prior to discharge, if unsuccessful will refer to urology    External hemorrhoids  Assessment & Plan  Bowel protocol - senna QHS PRN, miralax PRN    Other dysphagia- (present on admission)  Assessment & Plan  Resolved  SLP evaluation  Modified barium swallow deferred per SLP recommendation    Retroperitoneal mass- (present on admission)  Assessment & Plan  Underwent biopsy of Left renal pole mass extending into RP, it was not indicative of malignancy  Follow up with Oncologist Dr. Campos for further diagnostic evaluation after discharge    Hypokalemia- (present on admission)  Assessment & Plan  Resolved  Likely due to inadequate PO intake from EtOH use DO  Mg WNL  Kdur 40 daily  Repeat AM BMP    Hyponatremia- (present on admission)  Assessment & Plan  Mild, recurrent  Likely due to poor PO intake from EtOH use DO  Repeat AM BMP    Microcytic anemia- (present on admission)  Assessment & Plan  Follows with Heme-Onc Dr. Campos  Elevated ferritin indicative of AOCD  Transfuse for Hgb <7    Alcohol withdrawal syndrome, with delirium (HCC)- (present on admission)  Assessment & Plan  Resolved  Reports >2 beers daily, most recent 2/22/23  Developed diaphoresis, delirium, tremor,  tachycardia on HD#2  CIWA-Lorazepam protocol  Empiric MVN + B12 + Folate + Thiamine  Will discuss MAT / AA prior to discharge    Leg swelling- (present on admission)  Assessment & Plan  BLE US negative for DVT  TTE deferred    Acute encephalopathy- (present on admission)  Assessment & Plan  Resolved  Likely multifactorial including underlying abscess and EtOH withdrawal - see separate plans  Nonfocal, CTH deferred    Sepsis with encephalopathy without septic shock (HCC)- (present on admission)  Assessment & Plan  This is Sepsis Present on admission  SIRS criteria identified on my evaluation include: Fever, with temperature greater than 101 deg F, Tachypnea, with respirations greater than 20 per minute and Leukocytosis, with WBC greater than 12,000  Source is right psoas abscess  Sepsis protocol initiated  Fluid resuscitation ordered per protocol  Crystalloid Fluid Administration: Fluid resuscitation ordered per standard protocol - 30 mL/kg per current or ideal body weight  IV antibiotics as appropriate for source of sepsis  Reassessment: I have reassessed the patient's hemodynamic status    BCx 2/23: NGTD  Abscess Cx 2/24: +Citrobacter koseri  Continue zosyn for intra-abdominal abscess  Leukocytosis improving  ID consulted for antibiotic planning after source control    Gastroesophageal reflux disease with esophagitis- (present on admission)  Assessment & Plan  Likely exacerbated by EtOH use DO  Continue omeprazole    OAB (overactive bladder)- (present on admission)  Assessment & Plan  Discontinued oxybutynin due to urinary retention    Moderate episode of recurrent major depressive disorder (HCC)- (present on admission)  Assessment & Plan  Continue sertraline       VTE prophylaxis: SCDs/TEDs and pharmacologic prophylaxis contraindicated due to psoas abscess drain 2/24    I have performed a physical exam and reviewed and updated ROS and Plan today (3/1/2023). In review of yesterday's note (2/28/2023), there are no  changes except as documented above.

## 2023-03-01 NOTE — CARE PLAN
"The patient is Watcher - Medium risk of patient condition declining or worsening    Shift Goals  Clinical Goals: CT, pain management  Patient Goals: comfort  Family Goals: n/a    Progress made toward(s) clinical / shift goals:    Oxycodone given for pain earlier. Reports relief. Resting in between care.  Ferrera to dd with quantity sufficient output.  Right flank Nick flushed without difficulty. 10ml serous out all night.    Patient is not progressing towards the following goals:  Poor po intake. Refusing to eat due to cramping and loose bm \"everytime I eat or drink anything\".  Generalized weakness.   Loose bm.    Problem: Hemodynamics  Goal: Patient's hemodynamics, fluid balance and neurologic status will be stable or improve  Outcome: Progressing  Note: Slight HTN at beginning of shift. Improved this am. Afebrile.     Problem: Respiratory  Goal: Patient will achieve/maintain optimum respiratory ventilation and gas exchange  Outcome: Progressing  Note: On RA. Respirations shallow and unlabored.     "

## 2023-03-02 LAB
ANION GAP SERPL CALC-SCNC: 10 MMOL/L (ref 7–16)
BASOPHILS # BLD AUTO: 0.9 % (ref 0–1.8)
BASOPHILS # BLD: 0.08 K/UL (ref 0–0.12)
BUN SERPL-MCNC: 10 MG/DL (ref 8–22)
CALCIUM SERPL-MCNC: 8.8 MG/DL (ref 8.5–10.5)
CHLORIDE SERPL-SCNC: 106 MMOL/L (ref 96–112)
CO2 SERPL-SCNC: 23 MMOL/L (ref 20–33)
CREAT SERPL-MCNC: 0.84 MG/DL (ref 0.5–1.4)
EOSINOPHIL # BLD AUTO: 0.25 K/UL (ref 0–0.51)
EOSINOPHIL NFR BLD: 2.8 % (ref 0–6.9)
ERYTHROCYTE [DISTWIDTH] IN BLOOD BY AUTOMATED COUNT: 50.2 FL (ref 35.9–50)
GFR SERPLBLD CREATININE-BSD FMLA CKD-EPI: 74 ML/MIN/1.73 M 2
GLUCOSE SERPL-MCNC: 119 MG/DL (ref 65–99)
HCT VFR BLD AUTO: 29.1 % (ref 37–47)
HGB BLD-MCNC: 9.4 G/DL (ref 12–16)
IMM GRANULOCYTES # BLD AUTO: 0.09 K/UL (ref 0–0.11)
IMM GRANULOCYTES NFR BLD AUTO: 1 % (ref 0–0.9)
LYMPHOCYTES # BLD AUTO: 1.46 K/UL (ref 1–4.8)
LYMPHOCYTES NFR BLD: 16.4 % (ref 22–41)
MAGNESIUM SERPL-MCNC: 1.8 MG/DL (ref 1.5–2.5)
MCH RBC QN AUTO: 25.3 PG (ref 27–33)
MCHC RBC AUTO-ENTMCNC: 32.3 G/DL (ref 33.6–35)
MCV RBC AUTO: 78.2 FL (ref 81.4–97.8)
MONOCYTES # BLD AUTO: 0.48 K/UL (ref 0–0.85)
MONOCYTES NFR BLD AUTO: 5.4 % (ref 0–13.4)
NEUTROPHILS # BLD AUTO: 6.56 K/UL (ref 2–7.15)
NEUTROPHILS NFR BLD: 73.5 % (ref 44–72)
NRBC # BLD AUTO: 0 K/UL
NRBC BLD-RTO: 0 /100 WBC
PHOSPHATE SERPL-MCNC: 2.9 MG/DL (ref 2.5–4.5)
PLATELET # BLD AUTO: 342 K/UL (ref 164–446)
PMV BLD AUTO: 10.9 FL (ref 9–12.9)
POTASSIUM SERPL-SCNC: 3.5 MMOL/L (ref 3.6–5.5)
RBC # BLD AUTO: 3.72 M/UL (ref 4.2–5.4)
SODIUM SERPL-SCNC: 139 MMOL/L (ref 135–145)
WBC # BLD AUTO: 8.9 K/UL (ref 4.8–10.8)

## 2023-03-02 PROCEDURE — 80048 BASIC METABOLIC PNL TOTAL CA: CPT

## 2023-03-02 PROCEDURE — 84100 ASSAY OF PHOSPHORUS: CPT

## 2023-03-02 PROCEDURE — 700101 HCHG RX REV CODE 250: Performed by: INTERNAL MEDICINE

## 2023-03-02 PROCEDURE — 36415 COLL VENOUS BLD VENIPUNCTURE: CPT

## 2023-03-02 PROCEDURE — A9270 NON-COVERED ITEM OR SERVICE: HCPCS | Performed by: INTERNAL MEDICINE

## 2023-03-02 PROCEDURE — 700111 HCHG RX REV CODE 636 W/ 250 OVERRIDE (IP): Performed by: INTERNAL MEDICINE

## 2023-03-02 PROCEDURE — 99233 SBSQ HOSP IP/OBS HIGH 50: CPT | Performed by: INTERNAL MEDICINE

## 2023-03-02 PROCEDURE — 700105 HCHG RX REV CODE 258: Performed by: INTERNAL MEDICINE

## 2023-03-02 PROCEDURE — 700102 HCHG RX REV CODE 250 W/ 637 OVERRIDE(OP): Performed by: INTERNAL MEDICINE

## 2023-03-02 PROCEDURE — 700102 HCHG RX REV CODE 250 W/ 637 OVERRIDE(OP): Performed by: STUDENT IN AN ORGANIZED HEALTH CARE EDUCATION/TRAINING PROGRAM

## 2023-03-02 PROCEDURE — A9270 NON-COVERED ITEM OR SERVICE: HCPCS | Performed by: STUDENT IN AN ORGANIZED HEALTH CARE EDUCATION/TRAINING PROGRAM

## 2023-03-02 PROCEDURE — 85025 COMPLETE CBC W/AUTO DIFF WBC: CPT

## 2023-03-02 PROCEDURE — 770004 HCHG ROOM/CARE - ONCOLOGY PRIVATE *

## 2023-03-02 PROCEDURE — 700102 HCHG RX REV CODE 250 W/ 637 OVERRIDE(OP): Performed by: HOSPITALIST

## 2023-03-02 PROCEDURE — 83735 ASSAY OF MAGNESIUM: CPT

## 2023-03-02 PROCEDURE — A9270 NON-COVERED ITEM OR SERVICE: HCPCS | Performed by: HOSPITALIST

## 2023-03-02 RX ORDER — LANOLIN ALCOHOL/MO/W.PET/CERES
400 CREAM (GRAM) TOPICAL 2 TIMES DAILY
Status: COMPLETED | OUTPATIENT
Start: 2023-03-02 | End: 2023-03-02

## 2023-03-02 RX ADMIN — QUETIAPINE FUMARATE 50 MG: 25 TABLET ORAL at 21:42

## 2023-03-02 RX ADMIN — OMEPRAZOLE 40 MG: 20 CAPSULE, DELAYED RELEASE ORAL at 05:30

## 2023-03-02 RX ADMIN — Medication 400 MG: at 10:24

## 2023-03-02 RX ADMIN — DIBASIC SODIUM PHOSPHATE, MONOBASIC POTASSIUM PHOSPHATE AND MONOBASIC SODIUM PHOSPHATE 500 MG: 852; 155; 130 TABLET ORAL at 10:25

## 2023-03-02 RX ADMIN — LIDOCAINE HYDROCHLORIDE 30 ML: 20 SOLUTION OROPHARYNGEAL at 21:43

## 2023-03-02 RX ADMIN — OXYCODONE HYDROCHLORIDE 10 MG: 10 TABLET ORAL at 16:19

## 2023-03-02 RX ADMIN — POTASSIUM CHLORIDE AND SODIUM CHLORIDE: 900; 150 INJECTION, SOLUTION INTRAVENOUS at 12:26

## 2023-03-02 RX ADMIN — LIDOCAINE HYDROCHLORIDE 30 ML: 20 SOLUTION OROPHARYNGEAL at 05:28

## 2023-03-02 RX ADMIN — LIDOCAINE HYDROCHLORIDE 30 ML: 20 SOLUTION OROPHARYNGEAL at 14:02

## 2023-03-02 RX ADMIN — Medication 400 MG: at 18:19

## 2023-03-02 RX ADMIN — SERTRALINE 100 MG: 100 TABLET, FILM COATED ORAL at 18:19

## 2023-03-02 RX ADMIN — OXYCODONE HYDROCHLORIDE 10 MG: 10 TABLET ORAL at 05:30

## 2023-03-02 RX ADMIN — POTASSIUM BICARBONATE 25 MEQ: 978 TABLET, EFFERVESCENT ORAL at 14:02

## 2023-03-02 RX ADMIN — PIPERACILLIN AND TAZOBACTAM 4.5 G: 4; .5 INJECTION, POWDER, LYOPHILIZED, FOR SOLUTION INTRAVENOUS; PARENTERAL at 16:51

## 2023-03-02 RX ADMIN — ENOXAPARIN SODIUM 40 MG: 40 INJECTION SUBCUTANEOUS at 18:19

## 2023-03-02 RX ADMIN — PIPERACILLIN AND TAZOBACTAM 4.5 G: 4; .5 INJECTION, POWDER, LYOPHILIZED, FOR SOLUTION INTRAVENOUS; PARENTERAL at 05:28

## 2023-03-02 RX ADMIN — SERTRALINE 100 MG: 100 TABLET, FILM COATED ORAL at 05:30

## 2023-03-02 RX ADMIN — Medication 5 MG: at 21:42

## 2023-03-02 RX ADMIN — POTASSIUM BICARBONATE 25 MEQ: 978 TABLET, EFFERVESCENT ORAL at 10:25

## 2023-03-02 RX ADMIN — OXYCODONE HYDROCHLORIDE 10 MG: 10 TABLET ORAL at 21:43

## 2023-03-02 ASSESSMENT — ENCOUNTER SYMPTOMS
FEVER: 0
CHILLS: 0
NAUSEA: 0
EYE PAIN: 0
SHORTNESS OF BREATH: 0
SINUS PAIN: 0
NECK PAIN: 0
CONSTIPATION: 1
BLOOD IN STOOL: 0
MYALGIAS: 0
HEMOPTYSIS: 0
BRUISES/BLEEDS EASILY: 0
FLANK PAIN: 1
HEADACHES: 0
BACK PAIN: 0
ABDOMINAL PAIN: 1
VOMITING: 0
SORE THROAT: 0
NERVOUS/ANXIOUS: 0
ROS GI COMMENTS: LOOSE STOOLS
TREMORS: 1
DEPRESSION: 1
DIARRHEA: 0

## 2023-03-02 NOTE — CARE PLAN
The patient is Watcher - Medium risk of patient condition declining or worsening    Shift Goals  Clinical Goals: safety, skin care, pain control  Patient Goals: pain control, feel better when eating  Family Goals: none present    Progress made toward(s) clinical / shift goals:  Patient having abd cramping and BM immediately with PO intake. Incontinence cleaned ASAP, with barrier wipes and cream used. Rosalee area is macerated. Pain controlled with PRN medication.     Patient is not progressing towards the following goals: NA      Problem: Fluid Volume  Goal: Fluid volume balance will be maintained  Outcome: Progressing     Problem: Pain - Standard  Goal: Alleviation of pain or a reduction in pain to the patient’s comfort goal  Outcome: Progressing     Problem: Skin Integrity  Goal: Skin integrity is maintained or improved  Outcome: Progressing

## 2023-03-02 NOTE — DISCHARGE PLANNING
Case Management Discharge Planning    Admission Date: 2/23/2023  GMLOS: 5  ALOS: 7    6-Clicks ADL Score: 18  6-Clicks Mobility Score: 18      Anticipated Discharge Dispo: Discharge Disposition: Discharged to home/self care (01)    DME Needed: No    Action(s) Taken: Updated Provider/Nurse on Discharge Plan    Escalations Completed: Provider and Bedside RN    Medically Clear: No    Next Steps: Patient discussed during morning IDT rounds with team. Patient is not medically cleared for discharge at this time. Patient continues to be on IV ABX. Patient informed MD that she wants to hold off on TPN for now. CM to continue to follow for discharge planning needs, no needs at this time.     Barriers to Discharge: Medical clearance    Is the patient up for discharge tomorrow: No

## 2023-03-02 NOTE — CARE PLAN
The patient is Stable - Low risk of patient condition declining or worsening    Shift Goals  Clinical Goals: pain control, rest  Patient Goals: pain control  Family Goals: none present    Progress made toward(s) clinical / shift goals:    Problem: Knowledge Deficit - Standard  Goal: Patient and family/care givers will demonstrate understanding of plan of care, disease process/condition, diagnostic tests and medications  Outcome: Progressing   Pt AOx4, educated on plan of care pt verbalizes understanding.   Problem: Hemodynamics  Goal: Patient's hemodynamics, fluid balance and neurologic status will be stable or improve  Outcome: Progressing     Problem: Fluid Volume  Goal: Fluid volume balance will be maintained  Outcome: Progressing     Problem: Urinary - Renal Perfusion  Goal: Ability to achieve and maintain adequate renal perfusion and functioning will improve  Outcome: Progressing     Problem: Respiratory  Goal: Patient will achieve/maintain optimum respiratory ventilation and gas exchange  Outcome: Progressing     Problem: Mechanical Ventilation  Goal: Safe management of artificial airway and ventilation  Outcome: Progressing  Goal: Successful weaning off mechanical ventilator, spontaneously maintains adequate gas exchange  Outcome: Progressing  Goal: Patient will be able to express needs and understand communication  Outcome: Progressing     Problem: Physical Regulation  Goal: Diagnostic test results will improve  Outcome: Progressing  Goal: Signs and symptoms of infection will decrease  Outcome: Progressing     Problem: Pain - Standard  Goal: Alleviation of pain or a reduction in pain to the patient’s comfort goal  Outcome: Progressing  Pain medication given per MAR.      Problem: Fall Risk  Goal: Patient will remain free from falls  Outcome: Progressing     Problem: Skin Integrity  Goal: Skin integrity is maintained or improved  Outcome: Progressing     Problem: Optimal Care for Alcohol Withdrawal  Goal:  Optimal Care for the alcohol withdrawal patient  Outcome: Progressing     Problem: Seizure Precautions  Goal: Implementation of seizure precautions  Outcome: Progressing     Problem: Lifestyle Changes  Goal: Patient's ability to identify lifestyle changes and available resources to help reduce recurrence of condition will improve  Outcome: Progressing     Problem: Psychosocial  Goal: Patient's level of anxiety will decrease  Outcome: Progressing  Goal: Spiritual and cultural needs incorporated into hospitalization  Outcome: Progressing     Problem: Risk for Aspiration  Goal: Patient's risk for aspiration will be absent or decrease  Outcome: Progressing       Patient is not progressing towards the following goals:

## 2023-03-02 NOTE — PROGRESS NOTES
Hospital Medicine Daily Progress Note    Date of Service  3/2/2023    Chief Complaint  Mena Campos is a 72 y.o. female with GERD, MDD, EtOH use DO, and Left Renal / RP mass followed by oncologist Dr. Campos admitted 2/23/2023 with Right psoas abscess    Hospital Course  No notes on file    Mena Campos is a 72 y.o. female who presented 2/23/2023 with past medical history of repeated urine infection who comes into the hospital for right-sided flank pain.  This has been occurring for the past 2 months.  She was found to have right-sided kidney mass on January 10.  She had a biopsy of this mass on February 6.  She went to visit an oncologist today Dr. Campos who saw the biopsy results and noticed it being an abscess.  He sent her to the emergency room right away.  Repeat CT scan was completed found a psoas abscess.  General surgery recommended IR drainage.  Status post IR drain placed 2/24.  Cultures grew Citrobacter koseri.  ID was consulted.  Patient was started on Zosyn.    Repeat CT on 2/28 showed increase in size of psoas abscess measuring 3.9 x 4.7 cm with pigtail drainage catheter in place, trace blood fusions, splenomegaly, nonobstructive left renal stones, mesenteric and body wall edema.  C. difficile is negative.    Discussed with interventional radiology Dr. Sandoval.  Psoas abscess was multiloculated.  Drain has minimal output and was discontinued on 3/1.  Remaining abscess is loculated and not connected to the drain, it is currently too small to place a new drain and will hopefully resolve with antibiotics.    Hospital course was also complicated by alcohol withdrawal.    Interval Problem Update  Patient was seen and examined at bedside.  I have personally reviewed and interpreted vitals, labs, and imaging.    2/28.  Afebrile.  Slightly hypertensive.  On 0-1 L nasal cannula.  Leukocytosis trending down to 11.7.  Repeat potassium, magnesium, phosphorus.  Denies fever, chills, chest pains,  shortness of breath.  Has been having abdominal cramping and loose bowel movements.  Screen for C. difficile.  Continue Zosyn for now.  De-escalated back to clear liquid diet as patient having severe abdominal pain.  3/1.  Afebrile.  Slightly hypertensive.  On room air.  Leukocytosis resolved.  Replete K, Mg.  Start maintenance fluids denies fevers, chills, chest pains, shortness of breath.  Patient still have abdominal cramping but this is improved from yesterday.  Tolerating liquid diet.  Discussed TPN with patient and concern for malnutrition versus risks of TPN.  Patient would like to hold off on TPN and continue to try oral diet for now.  DC psoas drain today.  ID recommends 1 more week of IV antibiotics followed by oral.  3/2.  Afebrile.  Stable vitals.  On room air.  Replete K, mag, phos.  Denies fevers, chills, chest pains, shortness of breath.  Had persistent abdominal cramping and diarrhea yesterday.  Reports he does feel better today but she has not tried eating yet.  States she feels better this morning and would like to try clear liquids again.  Would like to hold off on TPN as long as possible.  Discussed with ID.  Plan for repeat CT abdomen pelvis on 3/7.  Continue IV Zosyn until then.  If abscess is resolved can switch to oral antibiotics.    I have discussed this patient's plan of care and discharge plan at IDT rounds today with Case Management, Nursing, Nursing leadership, and other members of the IDT team.    Consultants/Specialty  general surgery, infectious disease, and interventional radiology    Code Status  Full Code    Disposition  Patient is not medically cleared for discharge.   Anticipate discharge to to home with organized home healthcare and close outpatient follow-up.  I have placed the appropriate orders for post-discharge needs.    Review of Systems  Review of Systems   Constitutional:  Positive for malaise/fatigue. Negative for chills and fever.   HENT:  Negative for ear pain,  nosebleeds, sinus pain and sore throat.    Eyes:  Negative for pain.   Respiratory:  Negative for hemoptysis and shortness of breath.    Cardiovascular:  Negative for chest pain and leg swelling.   Gastrointestinal:  Positive for abdominal pain and constipation. Negative for blood in stool, diarrhea, melena, nausea and vomiting.   Genitourinary:  Positive for flank pain. Negative for dysuria and hematuria.   Musculoskeletal:  Negative for back pain, joint pain, myalgias and neck pain.   Neurological:  Negative for headaches.   Endo/Heme/Allergies:  Does not bruise/bleed easily.   Psychiatric/Behavioral:  Positive for depression. The patient is not nervous/anxious.       Physical Exam  Temp:  [36.3 °C (97.4 °F)-37 °C (98.6 °F)] 36.3 °C (97.4 °F)  Pulse:  [74-89] 89  Resp:  [16-18] 17  BP: (121-135)/(69-78) 121/73  SpO2:  [95 %-97 %] 96 %    Physical Exam  Vitals and nursing note reviewed. Exam conducted with a chaperone present.   Constitutional:       Appearance: She is ill-appearing (Chronically).   HENT:      Head: Normocephalic and atraumatic.      Comments: Bitemporal wasting     Right Ear: External ear normal.      Left Ear: External ear normal.      Nose: Nose normal.      Mouth/Throat:      Mouth: Mucous membranes are dry.   Eyes:      Extraocular Movements: Extraocular movements intact.      Conjunctiva/sclera: Conjunctivae normal.   Cardiovascular:      Rate and Rhythm: Normal rate and regular rhythm.      Pulses: Normal pulses.      Heart sounds: Normal heart sounds. No murmur heard.    No friction rub. No gallop.   Pulmonary:      Effort: Pulmonary effort is normal. No respiratory distress.      Breath sounds: Normal breath sounds. No wheezing, rhonchi or rales.   Abdominal:      General: Abdomen is flat. Bowel sounds are normal. There is no distension.      Palpations: Abdomen is soft.      Tenderness: There is abdominal tenderness. There is right CVA tenderness (Mild at psoas drain site). There is no  guarding or rebound.      Comments: Right RADHA drain output purulent   Genitourinary:     Comments: +Ferrera, urine yellow / clear  Musculoskeletal:      Cervical back: Neck supple.      Right lower leg: No edema.      Left lower leg: No edema.   Skin:     General: Skin is warm.   Neurological:      General: No focal deficit present.      Mental Status: She is alert and oriented to person, place, and time. Mental status is at baseline.      Cranial Nerves: No cranial nerve deficit.      Motor: No tremor.   Psychiatric:         Attention and Perception: Attention and perception normal.         Mood and Affect: Mood normal. Affect is blunt.         Speech: Speech normal.         Behavior: Behavior normal. Behavior is cooperative.       Fluids    Intake/Output Summary (Last 24 hours) at 3/2/2023 0636  Last data filed at 3/1/2023 1758  Gross per 24 hour   Intake 370 ml   Output 1710 ml   Net -1340 ml         Laboratory  Recent Labs     03/01/23 0522 03/02/23 0217   WBC 9.7 8.9   RBC 3.84* 3.72*   HEMOGLOBIN 9.8* 9.4*   HEMATOCRIT 30.5* 29.1*   MCV 79.4* 78.2*   MCH 25.5* 25.3*   MCHC 32.1* 32.3*   RDW 50.8* 50.2*   PLATELETCT 338 342   MPV 10.5 10.9       Recent Labs     03/01/23 0522 03/02/23 0217   SODIUM 141 139   POTASSIUM 2.9* 3.5*   CHLORIDE 106 106   CO2 22 23   GLUCOSE 113* 119*   BUN 9 10   CREATININE 0.78 0.84   CALCIUM 8.5 8.8                       Imaging  CT-ABDOMEN-PELVIS WITH   Final Result      1.  Marked interval decrease in size of large right psoas, posterior pararenal and subcutaneous right flank abscess. There is percutaneous pigtail drainage catheter in place. There is residual abscess in the subcutaneous tissues measuring about 3.9 x 4.7    cm.   2.  Trace pleural effusions.   3.  Splenomegaly.   4.  Nonobstructing left renal stones.   5.  Limited evaluation for bowel wall thickening due to decompression. There could be sigmoid colon wall thickening and some scattered fluid within the colon.  Correlate for colitis. No bowel obstruction.   6.  Mesenteric and body wall edema.      CT-DRAIN-RETROPERITONEAL   Final Result      1.  CT guided placement of a percutaneous drainage catheter in a right psoas fluid collection.   2.  The current plan is to obtain a follow-up CT scan in 5-7 days.      US-EXTREMITY VENOUS LOWER BILAT   Final Result      OUTSIDE IMAGES-CT ABDOMEN /PELVIS   Final Result      [id number] VIEW   Final Result      Loops of bowel and colon are somewhat indistinct, possibly related to technique. Appearance appears overall somewhat similar to outside facility CT abdomen pelvis from 2/23/2023. If symptoms have changed from recent CT, CT evaluation is recommended.             Assessment/Plan  * Psoas abscess (HCC)- (present on admission)  Assessment & Plan  Presented to [facility] with Right flank pain  CT demonstrated psoas abscess prompting transfer to [facility] for IR  General surgery consulted and s/o, recommended IR-guided drainage  IR-guided drainage 2/24/23, Repeat CT 2/28/23  RADHA output < 50 cc, requested RN flushes BID and record output  Abscess culture +Citrobacter koseri, pan-susceptible  Continue zosyn for now  ID consulted for antibiotic planning after source control  HIV negative    Drain discontinued 3/1  Repeat CT on 3/7.    Urinary retention  Assessment & Plan  Likely due to oxybutynin - discontinued  No UA from prior to haq placement  Zosyn expected to cover most urine pathogens - culture deferred  Trial haq discontinuation prior to discharge, if unsuccessful will refer to urology  Hold off on voiding trial for now as patient still having significant abdominal pain and diarrhea    External hemorrhoids  Assessment & Plan  Bowel protocol - senna QHS PRN, miralax PRN    Other dysphagia- (present on admission)  Assessment & Plan  Resolved  SLP evaluation  Modified barium swallow deferred per SLP recommendation    Retroperitoneal mass- (present on admission)  Assessment & Plan  Underwent  biopsy of Left renal pole mass extending into RP, it was not indicative of malignancy  Follow up with Oncologist Dr. Campos for further diagnostic evaluation after discharge    Hypokalemia- (present on admission)  Assessment & Plan  Resolved  Likely due to inadequate PO intake from EtOH use DO  Mg WNL  Kdur 40 daily  Repeat AM BMP    Hyponatremia- (present on admission)  Assessment & Plan  Mild, recurrent  Likely due to poor PO intake from EtOH use DO  Repeat AM BMP    Microcytic anemia- (present on admission)  Assessment & Plan  Follows with Heme-Onc Dr. Campos  Elevated ferritin indicative of AOCD  Transfuse for Hgb <7    Alcohol withdrawal syndrome, with delirium (HCC)- (present on admission)  Assessment & Plan  Resolved  Reports >2 beers daily, most recent 2/22/23  Developed diaphoresis, delirium, tremor, tachycardia on HD#2  CIWA-Lorazepam protocol  Empiric MVN + B12 + Folate + Thiamine  Will discuss MAT / AA prior to discharge    Leg swelling- (present on admission)  Assessment & Plan  BLE US negative for DVT  TTE deferred    Acute encephalopathy- (present on admission)  Assessment & Plan  Resolved  Likely multifactorial including underlying abscess and EtOH withdrawal - see separate plans  Nonfocal, CTH deferred    Sepsis with encephalopathy without septic shock (HCC)- (present on admission)  Assessment & Plan  This is Sepsis Present on admission  SIRS criteria identified on my evaluation include: Fever, with temperature greater than 101 deg F, Tachypnea, with respirations greater than 20 per minute and Leukocytosis, with WBC greater than 12,000  Source is right psoas abscess  Sepsis protocol initiated  Fluid resuscitation ordered per protocol  Crystalloid Fluid Administration: Fluid resuscitation ordered per standard protocol - 30 mL/kg per current or ideal body weight  IV antibiotics as appropriate for source of sepsis  Reassessment: I have reassessed the patient's hemodynamic status    BCx 2/23:  NGTD  Abscess Cx 2/24: +Citrobacter koseri  Continue zosyn for intra-abdominal abscess  Leukocytosis improving  ID consulted for antibiotic planning after source control    Gastroesophageal reflux disease with esophagitis- (present on admission)  Assessment & Plan  Likely exacerbated by EtOH use DO  Continue omeprazole    OAB (overactive bladder)- (present on admission)  Assessment & Plan  Discontinued oxybutynin due to urinary retention    Moderate episode of recurrent major depressive disorder (HCC)- (present on admission)  Assessment & Plan  Continue sertraline       VTE prophylaxis: SCDs/TEDs and pharmacologic prophylaxis contraindicated due to psoas abscess drain 2/24    I have performed a physical exam and reviewed and updated ROS and Plan today (3/2/2023). In review of yesterday's note (3/1/2023), there are no changes except as documented above.

## 2023-03-02 NOTE — PROGRESS NOTES
Infectious Disease Progress Note    Author: Leela Pop M.D. Date & Time of service: 3/2/2023  11:45 AM    Chief Complaint:  Psoas abscess    Interval History:   72 y.o. female admitted 2023 as a transfer for worsening right-sided flank pain due to mass    AF abd pain controlled with oxy.  Mult fecal incontinence episodes yesterday-none today  3/1 AF WBC 9.7 creat 0.78 continued abd pain/cramping  3/2 AF WBC 8.9 no new complaints    Labs Reviewed and Medications Reviewed.    Review of Systems:  Review of Systems   Constitutional:  Positive for malaise/fatigue. Negative for fever.   Gastrointestinal:  Positive for abdominal pain.        Loose stools   Neurological:  Positive for tremors.     Hemodynamics:  Temp (24hrs), Av.8 °C (98.3 °F), Min:36.3 °C (97.4 °F), Max:37.3 °C (99.2 °F)  Temperature: 37.3 °C (99.2 °F)  Pulse  Av  Min: 67  Max: 112   Blood Pressure : 123/86       Physical Exam:  Physical Exam  Vitals and nursing note reviewed.   Constitutional:       General: She is not in acute distress.     Appearance: She is ill-appearing. She is not toxic-appearing.   Cardiovascular:      Rate and Rhythm: Normal rate.   Pulmonary:      Effort: Pulmonary effort is normal. No respiratory distress.   Abdominal:      Tenderness: There is abdominal tenderness. There is no guarding.      Comments: drain   Skin:     Coloration: Skin is pale. Skin is not jaundiced.      Findings: Bruising present.   Neurological:      Mental Status: She is alert.       Meds:    Current Facility-Administered Medications:     potassium bicarbonate    magnesium oxide    0.9 % NaCl with KCl 20 mEq 1,000 mL    GI Cocktail (hyoscyamine-lidocaine-Maalox)    oxyCODONE immediate-release **OR** oxyCODONE immediate-release **OR** morphine injection    enoxaparin (LOVENOX) injection    QUEtiapine    melatonin    omeprazole    acetaminophen    Notify provider if pain remains uncontrolled **AND** Use the Numeric Rating Scale  (NRS), Flores-Bustillos Faces (WBF), or FLACC on regular floors and Critical-Care Pain Observation Tool (CPOT) on ICUs/Trauma to assess pain **AND** Pulse Ox **AND** Pharmacy Consult Request **AND** If patient difficult to arouse and/or has respiratory depression (respiratory rate of 10 or less), stop any opiates that are currently infusing and call a Rapid Response.    ondansetron    ondansetron    sertraline    [COMPLETED] piperacillin-tazobactam **AND** piperacillin-tazobactam    Labs:  Recent Labs     03/01/23 0522 03/02/23 0217   WBC 9.7 8.9   RBC 3.84* 3.72*   HEMOGLOBIN 9.8* 9.4*   HEMATOCRIT 30.5* 29.1*   MCV 79.4* 78.2*   MCH 25.5* 25.3*   RDW 50.8* 50.2*   PLATELETCT 338 342   MPV 10.5 10.9   NEUTSPOLYS 74.40* 73.50*   LYMPHOCYTES 15.80* 16.40*   MONOCYTES 5.60 5.40   EOSINOPHILS 2.50 2.80   BASOPHILS 0.80 0.90       Recent Labs     03/01/23 0522 03/02/23 0217   SODIUM 141 139   POTASSIUM 2.9* 3.5*   CHLORIDE 106 106   CO2 22 23   GLUCOSE 113* 119*   BUN 9 10       Recent Labs     03/01/23 0522 03/02/23 0217   CREATININE 0.78 0.84         Imaging:  CT-ABDOMEN-PELVIS WITH    Result Date: 2/28/2023 2/28/2023 9:33 AM HISTORY/REASON FOR EXAM:  Sepsis; Known psoas abscess s/p drain. TECHNIQUE/EXAM DESCRIPTION:   CT scan of the abdomen and pelvis with contrast. Contrast-enhanced helical scanning was obtained from the diaphragmatic domes through the pubic symphysis following the bolus administration of nonionic contrast without complication. 100 mL of Omnipaque 350 nonionic contrast was administered without complication. Low dose optimization technique was utilized for this CT exam including automated exposure control and adjustment of the mA and/or kV according to patient size. COMPARISON: CT abdomen and pelvis from outside facility 2/23/2023 FINDINGS: Lower Chest: Trace pleural effusions and small pericardial effusion. Liver: Normal. Spleen: Enlarged measuring 15 cm craniocaudal. Pancreas: Unremarkable.  Gallbladder: Cholelithiasis. Biliary: Nondilated. Adrenal glands: Normal. Kidneys: There are small renal cysts seen. There are several tiny subcentimeter hypodense lesions which are too small to accurately characterize, likely additional tiny cysts. There are nonobstructing left renal stones. No significant hydronephrosis. Bowel: Suboptimal evaluation for colonic wall thickening due to areas of bowel decompression. There could be some sigmoid and additional scattered colonic wall thickening. Cannot exclude colitis. No small bowel obstruction. There is scattered colonic diverticulosis. Lymph nodes: No adenopathy. Vasculature: Scattered atherosclerosis. Peritoneum: Diffuse infiltration of the mesenteric fat, edema versus inflammation. Musculoskeletal: There is marked interval decrease in size of previously seen large multiloculated right psoas, posterior pararenal and posterior subcutaneous flank abscess. There is a percutaneous pigtail drainage catheter in place. There is residual abscess in the subcutaneous tissues of the right flank measuring about 3.9 x 4.7 cm.. Right femoral ORIF. Old posterior left rib fractures Pelvis: Bladder is decompressed by Ferrera catheter.     1.  Marked interval decrease in size of large right psoas, posterior pararenal and subcutaneous right flank abscess. There is percutaneous pigtail drainage catheter in place. There is residual abscess in the subcutaneous tissues measuring about 3.9 x 4.7  cm. 2.  Trace pleural effusions. 3.  Splenomegaly. 4.  Nonobstructing left renal stones. 5.  Limited evaluation for bowel wall thickening due to decompression. There could be sigmoid colon wall thickening and some scattered fluid within the colon. Correlate for colitis. No bowel obstruction. 6.  Mesenteric and body wall edema.    CT-DRAIN-RETROPERITONEAL    Result Date: 2/24/2023 2/24/2023 4:17 PM HISTORY/REASON FOR EXAM: Right psoas abscess TECHNIQUE/EXAM DESCRIPTION: Right retroperitoneal abscess  drainage with CT guidance. Low dose optimization technique was utilized for this CT exam including automated exposure control and adjustment of the mA and/or kV according to patient size. PROCEDURE: Informed consent was obtained from the patient's  via telephone consent. Moderate sedation with Fentanyl and Versed was administered during the procedure with appropriate continuous patient monitoring by the radiology nurse. Intraservice duration:  20 minutes Localizing CT images were obtained with the patient in prone position. The skin was prepped with ChloraPrep and draped in a sterile fashion. Following local anesthesia with 1% Lidocaine, a 17 G guiding needle was placed and needle path confirmed with CT. An Amplatz guidewire was placed and following serial tract dilatation, a 12 Cypriot pigtail locking catheter was placed. A specimen was collected and submitted for culture and sensitivity and Gram stain. Total of 125 mL purulent fluid was drained. The catheter was secured to the skin and connected to suction bulb drainage. Final CT images were obtained documenting catheter position. The patient tolerated the procedure well with no evidence of complication. COMPARISON: None available. FINDINGS: The final CT images show satisfactory catheter position within the target collection.     1.  CT guided placement of a percutaneous drainage catheter in a right psoas fluid collection. 2.  The current plan is to obtain a follow-up CT scan in 5-7 days.    QA-ATHADLQ-6 VIEW    Result Date: 2/24/2023 2/24/2023 12:58 AM HISTORY/REASON FOR EXAM:  Abdominal Pain. TECHNIQUE/EXAM DESCRIPTION AND NUMBER OF VIEWS:   1 views of the abdomen. COMPARISON: Outside facility CT abdomen pelvis FINDINGS: Loops of bowel and colon are indistinct. No free intraperitoneal air is identified though evaluation is limited on supine imaging.  No pathologic calcification is noted. Osteopenia. Right femoral hardware.     Loops of bowel and colon are  somewhat indistinct, possibly related to technique. Appearance appears overall somewhat similar to outside facility CT abdomen pelvis from 2023. If symptoms have changed from recent CT, CT evaluation is recommended.    US-EXTREMITY VENOUS LOWER BILAT    Result Date: 2023   Vascular Laboratory  CONCLUSIONS  No evidence of deep venous thrombosis bilaterally.  AUBREY BEAN  Exam Date:     2023 12:21  Room #:     Inpatient  Priority:     Routine  Ht (in):     65      Wt (lb):     100  Ordering Physician:        JIMI VILLA  Referring Physician:       DAISY Chen  Sonographer:               Reed Navarro                             RDCS, RVT  Study Type:                Complete Bilateral  Technical Quality:         Adequate  Age:    72    Gender:     F  MRN:    [id number]  :    1950      BSA:    1.47  Indications:     Swelling of Limb  CPT Codes:       15124  ICD Codes:       729.81  History:         Bilateral lower extremity swelling  Limitations:  PROCEDURES:  Bilateral lower extremity venous duplex imaging.  The following venous structures were evaluated: common femoral, deep  femoral, proximal great saphenous, femoral, popliteal  veins.  Serial compression, color, and spectral Doppler flow evaluations were  performed.  FINDINGS:  Bilateral lower extremities.  No evidence of deep venous thrombosis bilaterally.  Jacobo Zeng  (Electronically Signed)  Final Date:      2023                   14:46    CT ABDOMEN-PELVIS WITH IV CONTRAST    Result Date: 2023  HISTORY/REASON FOR EXAM:  renal mass TECHNIQUE/EXAM DESCRIPTION: CT scan of the abdomen and pelvis with contrast.  Both automated exposure control and Automated adjustment of tube current based on patient size are utilized.  2023 6:20 PM. Total DLP: 255 mGy*cm COMPARISON: 1/10/2023 and 2023 FINDINGS: Lung bases: Atelectasis versus scar is now present within both lung bases. The masslike consolidation  in the left base is markedly improved. Solid abdominal organs: There is marked interval change of the complex right renal lesion as compared to the previous. The biopsy images dated 2/6/2023 demonstrated significant progression of the mass versus interval hemorrhage. On today's study, the large majority of the mass appears cystic with multifocal septations but there is significant direct invasion of the adjacent soft tissues with the tumor now abutting the skin surface. The maximal dimension of the lesion on today's study is approximately  11.4 cm x 5.9 cm on the axial imaging. Mass is displacing the kidney anteriorly. Is impossible to define whether the lesion originates from the kidney or the right adrenal gland. On the coronal images, this lesion measures approximately 14.3 cm and tracks along the psoas muscle. Given the marked interval change from the 1/10/2023 study 22/6/2023 study and a history of no interval procedure, tumoral hemorrhage at that time would be favored. Progressive hemorrhage was secondary abscess formation be a  strong consideration. Neoplasm remains a strong consideration. The abnormal soft tissue noted lateral to the left kidney abutting the hemidiaphragm on the prior examination has completely resolved which suggest a contralateral hemorrhage and interval resolution. Focal renal cystic lesions within the cortex appear benign. Small bilateral renal clipping system stones are again noted. Bowel, pelvis and osseous structures: Remainder the study is essentially stable produce extensive large bowel stool current exam suggesting fecal impaction. Osseous structures are moderately degenerative.     1.  Left-sided lung base and perirenal soft tissue mass of nearly completely resolved. This may suggest a previous focal hemorrhage and left basilar atelectasis or infection but in any case, a benign etiology is suggested. 2.  There is extensive progression of the mixed cystic and solid lesion involving  the retroperitoneum on the right. This mass now extends to near the skin surface with a maximal dimension of 11.4 cm x 14.4 cm. Given a larger cystic component extending along the psoas muscle and the previous findings from the biopsy, tumoral hemorrhage between the January 10 study and February 6 study is favored with progressive hemorrhage from February 6 to today. Blood products may now have become infected given the large cystic components on the current exam. Underlying tumor as indicated on the January 10 study remains strong possibility.   CT-FNA BIOPSY W/ CT GUIDANCE    Result Date: 2/6/2023  HISTORY/REASON FOR EXAM:  retroperitoneal mass. TECHNIQUE/EXAM DESCRIPTION: CT guided needle placement.  CT guided right renal mass/retroperitoneal mass biopsy.  FINDINGS: Large heterogeneous mass arising from the right kidney and right psoas muscle. Interval progression from prior examination     Technically successful CT-guided biopsy of right renal mass/right retroperitoneal mass Nickolas Baker MD      Micro:  Results       Procedure Component Value Units Date/Time    C Diff by PCR rflx Toxin [[id number]] Collected: 02/28/23 1400    Order Status: Completed Specimen: Stool Updated: 02/28/23 1921     C Diff by PCR Negative     Comment: C. difficile NOT detected by PCR.  Treatment not indicated per guidelines.  Repeat testing not indicated within 7 days.          027-NAP1-BI Presumptive Negative     Comment: Presumptive 027/NAP1/BI target DNA sequences are NOT DETECTED.       Narrative:      Special Contact Isolation  Collected By: [id number] SUZANNE SPARKS  Does this patient have risk factors for C-diff?->Yes    CULTURE WOUND W/ GRAM STAIN [[id number]]  (Abnormal)  (Susceptibility) Collected: 02/24/23 1700    Order Status: Completed Specimen: Wound Updated: 02/27/23 1338     Significant Indicator POS     Source WND     Site Right Psoas     Culture Result -     Gram Stain Result Moderate WBCs.  No organisms seen.       Culture  Result Citrobacter koseri  Moderate growth      Susceptibility       Citrobacter koseri (1)       Antibiotic Interpretation Microscan   Method Status    Ampicillin Resistant >16 mcg/mL ROBERTH Final    Ceftriaxone Sensitive <=1 mcg/mL ROBERTH Final    Cefazolin Sensitive <=2 mcg/mL ROBERTH Final    Ciprofloxacin Sensitive <=0.25 mcg/mL ROBERTH Final    Cefepime Sensitive <=2 mcg/mL ROBERTH Final    Cefuroxime Sensitive <=4 mcg/mL ROBERTH Final    Ampicillin/sulbactam Sensitive <=4/2 mcg/mL ROBERTH Final    Ertapenem Sensitive <=0.5 mcg/mL ROBERTH Final    Tobramycin Sensitive <=2 mcg/mL ROBERTH Final    Gentamicin Sensitive <=2 mcg/mL ROBERTH Final    Minocycline Sensitive <=4 mcg/mL ROBERTH Final    Moxifloxacin Sensitive <=2 mcg/mL ROBERTH Final    Pip/Tazobactam Sensitive <=8 mcg/mL ROBERTH Final    Trimeth/Sulfa Sensitive <=0.5/9.5 mcg/mL ROBERTH Final    Tigecycline Sensitive <=2 mcg/mL ROBERTH Final                       Anaerobic Culture [[id number]] Collected: 02/24/23 1700    Order Status: Completed Specimen: Wound Updated: 02/27/23 1338     Significant Indicator NEG     Source WND     Site Right Psoas     Culture Result No Anaerobes isolated.    GRAM STAIN [[id number]] Collected: 02/24/23 1700    Order Status: Completed Specimen: Wound Updated: 02/25/23 0528     Significant Indicator .     Source WND     Site Right Psoas     Gram Stain Result Moderate WBCs.  No organisms seen.      FLUID CULTURE W/GRAM STAIN [[id number]]     Order Status: No result Specimen: Body Fluid from Peritoneal Fluid     Aerobic/Anaerobic Culture (Surgery) [[id number]]     Order Status: No result Specimen: Other from Peritoneal Fluid     URINALYSIS [[id number]]     Order Status: No result Specimen: Urine             Assessment:  Active Hospital Problems    Diagnosis     *Psoas abscess (HCC) [K68.12]     Urinary retention [R33.9]     External hemorrhoids [K64.4]     Other dysphagia [R13.19]     Alcohol withdrawal syndrome, with delirium (HCC) [F10.931]     Retroperitoneal mass [R19.00]     Sepsis  with encephalopathy without septic shock (HCC) [A41.9, R65.20, G93.40]     Acute encephalopathy [G93.40]     Leg swelling [M79.89]     Gastroesophageal reflux disease with esophagitis [K21.00]     OAB (overactive bladder) [N32.81]     Moderate episode of recurrent major depressive disorder (HCC) [F33.1]    Psoas abscess  -CT 2/28    1.  Marked interval decrease in size of large right psoas, posterior pararenal and subcutaneous right flank abscess. There is percutaneous pigtail drainage catheter in place. There is residual abscess in the subcutaneous tissues measuring about 3.9 x 4.7 cm.  2.  Trace pleural effusions.  3.  Splenomegaly.  4.  Nonobstructing left renal stones.  5.  Limited evaluation for bowel wall thickening due to decompression. There could be sigmoid colon wall thickening and some scattered fluid within the colon. Correlate for colitis. No bowel obstruction.  6.  Mesenteric and body wall edema.  Urinary retention  Leukocytosis  EtOH withdrawal-CIWA        PLAN:   S/p IR drainage 2/24-multiloculated  CT 2/28 much improved-still need some additional decrease in size of abscess prior to switch to PO  Repeat CT scan one week to ensure continued resolution  Anticipate 1 more weeks IV followed by PO until abscess resolved-can use Bactrim or Augmentin or minocycline     FU ID clinic 1 week after discharge-OK to [facility] [employer] APRN  Will sign off-please reconsult if CT not improved  Midline-likely no. + oral options

## 2023-03-03 LAB
ANION GAP SERPL CALC-SCNC: 9 MMOL/L (ref 7–16)
BUN SERPL-MCNC: 9 MG/DL (ref 8–22)
CALCIUM SERPL-MCNC: 8.7 MG/DL (ref 8.5–10.5)
CHLORIDE SERPL-SCNC: 105 MMOL/L (ref 96–112)
CO2 SERPL-SCNC: 25 MMOL/L (ref 20–33)
CREAT SERPL-MCNC: 0.88 MG/DL (ref 0.5–1.4)
ERYTHROCYTE [DISTWIDTH] IN BLOOD BY AUTOMATED COUNT: 51.2 FL (ref 35.9–50)
GFR SERPLBLD CREATININE-BSD FMLA CKD-EPI: 70 ML/MIN/1.73 M 2
GLUCOSE SERPL-MCNC: 108 MG/DL (ref 65–99)
HCT VFR BLD AUTO: 29.7 % (ref 37–47)
HGB BLD-MCNC: 9.5 G/DL (ref 12–16)
MAGNESIUM SERPL-MCNC: 1.9 MG/DL (ref 1.5–2.5)
MCH RBC QN AUTO: 25.3 PG (ref 27–33)
MCHC RBC AUTO-ENTMCNC: 32 G/DL (ref 33.6–35)
MCV RBC AUTO: 79.2 FL (ref 81.4–97.8)
PHOSPHATE SERPL-MCNC: 3 MG/DL (ref 2.5–4.5)
PLATELET # BLD AUTO: 334 K/UL (ref 164–446)
PMV BLD AUTO: 11.6 FL (ref 9–12.9)
POTASSIUM SERPL-SCNC: 3.7 MMOL/L (ref 3.6–5.5)
RBC # BLD AUTO: 3.75 M/UL (ref 4.2–5.4)
SODIUM SERPL-SCNC: 139 MMOL/L (ref 135–145)
WBC # BLD AUTO: 6.7 K/UL (ref 4.8–10.8)

## 2023-03-03 PROCEDURE — 700111 HCHG RX REV CODE 636 W/ 250 OVERRIDE (IP): Performed by: INTERNAL MEDICINE

## 2023-03-03 PROCEDURE — 80048 BASIC METABOLIC PNL TOTAL CA: CPT

## 2023-03-03 PROCEDURE — 36415 COLL VENOUS BLD VENIPUNCTURE: CPT

## 2023-03-03 PROCEDURE — A9270 NON-COVERED ITEM OR SERVICE: HCPCS | Performed by: INTERNAL MEDICINE

## 2023-03-03 PROCEDURE — 700105 HCHG RX REV CODE 258: Performed by: INTERNAL MEDICINE

## 2023-03-03 PROCEDURE — A9270 NON-COVERED ITEM OR SERVICE: HCPCS | Performed by: HOSPITALIST

## 2023-03-03 PROCEDURE — 700102 HCHG RX REV CODE 250 W/ 637 OVERRIDE(OP): Performed by: STUDENT IN AN ORGANIZED HEALTH CARE EDUCATION/TRAINING PROGRAM

## 2023-03-03 PROCEDURE — A9270 NON-COVERED ITEM OR SERVICE: HCPCS | Performed by: STUDENT IN AN ORGANIZED HEALTH CARE EDUCATION/TRAINING PROGRAM

## 2023-03-03 PROCEDURE — 770004 HCHG ROOM/CARE - ONCOLOGY PRIVATE *

## 2023-03-03 PROCEDURE — 92526 ORAL FUNCTION THERAPY: CPT

## 2023-03-03 PROCEDURE — 700102 HCHG RX REV CODE 250 W/ 637 OVERRIDE(OP): Performed by: INTERNAL MEDICINE

## 2023-03-03 PROCEDURE — 83735 ASSAY OF MAGNESIUM: CPT

## 2023-03-03 PROCEDURE — 700102 HCHG RX REV CODE 250 W/ 637 OVERRIDE(OP): Performed by: HOSPITALIST

## 2023-03-03 PROCEDURE — 85027 COMPLETE CBC AUTOMATED: CPT

## 2023-03-03 PROCEDURE — 99233 SBSQ HOSP IP/OBS HIGH 50: CPT | Performed by: INTERNAL MEDICINE

## 2023-03-03 PROCEDURE — 84100 ASSAY OF PHOSPHORUS: CPT

## 2023-03-03 PROCEDURE — 700101 HCHG RX REV CODE 250: Performed by: INTERNAL MEDICINE

## 2023-03-03 RX ORDER — LANOLIN ALCOHOL/MO/W.PET/CERES
400 CREAM (GRAM) TOPICAL 2 TIMES DAILY
Status: COMPLETED | OUTPATIENT
Start: 2023-03-03 | End: 2023-03-03

## 2023-03-03 RX ADMIN — DIBASIC SODIUM PHOSPHATE, MONOBASIC POTASSIUM PHOSPHATE AND MONOBASIC SODIUM PHOSPHATE 500 MG: 852; 155; 130 TABLET ORAL at 09:36

## 2023-03-03 RX ADMIN — LIDOCAINE HYDROCHLORIDE 30 ML: 20 SOLUTION OROPHARYNGEAL at 14:48

## 2023-03-03 RX ADMIN — Medication 5 MG: at 21:04

## 2023-03-03 RX ADMIN — QUETIAPINE FUMARATE 50 MG: 25 TABLET ORAL at 21:04

## 2023-03-03 RX ADMIN — OMEPRAZOLE 40 MG: 20 CAPSULE, DELAYED RELEASE ORAL at 04:53

## 2023-03-03 RX ADMIN — ENOXAPARIN SODIUM 40 MG: 40 INJECTION SUBCUTANEOUS at 17:42

## 2023-03-03 RX ADMIN — PIPERACILLIN AND TAZOBACTAM 4.5 G: 4; .5 INJECTION, POWDER, LYOPHILIZED, FOR SOLUTION INTRAVENOUS; PARENTERAL at 08:20

## 2023-03-03 RX ADMIN — MAGNESIUM GLUCONATE 500 MG ORAL TABLET 400 MG: 500 TABLET ORAL at 17:42

## 2023-03-03 RX ADMIN — PIPERACILLIN AND TAZOBACTAM 4.5 G: 4; .5 INJECTION, POWDER, LYOPHILIZED, FOR SOLUTION INTRAVENOUS; PARENTERAL at 00:57

## 2023-03-03 RX ADMIN — LIDOCAINE HYDROCHLORIDE 30 ML: 20 SOLUTION OROPHARYNGEAL at 21:07

## 2023-03-03 RX ADMIN — SERTRALINE 100 MG: 100 TABLET, FILM COATED ORAL at 04:53

## 2023-03-03 RX ADMIN — LIDOCAINE HYDROCHLORIDE 30 ML: 20 SOLUTION OROPHARYNGEAL at 04:53

## 2023-03-03 RX ADMIN — PIPERACILLIN AND TAZOBACTAM 4.5 G: 4; .5 INJECTION, POWDER, LYOPHILIZED, FOR SOLUTION INTRAVENOUS; PARENTERAL at 16:15

## 2023-03-03 RX ADMIN — MAGNESIUM GLUCONATE 500 MG ORAL TABLET 400 MG: 500 TABLET ORAL at 08:00

## 2023-03-03 RX ADMIN — POTASSIUM CHLORIDE AND SODIUM CHLORIDE: 900; 150 INJECTION, SOLUTION INTRAVENOUS at 15:30

## 2023-03-03 RX ADMIN — SERTRALINE 100 MG: 100 TABLET, FILM COATED ORAL at 17:42

## 2023-03-03 RX ADMIN — LIDOCAINE HYDROCHLORIDE 30 ML: 20 SOLUTION OROPHARYNGEAL at 17:41

## 2023-03-03 ASSESSMENT — ENCOUNTER SYMPTOMS
DIARRHEA: 0
CONSTIPATION: 1
SHORTNESS OF BREATH: 0
BRUISES/BLEEDS EASILY: 0
NERVOUS/ANXIOUS: 0
FLANK PAIN: 1
HEMOPTYSIS: 0
BACK PAIN: 0
ABDOMINAL PAIN: 1
DEPRESSION: 1
SORE THROAT: 0
CHILLS: 0
FEVER: 0
NAUSEA: 0
VOMITING: 0
HEADACHES: 0
BLOOD IN STOOL: 0
MYALGIAS: 0
SINUS PAIN: 0
EYE PAIN: 0
NECK PAIN: 0

## 2023-03-03 ASSESSMENT — PAIN DESCRIPTION - PAIN TYPE: TYPE: ACUTE PAIN

## 2023-03-03 NOTE — PROGRESS NOTES
Hospital Medicine Daily Progress Note    Date of Service  3/3/2023    Chief Complaint  Mena Campos is a 72 y.o. female with GERD, MDD, EtOH use DO, and Left Renal / RP mass followed by oncologist Dr. Campos admitted 2/23/2023 with Right psoas abscess    Hospital Course  No notes on file    Mena Campos is a 72 y.o. female who presented 2/23/2023 with past medical history of repeated urine infection who comes into the hospital for right-sided flank pain.  This has been occurring for the past 2 months.  She was found to have right-sided kidney mass on January 10.  She had a biopsy of this mass on February 6.  She went to visit an oncologist today Dr. Campos who saw the biopsy results and noticed it being an abscess.  He sent her to the emergency room right away.  Repeat CT scan was completed found a psoas abscess.  General surgery recommended IR drainage.  Status post IR drain placed 2/24.  Cultures grew Citrobacter koseri.  ID was consulted.  Patient was started on Zosyn.    Repeat CT on 2/28 showed increase in size of psoas abscess measuring 3.9 x 4.7 cm with pigtail drainage catheter in place, trace blood fusions, splenomegaly, nonobstructive left renal stones, mesenteric and body wall edema.  C. difficile is negative.    Discussed with interventional radiology Dr. Sandoval.  Psoas abscess was multiloculated.  Drain has minimal output and was discontinued on 3/1.  Remaining abscess is loculated and not connected to the drain, it is currently too small to place a new drain and will hopefully resolve with antibiotics.    Hospital course was also complicated by alcohol withdrawal.    Interval Problem Update  Patient was seen and examined at bedside.  I have personally reviewed and interpreted vitals, labs, and imaging.    2/28.  Afebrile.  Slightly hypertensive.  On 0-1 L nasal cannula.  Leukocytosis trending down to 11.7.  Repeat potassium, magnesium, phosphorus.  Denies fever, chills, chest pains,  shortness of breath.  Has been having abdominal cramping and loose bowel movements.  Screen for C. difficile.  Continue Zosyn for now.  De-escalated back to clear liquid diet as patient having severe abdominal pain.  3/1.  Afebrile.  Slightly hypertensive.  On room air.  Leukocytosis resolved.  Replete K, Mg.  Start maintenance fluids denies fevers, chills, chest pains, shortness of breath.  Patient still have abdominal cramping but this is improved from yesterday.  Tolerating liquid diet.  Discussed TPN with patient and concern for malnutrition versus risks of TPN.  Patient would like to hold off on TPN and continue to try oral diet for now.  DC psoas drain today.  ID recommends 1 more week of IV antibiotics followed by oral.  3/2.  Afebrile.  Stable vitals.  On room air.  Replete K, mag, phos.  Denies fevers, chills, chest pains, shortness of breath.  Had persistent abdominal cramping and diarrhea yesterday.  Reports he does feel better today but she has not tried eating yet.  States she feels better this morning and would like to try clear liquids again.  Would like to hold off on TPN as long as possible.  Discussed with ID.  Plan for repeat CT abdomen pelvis on 3/7.  Continue IV Zosyn until then.  If abscess is resolved can switch to oral antibiotics.  3/3.  Afebrile.  Stable vitals.  On room air.  Replete mag, K.  Denies fever, chills, chest pains, shortness of breath.  Patient still did not eat well yesterday with persistent abdominal cramping and diarrhea.  Did do better with clear liquid diet this morning and seems to be making progress in the bilateral.  Discussed with patient and dietary.  We will plan to advance to full liquids tomorrow if patient can tolerate clears better.  Patient considering TPN if she cannot tolerate diet.  Repeat CT on 3/7 to evaluate psoas abscess, colitis.  Continue Zosyn    I have discussed this patient's plan of care and discharge plan at IDT rounds today with Case Management,  Nursing, Nursing leadership, and other members of the IDT team.    Consultants/Specialty  general surgery, infectious disease, and interventional radiology    Code Status  Full Code    Disposition  Patient is not medically cleared for discharge.   Anticipate discharge to to home with organized home healthcare and close outpatient follow-up.  I have placed the appropriate orders for post-discharge needs.    Review of Systems  Review of Systems   Constitutional:  Positive for malaise/fatigue. Negative for chills and fever.   HENT:  Negative for ear pain, nosebleeds, sinus pain and sore throat.    Eyes:  Negative for pain.   Respiratory:  Negative for hemoptysis and shortness of breath.    Cardiovascular:  Negative for chest pain and leg swelling.   Gastrointestinal:  Positive for abdominal pain and constipation. Negative for blood in stool, diarrhea, melena, nausea and vomiting.   Genitourinary:  Positive for flank pain. Negative for dysuria and hematuria.   Musculoskeletal:  Negative for back pain, joint pain, myalgias and neck pain.   Neurological:  Negative for headaches.   Endo/Heme/Allergies:  Does not bruise/bleed easily.   Psychiatric/Behavioral:  Positive for depression. The patient is not nervous/anxious.       Physical Exam  Temp:  [36.7 °C (98.1 °F)-37.7 °C (99.9 °F)] 36.8 °C (98.2 °F)  Pulse:  [76-89] 76  Resp:  [16-18] 16  BP: (119-139)/(57-86) 139/57  SpO2:  [94 %-98 %] 98 %    Physical Exam  Vitals and nursing note reviewed. Exam conducted with a chaperone present.   Constitutional:       Appearance: She is cachectic. She is ill-appearing (Chronically).   HENT:      Head: Normocephalic and atraumatic.      Comments: Bitemporal wasting     Right Ear: External ear normal.      Left Ear: External ear normal.      Nose: Nose normal.      Mouth/Throat:      Mouth: Mucous membranes are dry.   Eyes:      Extraocular Movements: Extraocular movements intact.      Conjunctiva/sclera: Conjunctivae normal.    Cardiovascular:      Rate and Rhythm: Normal rate and regular rhythm.      Pulses: Normal pulses.      Heart sounds: Normal heart sounds. No murmur heard.    No friction rub. No gallop.   Pulmonary:      Effort: Pulmonary effort is normal. No respiratory distress.      Breath sounds: Normal breath sounds. No wheezing, rhonchi or rales.   Abdominal:      General: Abdomen is flat. Bowel sounds are normal. There is no distension.      Palpations: Abdomen is soft.      Tenderness: There is abdominal tenderness. There is no guarding or rebound.   Genitourinary:     Comments: +Ferrera, urine yellow / clear  Musculoskeletal:      Cervical back: Neck supple.      Right lower leg: No edema.      Left lower leg: No edema.   Skin:     General: Skin is warm.   Neurological:      General: No focal deficit present.      Mental Status: She is alert and oriented to person, place, and time. Mental status is at baseline.      Cranial Nerves: No cranial nerve deficit.      Motor: No tremor.   Psychiatric:         Attention and Perception: Attention and perception normal.         Mood and Affect: Mood normal. Affect is blunt.         Speech: Speech normal.         Behavior: Behavior normal. Behavior is cooperative.       Fluids    Intake/Output Summary (Last 24 hours) at 3/3/2023 0621  Last data filed at 3/3/2023 0500  Gross per 24 hour   Intake --   Output 3000 ml   Net -3000 ml         Laboratory  Recent Labs     03/01/23 0522 03/02/23 0217 03/03/23 0139   WBC 9.7 8.9 6.7   RBC 3.84* 3.72* 3.75*   HEMOGLOBIN 9.8* 9.4* 9.5*   HEMATOCRIT 30.5* 29.1* 29.7*   MCV 79.4* 78.2* 79.2*   MCH 25.5* 25.3* 25.3*   MCHC 32.1* 32.3* 32.0*   RDW 50.8* 50.2* 51.2*   PLATELETCT 338 342 334   MPV 10.5 10.9 11.6       Recent Labs     03/01/23 0522 03/02/23 0217 03/03/23 0139   SODIUM 141 139 139   POTASSIUM 2.9* 3.5* 3.7   CHLORIDE 106 106 105   CO2 22 23 25   GLUCOSE 113* 119* 108*   BUN 9 10 9   CREATININE 0.78 0.84 0.88   CALCIUM 8.5 8.8 8.7                        Imaging  CT-ABDOMEN-PELVIS WITH   Final Result      1.  Marked interval decrease in size of large right psoas, posterior pararenal and subcutaneous right flank abscess. There is percutaneous pigtail drainage catheter in place. There is residual abscess in the subcutaneous tissues measuring about 3.9 x 4.7    cm.   2.  Trace pleural effusions.   3.  Splenomegaly.   4.  Nonobstructing left renal stones.   5.  Limited evaluation for bowel wall thickening due to decompression. There could be sigmoid colon wall thickening and some scattered fluid within the colon. Correlate for colitis. No bowel obstruction.   6.  Mesenteric and body wall edema.      CT-DRAIN-RETROPERITONEAL   Final Result      1.  CT guided placement of a percutaneous drainage catheter in a right psoas fluid collection.   2.  The current plan is to obtain a follow-up CT scan in 5-7 days.      US-EXTREMITY VENOUS LOWER BILAT   Final Result      OUTSIDE IMAGES-CT ABDOMEN /PELVIS   Final Result      HL-BDDUBLU-2 VIEW   Final Result      Loops of bowel and colon are somewhat indistinct, possibly related to technique. Appearance appears overall somewhat similar to outside facility CT abdomen pelvis from 2/23/2023. If symptoms have changed from recent CT, CT evaluation is recommended.             Assessment/Plan  * Psoas abscess (HCC)- (present on admission)  Assessment & Plan  [place] [facility] [place]  CT demonstrated psoas abscess prompting transfer to [facility] IR  General surgery consulted and s/o, recommended IR-guided drainage  IR-guided drainage 2/24/23, Repeat CT 2/28/23  RADHA output < 50 cc, requested RN flushes BID and record output  Abscess culture +Citrobacter koseri, pan-susceptible  Continue zosyn for now  ID consulted for antibiotic planning after source control  HIV negative    Drain discontinued 3/1  Repeat CT on 3/7.    Urinary retention  Assessment & Plan  Likely due to oxybutynin - discontinued  No UA from  prior to haq placement  Zosyn expected to cover most urine pathogens - culture deferred  Trial haq discontinuation prior to discharge, if unsuccessful will refer to urology  Hold off on voiding trial for now as patient still having significant abdominal pain and diarrhea    External hemorrhoids  Assessment & Plan  Bowel protocol - senna QHS PRN, miralax PRN    Other dysphagia- (present on admission)  Assessment & Plan  Resolved  SLP evaluation  Modified barium swallow deferred per SLP recommendation    Retroperitoneal mass- (present on admission)  Assessment & Plan  Underwent biopsy of Left renal pole mass extending into RP, it was not indicative of malignancy  Follow up with Oncologist Dr. Campos for further diagnostic evaluation after discharge    Hypokalemia- (present on admission)  Assessment & Plan  Resolved  Likely due to inadequate PO intake from EtOH use DO  Mg WNL  Kdur 40 daily  Repeat AM BMP    Hyponatremia- (present on admission)  Assessment & Plan  Mild, recurrent  Likely due to poor PO intake from EtOH use DO  Repeat AM BMP    Microcytic anemia- (present on admission)  Assessment & Plan  Follows with Heme-Onc Dr. Campos  Elevated ferritin indicative of AOCD  Transfuse for Hgb <7    Alcohol withdrawal syndrome, with delirium (HCC)- (present on admission)  Assessment & Plan  Resolved  Reports >2 beers daily, most recent 2/22/23  Developed diaphoresis, delirium, tremor, tachycardia on HD#2  CIWA-Lorazepam protocol  Empiric MVN + B12 + Folate + Thiamine  Will discuss MAT / AA prior to discharge    Leg swelling- (present on admission)  Assessment & Plan  BLE US negative for DVT  TTE deferred    Acute encephalopathy- (present on admission)  Assessment & Plan  Resolved  Likely multifactorial including underlying abscess and EtOH withdrawal - see separate plans  Nonfocal, CTH deferred    Sepsis with encephalopathy without septic shock (HCC)- (present on admission)  Assessment & Plan  This is Sepsis Present  on admission  SIRS criteria identified on my evaluation include: Fever, with temperature greater than 101 deg F, Tachypnea, with respirations greater than 20 per minute and Leukocytosis, with WBC greater than 12,000  Source is right psoas abscess  Sepsis protocol initiated  Fluid resuscitation ordered per protocol  Crystalloid Fluid Administration: Fluid resuscitation ordered per standard protocol - 30 mL/kg per current or ideal body weight  IV antibiotics as appropriate for source of sepsis  Reassessment: I have reassessed the patient's hemodynamic status    BCx 2/23: NGTD  Abscess Cx 2/24: +Citrobacter koseri  Continue zosyn for intra-abdominal abscess  Leukocytosis improving  ID consulted for antibiotic planning after source control    Gastroesophageal reflux disease with esophagitis- (present on admission)  Assessment & Plan  Likely exacerbated by EtOH use DO  Continue omeprazole    OAB (overactive bladder)- (present on admission)  Assessment & Plan  Discontinued oxybutynin due to urinary retention    Moderate episode of recurrent major depressive disorder (HCC)- (present on admission)  Assessment & Plan  Continue sertraline       VTE prophylaxis: SCDs/TEDs and enoxaparin ppx    I have performed a physical exam and reviewed and updated ROS and Plan today (3/3/2023). In review of yesterday's note (3/2/2023), there are no changes except as documented above.

## 2023-03-03 NOTE — THERAPY
"Speech Language Pathology  Daily Treatment     Patient Name: Mena Campos  Age:  72 y.o., Sex:  female  Medical Record #: [id number]  Today's Date: 3/3/2023     Precautions  Precautions: Fall Risk, Swallow Precautions ( See Comments)  Comments: CIWA    Assessment    Patient seen on this date for dysphagia management. Pt initially on regular/thin liquid diet; however, was downgraded to clear liquids on 2/28/2023 due to abdominal cramping, nausea and vomiting. Pt endorsed tolerance of clear liquid diet, no s/sx of aspiration at bedside on this date. Per pt and RD, she is cleared to trial full liquid diet and advance as appropriate. Pt educated on swallow/reflex precautions. Pt verbalized understanding, all questions addressed.     Continue w/ full liquid diet with adherence to safe swallow strategies and reflux precautions.   -will continue to follow pt and advance diet as appropriate  Meds as tolerated  Hold Dx swallow study at this time, complete pending clinical progress.   Diligent oral care         Plan    Continue current treatment plan.    Discharge Recommendations: Anticipate that the patient will have no further speech therapy needs after discharge from the hospital       Objective       03/03/23 1520   Dysphagia    Dysphagia X   Positioning / Behavior Modification Self Monitoring;Modulate Rate or Bite Size   Other Treatments PO snack of clear liquids   Diet / Liquid Recommendation Thin (0)   Nutritional Liquid Intake Rating Scale Non thickened beverages   Nutritional Food Intake Rating Scale Total oral diet of a single consistency   Skilled Intervention Compensatory Strategies;Verbal Cueing   Recommended Route of Medication Administration   Medication Administration  Whole with Liquid Wash   Patient / Family Goals   Patient / Family Goal #1 \"I need water.\"   Goal #1 Outcome Goal met   Short Term Goals   Short Term Goal # 2 Pt will consume a diet of reg/thins with adherence to reflux precautions and no " s/sx of aspiraiton with min cues   Goal Outcome # 2  Goal not met   Short Term Goal # 3 Patient will consume a full liquid diet with no overt s/sx of aspiration with adherence to reflux precautions

## 2023-03-03 NOTE — CARE PLAN
The patient is Stable - Low risk of patient condition declining or worsening    Shift Goals  Clinical Goals: skin care, pain control, rest  Patient Goals: skin care  Family Goals: none present    Progress made toward(s) clinical / shift goals:    Problem: Knowledge Deficit - Standard  Goal: Patient and family/care givers will demonstrate understanding of plan of care, disease process/condition, diagnostic tests and medications  3/2/2023 2246 by Abigail Horn R.N.  Outcome: Progressing  3/2/2023 2245 by CINDY [email or web address].  Outcome: Progressing  Note: Pt educated on plan of care, pt verbalizes understanding.      Problem: Pain - Standard  Goal: Alleviation of pain or a reduction in pain to the patient’s comfort goal  3/2/2023 2246 by Abigail Horn R.N.  Outcome: Progressing  3/2/2023 2245 by CINDY Benavidez.N.  Note: Pain medication given per MAR, pain assessed using 0-10 pain scale.        Patient is not progressing towards the following goals:

## 2023-03-03 NOTE — DIETARY
Nutrition Services: Brief Update    Day 8 of admit.  Mena Campos being followed by Nutrition to optimize intake.     Current Diet: clear liquids - diet downgraded due to abdominal cramping.  Patient has had 7 days of poor nutrition and was malnourished at admit. Per MD note, pt wanted to hold off on TPN and try to eat. PO intake has begun to improve within the last 24 hours with little to no abdominal cramping or diarrhea.  RD met with pt at bedside to address concerns and answer questions regarding TPN and discuss importance of adequate nutrition. Pt was engaged and was agreeable to moving forward with TPN as needed. RD suggested continuing with clear liquids today and trialing full liquids tomorrow, if pt begins to have diarrhea and abdominal cramping then will pursue TPN, otherwise will continue slow progression towards solids. MD was agreeable to this plan as well.     Recommend: Consider nutrition support in the next 24 hrs if pt unable to tolerate upgrade to full liquids. Messaged MD via Voalte    RD following.

## 2023-03-03 NOTE — CARE PLAN
The patient is Stable - Low risk of patient condition declining or worsening    Shift Goals  Clinical Goals: Skin care, IV therapy  Patient Goals: comfort, skin care  Family Goals: none present    Progress made toward(s) clinical / shift goals:  Patient continued to tolerate IV antibiotics /fluids as prescribed. Skin integrity unchanged.    Patient is not progressing towards the following goals:      Problem: Knowledge Deficit - Standard  Goal: Patient and family/care givers will demonstrate understanding of plan of care, disease process/condition, diagnostic tests and medications  Outcome: Progressing     Problem: Skin Integrity  Goal: Skin integrity is maintained or improved  Outcome: Progressing

## 2023-03-04 ENCOUNTER — APPOINTMENT (OUTPATIENT)
Dept: RADIOLOGY | Facility: MEDICAL CENTER | Age: 73
DRG: 871 | End: 2023-03-04
Attending: INTERNAL MEDICINE
Payer: MEDICARE

## 2023-03-04 LAB
ANION GAP SERPL CALC-SCNC: 10 MMOL/L (ref 7–16)
BUN SERPL-MCNC: 8 MG/DL (ref 8–22)
CALCIUM SERPL-MCNC: 8.7 MG/DL (ref 8.5–10.5)
CHLORIDE SERPL-SCNC: 107 MMOL/L (ref 96–112)
CO2 SERPL-SCNC: 25 MMOL/L (ref 20–33)
CREAT SERPL-MCNC: 0.85 MG/DL (ref 0.5–1.4)
ERYTHROCYTE [DISTWIDTH] IN BLOOD BY AUTOMATED COUNT: 51.2 FL (ref 35.9–50)
GFR SERPLBLD CREATININE-BSD FMLA CKD-EPI: 73 ML/MIN/1.73 M 2
GLUCOSE BLD STRIP.AUTO-MCNC: 102 MG/DL (ref 65–99)
GLUCOSE SERPL-MCNC: 108 MG/DL (ref 65–99)
HCT VFR BLD AUTO: 32.1 % (ref 37–47)
HGB BLD-MCNC: 10.2 G/DL (ref 12–16)
MAGNESIUM SERPL-MCNC: 2.1 MG/DL (ref 1.5–2.5)
MCH RBC QN AUTO: 25.1 PG (ref 27–33)
MCHC RBC AUTO-ENTMCNC: 31.8 G/DL (ref 33.6–35)
MCV RBC AUTO: 79.1 FL (ref 81.4–97.8)
PHOSPHATE SERPL-MCNC: 2.7 MG/DL (ref 2.5–4.5)
PLATELET # BLD AUTO: 374 K/UL (ref 164–446)
PMV BLD AUTO: 11.7 FL (ref 9–12.9)
POTASSIUM SERPL-SCNC: 3.3 MMOL/L (ref 3.6–5.5)
RBC # BLD AUTO: 4.06 M/UL (ref 4.2–5.4)
SODIUM SERPL-SCNC: 142 MMOL/L (ref 135–145)
WBC # BLD AUTO: 6.5 K/UL (ref 4.8–10.8)

## 2023-03-04 PROCEDURE — 700105 HCHG RX REV CODE 258

## 2023-03-04 PROCEDURE — 770004 HCHG ROOM/CARE - ONCOLOGY PRIVATE *

## 2023-03-04 PROCEDURE — 700111 HCHG RX REV CODE 636 W/ 250 OVERRIDE (IP): Performed by: INTERNAL MEDICINE

## 2023-03-04 PROCEDURE — 700105 HCHG RX REV CODE 258: Performed by: INTERNAL MEDICINE

## 2023-03-04 PROCEDURE — 80048 BASIC METABOLIC PNL TOTAL CA: CPT

## 2023-03-04 PROCEDURE — 700102 HCHG RX REV CODE 250 W/ 637 OVERRIDE(OP): Performed by: STUDENT IN AN ORGANIZED HEALTH CARE EDUCATION/TRAINING PROGRAM

## 2023-03-04 PROCEDURE — 82962 GLUCOSE BLOOD TEST: CPT

## 2023-03-04 PROCEDURE — 99233 SBSQ HOSP IP/OBS HIGH 50: CPT | Performed by: INTERNAL MEDICINE

## 2023-03-04 PROCEDURE — 700102 HCHG RX REV CODE 250 W/ 637 OVERRIDE(OP): Performed by: INTERNAL MEDICINE

## 2023-03-04 PROCEDURE — 83735 ASSAY OF MAGNESIUM: CPT

## 2023-03-04 PROCEDURE — 02HV33Z INSERTION OF INFUSION DEVICE INTO SUPERIOR VENA CAVA, PERCUTANEOUS APPROACH: ICD-10-PCS | Performed by: INTERNAL MEDICINE

## 2023-03-04 PROCEDURE — C1751 CATH, INF, PER/CENT/MIDLINE: HCPCS

## 2023-03-04 PROCEDURE — 36415 COLL VENOUS BLD VENIPUNCTURE: CPT

## 2023-03-04 PROCEDURE — 700102 HCHG RX REV CODE 250 W/ 637 OVERRIDE(OP): Performed by: HOSPITALIST

## 2023-03-04 PROCEDURE — A9270 NON-COVERED ITEM OR SERVICE: HCPCS | Performed by: INTERNAL MEDICINE

## 2023-03-04 PROCEDURE — 700101 HCHG RX REV CODE 250: Performed by: INTERNAL MEDICINE

## 2023-03-04 PROCEDURE — 85027 COMPLETE CBC AUTOMATED: CPT

## 2023-03-04 PROCEDURE — A9270 NON-COVERED ITEM OR SERVICE: HCPCS | Performed by: STUDENT IN AN ORGANIZED HEALTH CARE EDUCATION/TRAINING PROGRAM

## 2023-03-04 PROCEDURE — A9270 NON-COVERED ITEM OR SERVICE: HCPCS | Performed by: HOSPITALIST

## 2023-03-04 PROCEDURE — 84100 ASSAY OF PHOSPHORUS: CPT

## 2023-03-04 RX ORDER — DEXTROSE AND SODIUM CHLORIDE 5; .9 G/100ML; G/100ML
INJECTION, SOLUTION INTRAVENOUS CONTINUOUS
Status: DISCONTINUED | OUTPATIENT
Start: 2023-03-04 | End: 2023-03-04

## 2023-03-04 RX ORDER — LANOLIN ALCOHOL/MO/W.PET/CERES
400 CREAM (GRAM) TOPICAL 2 TIMES DAILY
Status: COMPLETED | OUTPATIENT
Start: 2023-03-04 | End: 2023-03-04

## 2023-03-04 RX ORDER — M-VIT,TX,IRON,MINS/CALC/FOLIC 27MG-0.4MG
1 TABLET ORAL DAILY
Status: DISCONTINUED | OUTPATIENT
Start: 2023-03-04 | End: 2023-03-12 | Stop reason: HOSPADM

## 2023-03-04 RX ADMIN — POTASSIUM BICARBONATE 25 MEQ: 978 TABLET, EFFERVESCENT ORAL at 06:19

## 2023-03-04 RX ADMIN — PIPERACILLIN AND TAZOBACTAM 4.5 G: 4; .5 INJECTION, POWDER, LYOPHILIZED, FOR SOLUTION INTRAVENOUS; PARENTERAL at 09:41

## 2023-03-04 RX ADMIN — PIPERACILLIN AND TAZOBACTAM 4.5 G: 4; .5 INJECTION, POWDER, LYOPHILIZED, FOR SOLUTION INTRAVENOUS; PARENTERAL at 01:11

## 2023-03-04 RX ADMIN — Medication 5 MG: at 20:21

## 2023-03-04 RX ADMIN — FAMOTIDINE: 10 INJECTION, SOLUTION INTRAVENOUS at 22:55

## 2023-03-04 RX ADMIN — LIDOCAINE HYDROCHLORIDE 30 ML: 20 SOLUTION OROPHARYNGEAL at 21:22

## 2023-03-04 RX ADMIN — SERTRALINE 100 MG: 100 TABLET, FILM COATED ORAL at 05:18

## 2023-03-04 RX ADMIN — OMEPRAZOLE 40 MG: 20 CAPSULE, DELAYED RELEASE ORAL at 05:18

## 2023-03-04 RX ADMIN — MULTIPLE VITAMINS W/ MINERALS TAB 1 TABLET: TAB at 18:05

## 2023-03-04 RX ADMIN — Medication 400 MG: at 18:06

## 2023-03-04 RX ADMIN — PIPERACILLIN AND TAZOBACTAM 4.5 G: 4; .5 INJECTION, POWDER, LYOPHILIZED, FOR SOLUTION INTRAVENOUS; PARENTERAL at 18:08

## 2023-03-04 RX ADMIN — ENOXAPARIN SODIUM 40 MG: 40 INJECTION SUBCUTANEOUS at 18:05

## 2023-03-04 RX ADMIN — SERTRALINE 100 MG: 100 TABLET, FILM COATED ORAL at 18:05

## 2023-03-04 RX ADMIN — POTASSIUM BICARBONATE 25 MEQ: 978 TABLET, EFFERVESCENT ORAL at 18:05

## 2023-03-04 RX ADMIN — DEXTROSE AND SODIUM CHLORIDE: 5; 900 INJECTION, SOLUTION INTRAVENOUS at 21:26

## 2023-03-04 RX ADMIN — POTASSIUM BICARBONATE 25 MEQ: 978 TABLET, EFFERVESCENT ORAL at 14:01

## 2023-03-04 RX ADMIN — LIDOCAINE HYDROCHLORIDE 30 ML: 20 SOLUTION OROPHARYNGEAL at 05:18

## 2023-03-04 RX ADMIN — DIBASIC SODIUM PHOSPHATE, MONOBASIC POTASSIUM PHOSPHATE AND MONOBASIC SODIUM PHOSPHATE 500 MG: 852; 155; 130 TABLET ORAL at 14:01

## 2023-03-04 RX ADMIN — DIBASIC SODIUM PHOSPHATE, MONOBASIC POTASSIUM PHOSPHATE AND MONOBASIC SODIUM PHOSPHATE 500 MG: 852; 155; 130 TABLET ORAL at 06:19

## 2023-03-04 RX ADMIN — Medication 400 MG: at 06:20

## 2023-03-04 RX ADMIN — LIDOCAINE HYDROCHLORIDE 30 ML: 20 SOLUTION OROPHARYNGEAL at 14:00

## 2023-03-04 RX ADMIN — QUETIAPINE FUMARATE 50 MG: 25 TABLET ORAL at 20:20

## 2023-03-04 ASSESSMENT — FIBROSIS 4 INDEX: FIB4 SCORE: 1.39

## 2023-03-04 ASSESSMENT — ENCOUNTER SYMPTOMS
CONSTIPATION: 1
NECK PAIN: 0
SINUS PAIN: 0
NERVOUS/ANXIOUS: 0
HEADACHES: 0
ABDOMINAL PAIN: 1
DIARRHEA: 0
FEVER: 0
SORE THROAT: 0
EYE PAIN: 0
NAUSEA: 0
BRUISES/BLEEDS EASILY: 0
BLOOD IN STOOL: 0
FLANK PAIN: 1
MYALGIAS: 0
VOMITING: 0
BACK PAIN: 0
HEMOPTYSIS: 0
SHORTNESS OF BREATH: 0
DEPRESSION: 1
CHILLS: 0

## 2023-03-04 ASSESSMENT — PAIN DESCRIPTION - PAIN TYPE
TYPE: ACUTE PAIN
TYPE: ACUTE PAIN

## 2023-03-04 NOTE — PROCEDURES
Vascular Access Team     Date of Insertion: 3/4/23  Arm Circumference: 20.5  Internal length: 42  External Length: 0  Vein Occupancy %: 45   Reason for PICC: TPN  Labs: WBC 6.5, , BUN 8, Cr 0.85, GFR 73, INR na     Consents confirmed, vessel patency confirmed with ultrasound. Risks and benefits of procedure explained to patient and education regarding central line associated bloodstream infections provided. Questions answered.      PICC placed in LUE per licensed provider order with ultrasound guidance.  5 Fr, double lumen PICC placed in brachial vein after 1 attempt(s). 2 mL of 1% lidocaine injected intradermally at the insertion site. A 21 gauge microintroducer needle was visualized entering the vein and modified Seldinger technique was used to obtain access to the vein. 42 cm catheter inserted and brisk blood return was observed from each lumen upon aspiration. Line secured at the 0 cm marker. TCS stylet removed and observed to be fully intact. Each lumen flushed using pulsatile method without resistance with 10 mL 0.9% normal saline. PICC line secured with Biopatch and Tegaderm.     PICC tip placement location is confirmed by nurse to be in the Superior Vena Cava (SVC) utilizing 3CG technology. PICC line is appropriate for use at this time. Patient tolerated procedure well, without complications.  Patient condition relayed to primary RN or ordering physician via this post procedure note in the EMR.      Ultrasound images uploaded to PACS and viewable in the EMR - yes  Ultrasound imaged printed and placed in paper chart - no     BARD Power PICC ref # [id number], Lot # [id number], Expiration Date 11/30/2023

## 2023-03-04 NOTE — DIETARY
"Nutrition Support Assessment   Day 9 of admit.  Mena Campos is a 72 y.o. female with admitting DX of Psoas abscess and sepsis.      Current problem list:  Urinary retention  External hemorrhoids  Other dysphagia   EtOH w/d syndrome with delirium   Microcytic anemia  Hyponatremia   Hypokalemia   Retroperitoneal mass  Sepsis with encephalopathy without septic shock  Acute encephalopathy   Leg swelling  GERD with esophagitis   OAB  Moderate episode of recurrent major depressive disorder     Assessment:  Estimated Nutritional Needs: based on:   Height: 167.6 cm (5' 6\")  Weight: 48 kg (105 lb 13.1 oz) via bed scale   Weight to Use in Calculations: 48 kg (105 lb 13.1 oz)  Ideal Body Weight: 59.1 kg (130 lb 4.7 oz)  Percent Ideal Body Weight: 81.2  Body mass index is 17.08 kg/m²., BMI classification: underweight     Calculation/Equation:   REE per WSJ=8933 kcals/day (x1.3-1.8=9541-7920 kcals/day)  Total Calories / day: 1200 - 1440 (Calories / k - 30)  Total Grams Protein / day: 60 - 72 (Grams Protein / k.25 - 1.5)     Evaluation:   Pt with 9 days of poor nutrition while in acute care with multiple NPO/CLD diet orders. Pt malnourished at admit. Severe malnutrition criteria met . Diet downgraded to clear liquid diet due to abdominal cramping, nausea and vomiting. Pt cleared for trial of full liquid diet per SLP.  RD consulted for TPN, TPN indicated to meet pt's estimated needs as liquid diet does not provide adequate nutrition.   PICC line placed 3/4  Current clinical picture and MD progress notes reviewed. Pt with repeated UTIs found to have psoas abscess, s/p IR drain placed . Pt with decreased intake due to intense abdominal pain.   Labs (3/4) K+ 3.3 (L), Glu 108 (H)  Meds: NaCl with Kcl @ 75 mL/hr, mag ox, Kphos, Klyte,   Skin: no noted staged wounds or pressure injuries   +BM 3/3     Malnutrition Risk: Severe malnutrition met      Recommendations/Plan:  TPN per PharmD utilizing RD " recommendations.    Full liquid diet and supplements as tolerated. Intake >50% of all meals and supplements  Diet advancements per MD and SLP  Fluids per MD   Monitor weight and lytes         RD following

## 2023-03-04 NOTE — PROGRESS NOTES
"Pharmacy TPN Note for 3/4/2023     72 y.o. female on TPN day # 1      Admission History: Admitted on 2023 for Psoas abscess (HCC) [K68.12]  Sepsis (HCC) [A41.9]    Allergies:   Seasonal and Wellbutrin [bupropion hcl]     Indication for TPN:   Indication: Prolonged bowel rest;Severe malnutrition       Clinical Considerations for TPN:   Clinical Considerations Impacting TPN: Re-feeding syndrome           Temp (24hrs), Av.7 °C (98 °F), Min:36.3 °C (97.3 °F), Max:37.2 °C (99 °F)    Recent Labs     23  0217 23  0139 23  0210   SODIUM 139 139 142   POTASSIUM 3.5* 3.7 3.3*   CHLORIDE 106 105 107   CO2 23 25 25   BUN 10 9 8   CREATININE 0.84 0.88 0.85   GLUCOSE 119* 108* 108*   CALCIUM 8.8 8.7 8.7   PHOSPHORUS 2.9 3.0 2.7   MAGNESIUM 1.8 1.9 2.1     Accu-Checks  No results for input(s): POCGLUCOSE in the last 72 hours.    Vitals:    23 1538 23 2111 23 0408 23 0714   BP: 137/76 106/71 122/73 139/74   Weight:    48 kg (105 lb 13.1 oz)   Height:    1.676 m (5' 6\")       Intake/Output Summary (Last 24 hours) at 3/4/2023 1432  Last data filed at 3/4/2023 0914  Gross per 24 hour   Intake --   Output 4625 ml   Net -4625 ml       Orders Placed This Encounter   Procedures    Diet Order Diet: Full Liquid     Standing Status:   Standing     Number of Occurrences:   1     Order Specific Question:   Diet:     Answer:   Full Liquid [11]       TPN for past 72 hours (Show up to 3 orders; newest on the left.)       Start date and time    2023      TPN Adult Central Line [[id number]]    Order Status  Active    Frequency  TPN DAILY       Base    Clinisol  35 g    dextrose 70%  101 g    fat emulsion (soy) 20%  17 g       Additives    potassium phosphate  21 mmol    potassium chloride  50 mEq    sodium acetate  115 mEq    sodium chloride  162 mEq    magnesium sulfate  16 mEq    calcium GLUConate  4.65 mEq    M.T.E.-4 Tralement  1 mL    famotidine  20 mg    thiamine  100 mg    folic " acid  1 mg       QS Base    sterile water  1,189.23 mL       Energy Contribution    Proteins  140 kcal    Dextrose  343.4 kcal    Lipids  170 kcal    Total  653.4 kcal       Electrolyte Ion Calculated Amount    Sodium  277 mEq    Potassium  80.8 mEq    Calcium  4.65 mEq    Magnesium  16 mEq    Aluminum  58.33 mEq    Phosphate  21 mmol    Chloride  212 mEq    Acetate  144.63 mEq    Chloride: Acetate Ratio  1.465       Other    Total Amino Acid  35 g    Total Amino Acid/kg  0.73 g/kg    Glucose Infusion Rate  1.46 mg/kg/min    Volume  1,800 mL    Rate  75 mL/hr    Dosing Weight  48 kg    Infusion Site  [facility]            TPN Requirements:  Weight Used in TPN Calculation: 48 kg (105 lb 13.1 oz)  Total Protein Needs (g/kg): 1.45 g/kg  Total Caloric Needs (kcal): 1300 kcal  Total Fluid Needs (mL): 1800 mL     Current TPN Formulation:  % of goal: 50  % kcal as lipids: 26  Grams protein/k.73  Non-protein calories: 483.4  Kcals/k.6  Total daily calories: 653.4    Comments:  1) Nutritional Plan: Pt has criteria for severe malnutrition since  but wanted to postpone TPN for as long as possible. Multiple CLD trials have resulted in abdominal cramping, N/V, and diarrhea. Per discussion with Dr. Mcallister today, pt needs bowel rest for at least a few days at this time in hopes that she will tolerate CLD and advance more successfully than she has during this admission. Double lumen PICC line placed today. Given her severe malnutrition status, refeeding syndrome is anticipated. TPN formulated at 50% goal kcals and may need to continue at 50% of goal nutrition until lytes are stable on current formulation.   2) Labs: CMP, Mag, Phos, Cholesterol with AM labs  3) Fluids/additives: TPN formulated to NS equivalence with daily KCl (~36 meq in MIVF and at least 50 meq oral replacement per day) taken into account. Pt has been on oral mag replacement and oral phos replacement which will finish prior to start of TPN tonight. She  has not had any dextrose containing fluid this admission, and will likely experience a shift in K, Mag, and phos. Thiamine and folic acid have been added to tonight's TPN (pt has received 3 doses of thiamine 100mg and 1 dose of IV folic acid 1 mg during this admission). Dr. Mcallister also agreed to d/c omeprazole 40 mg and add famotidine 20 mg IV to TPN. Pt's CrCl < 50 ml/min which is why 20 mg dose was chosen.  Due to national shortage, oral MVI was ordered.   4) Changes to formulation: new start.    5) Next labs/note: 3/5     Anuradha Price, PharmD, BCOP

## 2023-03-04 NOTE — CARE PLAN
The patient is Stable - Low risk of patient condition declining or worsening    Shift Goals  Clinical Goals: skin care, IV abx  Patient Goals: rest, skin care  Family Goals: none present    Progress made toward(s) clinical / shift goals:    Problem: Knowledge Deficit - Standard  Goal: Patient and family/care givers will demonstrate understanding of plan of care, disease process/condition, diagnostic tests and medications  Outcome: Progressing  Note: Pt educated on plan of care, pt verbalizes understanding.      Problem: Pain - Standard  Goal: Alleviation of pain or a reduction in pain to the patient’s comfort goal  Outcome: Progressing  Note: Pain assessed using 0-10 pain scale, pain medication given per MAR.      Problem: Fall Risk  Goal: Patient will remain free from falls  Outcome: Progressing  Note: Bed is locked and in lowest position, call light and personal belongings within reach.        Patient is not progressing towards the following goals:

## 2023-03-04 NOTE — PROGRESS NOTES
Hospital Medicine Daily Progress Note    Date of Service  3/4/2023    Chief Complaint  Mena Campos is a 72 y.o. female with GERD, MDD, EtOH use DO, and Left Renal / RP mass followed by oncologist Dr. Campos admitted 2/23/2023 with Right psoas abscess    Hospital Course  No notes on file    Mena Campos is a 72 y.o. female who presented 2/23/2023 with past medical history of repeated urine infection who comes into the hospital for right-sided flank pain.  This has been occurring for the past 2 months.  She was found to have right-sided kidney mass on January 10.  She had a biopsy of this mass on February 6.  She went to visit an oncologist today Dr. Campos who saw the biopsy results and noticed it being an abscess.  He sent her to the emergency room right away.  Repeat CT scan was completed found a psoas abscess.  General surgery recommended IR drainage.  Status post IR drain placed 2/24.  Cultures grew Citrobacter koseri.  ID was consulted.  Patient was started on Zosyn.    Repeat CT on 2/28 showed increase in size of psoas abscess measuring 3.9 x 4.7 cm with pigtail drainage catheter in place, trace blood fusions, splenomegaly, nonobstructive left renal stones, mesenteric and body wall edema.  C. difficile is negative.    Discussed with interventional radiology Dr. Sandoval.  Psoas abscess was multiloculated.  Drain has minimal output and was discontinued on 3/1.  Remaining abscess is loculated and not connected to the drain, it is currently too small to place a new drain and will hopefully resolve with antibiotics.    Hospital course was also complicated by alcohol withdrawal.    Interval Problem Update  Patient was seen and examined at bedside.  I have personally reviewed and interpreted vitals, labs, and imaging.    2/28.  Afebrile.  Slightly hypertensive.  On 0-1 L nasal cannula.  Leukocytosis trending down to 11.7.  Repeat potassium, magnesium, phosphorus.  Denies fever, chills, chest pains,  shortness of breath.  Has been having abdominal cramping and loose bowel movements.  Screen for C. difficile.  Continue Zosyn for now.  De-escalated back to clear liquid diet as patient having severe abdominal pain.  3/1.  Afebrile.  Slightly hypertensive.  On room air.  Leukocytosis resolved.  Replete K, Mg.  Start maintenance fluids denies fevers, chills, chest pains, shortness of breath.  Patient still have abdominal cramping but this is improved from yesterday.  Tolerating liquid diet.  Discussed TPN with patient and concern for malnutrition versus risks of TPN.  Patient would like to hold off on TPN and continue to try oral diet for now.  DC psoas drain today.  ID recommends 1 more week of IV antibiotics followed by oral.  3/2.  Afebrile.  Stable vitals.  On room air.  Replete K, mag, phos.  Denies fevers, chills, chest pains, shortness of breath.  Had persistent abdominal cramping and diarrhea yesterday.  Reports he does feel better today but she has not tried eating yet.  States she feels better this morning and would like to try clear liquids again.  Would like to hold off on TPN as long as possible.  Discussed with ID.  Plan for repeat CT abdomen pelvis on 3/7.  Continue IV Zosyn until then.  If abscess is resolved can switch to oral antibiotics.  3/3.  Afebrile.  Stable vitals.  On room air.  Replete mag, K.  Denies fever, chills, chest pains, shortness of breath.  Patient still did not eat well yesterday with persistent abdominal cramping and diarrhea.  Did do better with clear liquid diet this morning and seems to be making progress in the bilateral.  Discussed with patient and dietary.  We will plan to advance to full liquids tomorrow if patient can tolerate clears better.  Patient considering TPN if she cannot tolerate diet.  Repeat CT on 3/7 to evaluate psoas abscess, colitis.  Continue Zosyn  3/4.  Afebrile.  Stable vitals.  On room air.  Replete mag, k, Phos.  Denies fever, chills, chest pains,  shortness of breath.  Yesterday afternoon she did have abdominal cramping and some diarrhea.  She has made limited progress while on clears.  She is agreeable to start TPN.  Ordered PICC.  Advance to full liquid diet.  We will continue to monitor oral intake and abdominal cramping.    I have discussed this patient's plan of care and discharge plan at IDT rounds today with Case Management, Nursing, Nursing leadership, and other members of the IDT team.    Consultants/Specialty  general surgery, infectious disease, and interventional radiology    Code Status  Full Code    Disposition  Patient is not medically cleared for discharge.   Anticipate discharge to to home with organized home healthcare and close outpatient follow-up.  I have placed the appropriate orders for post-discharge needs.    Review of Systems  Review of Systems   Constitutional:  Positive for malaise/fatigue. Negative for chills and fever.   HENT:  Negative for ear pain, nosebleeds, sinus pain and sore throat.    Eyes:  Negative for pain.   Respiratory:  Negative for hemoptysis and shortness of breath.    Cardiovascular:  Negative for chest pain and leg swelling.   Gastrointestinal:  Positive for abdominal pain and constipation. Negative for blood in stool, diarrhea, melena, nausea and vomiting.   Genitourinary:  Positive for flank pain. Negative for dysuria and hematuria.   Musculoskeletal:  Negative for back pain, joint pain, myalgias and neck pain.   Neurological:  Negative for headaches.   Endo/Heme/Allergies:  Does not bruise/bleed easily.   Psychiatric/Behavioral:  Positive for depression. The patient is not nervous/anxious.       Physical Exam  Temp:  [36 °C (96.8 °F)-37.2 °C (99 °F)] 36.3 °C (97.3 °F)  Pulse:  [65-83] 65  Resp:  [16-18] 18  BP: (106-137)/(71-76) 122/73  SpO2:  [96 %-100 %] 98 %    Physical Exam  Vitals and nursing note reviewed. Exam conducted with a chaperone present.   Constitutional:       Appearance: She is cachectic. She  is ill-appearing (Chronically).   HENT:      Head: Normocephalic and atraumatic.      Comments: Bitemporal wasting     Right Ear: External ear normal.      Left Ear: External ear normal.      Nose: Nose normal.      Mouth/Throat:      Mouth: Mucous membranes are dry.   Eyes:      Extraocular Movements: Extraocular movements intact.      Conjunctiva/sclera: Conjunctivae normal.   Cardiovascular:      Rate and Rhythm: Normal rate and regular rhythm.      Pulses: Normal pulses.      Heart sounds: Normal heart sounds. No murmur heard.    No friction rub. No gallop.   Pulmonary:      Effort: Pulmonary effort is normal. No respiratory distress.      Breath sounds: Normal breath sounds. No wheezing, rhonchi or rales.   Abdominal:      General: Abdomen is flat. Bowel sounds are normal. There is no distension.      Palpations: Abdomen is soft.      Tenderness: There is abdominal tenderness. There is no guarding or rebound.   Genitourinary:     Comments: +Ferrera, urine yellow / clear  Musculoskeletal:      Cervical back: Neck supple.      Right lower leg: No edema.      Left lower leg: No edema.   Skin:     General: Skin is warm.   Neurological:      General: No focal deficit present.      Mental Status: She is alert and oriented to person, place, and time. Mental status is at baseline.      Cranial Nerves: No cranial nerve deficit.      Motor: No tremor.   Psychiatric:         Attention and Perception: Attention and perception normal.         Mood and Affect: Mood normal. Affect is blunt.         Speech: Speech normal.         Behavior: Behavior normal. Behavior is cooperative.       Fluids    Intake/Output Summary (Last 24 hours) at 3/4/2023 0603  Last data filed at 3/4/2023 0408  Gross per 24 hour   Intake 240 ml   Output 3775 ml   Net -3535 ml         Laboratory  Recent Labs     03/02/23  0217 03/03/23  0139 03/04/23  0210   WBC 8.9 6.7 6.5   RBC 3.72* 3.75* 4.06*   HEMOGLOBIN 9.4* 9.5* 10.2*   HEMATOCRIT 29.1* 29.7* 32.1*    MCV 78.2* 79.2* 79.1*   MCH 25.3* 25.3* 25.1*   MCHC 32.3* 32.0* 31.8*   RDW 50.2* 51.2* 51.2*   PLATELETCT 342 334 374   MPV 10.9 11.6 11.7       Recent Labs     03/02/23  0217 03/03/23  0139 03/04/23  0210   SODIUM 139 139 142   POTASSIUM 3.5* 3.7 3.3*   CHLORIDE 106 105 107   CO2 23 25 25   GLUCOSE 119* 108* 108*   BUN 10 9 8   CREATININE 0.84 0.88 0.85   CALCIUM 8.8 8.7 8.7                       Imaging  CT-ABDOMEN-PELVIS WITH   Final Result      1.  Marked interval decrease in size of large right psoas, posterior pararenal and subcutaneous right flank abscess. There is percutaneous pigtail drainage catheter in place. There is residual abscess in the subcutaneous tissues measuring about 3.9 x 4.7    cm.   2.  Trace pleural effusions.   3.  Splenomegaly.   4.  Nonobstructing left renal stones.   5.  Limited evaluation for bowel wall thickening due to decompression. There could be sigmoid colon wall thickening and some scattered fluid within the colon. Correlate for colitis. No bowel obstruction.   6.  Mesenteric and body wall edema.      CT-DRAIN-RETROPERITONEAL   Final Result      1.  CT guided placement of a percutaneous drainage catheter in a right psoas fluid collection.   2.  The current plan is to obtain a follow-up CT scan in 5-7 days.      US-EXTREMITY VENOUS LOWER BILAT   Final Result      OUTSIDE IMAGES-CT ABDOMEN /PELVIS   Final Result      [id number] VIEW   Final Result      Loops of bowel and colon are somewhat indistinct, possibly related to technique. Appearance appears overall somewhat similar to outside facility CT abdomen pelvis from 2/23/2023. If symptoms have changed from recent CT, CT evaluation is recommended.             Assessment/Plan  * Psoas abscess (HCC)- (present on admission)  Assessment & Plan  Presented to [facility] with Right flank pain  CT demonstrated psoas abscess prompting transfer to [facility] for IR  General surgery consulted and s/o, recommended IR-guided drainage  IR-guided  drainage 2/24/23, Repeat CT 2/28/23  RADHA output < 50 cc, requested RN flushes BID and record output  Abscess culture +Citrobacter koseri, pan-susceptible  Continue zosyn for now  ID consulted for antibiotic planning after source control  HIV negative    Drain discontinued 3/1  Repeat CT on 3/7.    Urinary retention  Assessment & Plan  Likely due to oxybutynin - discontinued  No UA from prior to haq placement  Zosyn expected to cover most urine pathogens - culture deferred  Trial haq discontinuation prior to discharge, if unsuccessful will refer to urology  Hold off on voiding trial for now as patient still having significant abdominal pain and diarrhea    External hemorrhoids  Assessment & Plan  Bowel protocol - senna QHS PRN, miralax PRN    Other dysphagia- (present on admission)  Assessment & Plan  Resolved  SLP evaluation  Modified barium swallow deferred per SLP recommendation    Retroperitoneal mass- (present on admission)  Assessment & Plan  Underwent biopsy of Left renal pole mass extending into RP, it was not indicative of malignancy  Follow up with Oncologist Dr. Campos for further diagnostic evaluation after discharge    Hypokalemia- (present on admission)  Assessment & Plan  Resolved  Likely due to inadequate PO intake from EtOH use DO  Mg WNL  Kdur 40 daily  Repeat AM BMP    Hyponatremia- (present on admission)  Assessment & Plan  Mild, recurrent  Likely due to poor PO intake from EtOH use DO  Repeat AM BMP    Microcytic anemia- (present on admission)  Assessment & Plan  Follows with Heme-Onc Dr. Campos  Elevated ferritin indicative of AOCD  Transfuse for Hgb <7    Alcohol withdrawal syndrome, with delirium (HCC)- (present on admission)  Assessment & Plan  Resolved  Reports >2 beers daily, most recent 2/22/23  Developed diaphoresis, delirium, tremor, tachycardia on HD#2  CIWA-Lorazepam protocol  Empiric MVN + B12 + Folate + Thiamine  Will discuss MAT / AA prior to discharge    Leg swelling- (present on  admission)  Assessment & Plan  BLE US negative for DVT  TTE deferred    Acute encephalopathy- (present on admission)  Assessment & Plan  Resolved  Likely multifactorial including underlying abscess and EtOH withdrawal - see separate plans  Nonfocal, CTH deferred    Sepsis with encephalopathy without septic shock (HCC)- (present on admission)  Assessment & Plan  This is Sepsis Present on admission  SIRS criteria identified on my evaluation include: Fever, with temperature greater than 101 deg F, Tachypnea, with respirations greater than 20 per minute and Leukocytosis, with WBC greater than 12,000  Source is right psoas abscess  Sepsis protocol initiated  Fluid resuscitation ordered per protocol  Crystalloid Fluid Administration: Fluid resuscitation ordered per standard protocol - 30 mL/kg per current or ideal body weight  IV antibiotics as appropriate for source of sepsis  Reassessment: I have reassessed the patient's hemodynamic status    BCx 2/23: NGTD  Abscess Cx 2/24: +Citrobacter koseri  Continue zosyn for intra-abdominal abscess  Leukocytosis improving  ID consulted for antibiotic planning after source control    Gastroesophageal reflux disease with esophagitis- (present on admission)  Assessment & Plan  Likely exacerbated by EtOH use DO  Continue omeprazole    OAB (overactive bladder)- (present on admission)  Assessment & Plan  Discontinued oxybutynin due to urinary retention    Moderate episode of recurrent major depressive disorder (HCC)- (present on admission)  Assessment & Plan  Continue sertraline       VTE prophylaxis: SCDs/TEDs and enoxaparin ppx    I have performed a physical exam and reviewed and updated ROS and Plan today (3/4/2023). In review of yesterday's note (3/3/2023), there are no changes except as documented above.

## 2023-03-05 ENCOUNTER — APPOINTMENT (OUTPATIENT)
Dept: RADIOLOGY | Facility: REHABILITATION | Age: 73
DRG: 871 | End: 2023-03-05
Attending: INTERNAL MEDICINE
Payer: MEDICARE

## 2023-03-05 ENCOUNTER — APPOINTMENT (OUTPATIENT)
Dept: RADIOLOGY | Facility: MEDICAL CENTER | Age: 73
DRG: 871 | End: 2023-03-05
Attending: INTERNAL MEDICINE
Payer: MEDICARE

## 2023-03-05 LAB
ALBUMIN SERPL BCP-MCNC: 2.9 G/DL (ref 3.2–4.9)
ALBUMIN/GLOB SERPL: 0.9 G/DL
ALP SERPL-CCNC: 63 U/L (ref 30–99)
ALT SERPL-CCNC: 13 U/L (ref 2–50)
ANION GAP SERPL CALC-SCNC: 9 MMOL/L (ref 7–16)
AST SERPL-CCNC: 20 U/L (ref 12–45)
BILIRUB SERPL-MCNC: 0.2 MG/DL (ref 0.1–1.5)
BUN SERPL-MCNC: 10 MG/DL (ref 8–22)
CALCIUM ALBUM COR SERPL-MCNC: 9.9 MG/DL (ref 8.5–10.5)
CALCIUM SERPL-MCNC: 9 MG/DL (ref 8.5–10.5)
CHLORIDE SERPL-SCNC: 104 MMOL/L (ref 96–112)
CHOLEST SERPL-MCNC: 109 MG/DL (ref 100–199)
CO2 SERPL-SCNC: 25 MMOL/L (ref 20–33)
CREAT SERPL-MCNC: 0.86 MG/DL (ref 0.5–1.4)
GFR SERPLBLD CREATININE-BSD FMLA CKD-EPI: 72 ML/MIN/1.73 M 2
GLOBULIN SER CALC-MCNC: 3.2 G/DL (ref 1.9–3.5)
GLUCOSE BLD STRIP.AUTO-MCNC: 99 MG/DL (ref 65–99)
GLUCOSE SERPL-MCNC: 114 MG/DL (ref 65–99)
MAGNESIUM SERPL-MCNC: 2.2 MG/DL (ref 1.5–2.5)
PHOSPHATE SERPL-MCNC: 2.7 MG/DL (ref 2.5–4.5)
POTASSIUM SERPL-SCNC: 3.8 MMOL/L (ref 3.6–5.5)
PROT SERPL-MCNC: 6.1 G/DL (ref 6–8.2)
SODIUM SERPL-SCNC: 138 MMOL/L (ref 135–145)

## 2023-03-05 PROCEDURE — 700101 HCHG RX REV CODE 250: Performed by: INTERNAL MEDICINE

## 2023-03-05 PROCEDURE — 700105 HCHG RX REV CODE 258: Performed by: INTERNAL MEDICINE

## 2023-03-05 PROCEDURE — 84100 ASSAY OF PHOSPHORUS: CPT

## 2023-03-05 PROCEDURE — 700102 HCHG RX REV CODE 250 W/ 637 OVERRIDE(OP): Performed by: STUDENT IN AN ORGANIZED HEALTH CARE EDUCATION/TRAINING PROGRAM

## 2023-03-05 PROCEDURE — A9270 NON-COVERED ITEM OR SERVICE: HCPCS | Performed by: STUDENT IN AN ORGANIZED HEALTH CARE EDUCATION/TRAINING PROGRAM

## 2023-03-05 PROCEDURE — 80053 COMPREHEN METABOLIC PANEL: CPT

## 2023-03-05 PROCEDURE — 76705 ECHO EXAM OF ABDOMEN: CPT

## 2023-03-05 PROCEDURE — 99233 SBSQ HOSP IP/OBS HIGH 50: CPT | Performed by: INTERNAL MEDICINE

## 2023-03-05 PROCEDURE — 700102 HCHG RX REV CODE 250 W/ 637 OVERRIDE(OP): Performed by: INTERNAL MEDICINE

## 2023-03-05 PROCEDURE — 83735 ASSAY OF MAGNESIUM: CPT

## 2023-03-05 PROCEDURE — 82465 ASSAY BLD/SERUM CHOLESTEROL: CPT

## 2023-03-05 PROCEDURE — 700111 HCHG RX REV CODE 636 W/ 250 OVERRIDE (IP): Performed by: INTERNAL MEDICINE

## 2023-03-05 PROCEDURE — A9270 NON-COVERED ITEM OR SERVICE: HCPCS | Performed by: INTERNAL MEDICINE

## 2023-03-05 PROCEDURE — A9270 NON-COVERED ITEM OR SERVICE: HCPCS | Performed by: HOSPITALIST

## 2023-03-05 PROCEDURE — 82962 GLUCOSE BLOOD TEST: CPT

## 2023-03-05 PROCEDURE — 700102 HCHG RX REV CODE 250 W/ 637 OVERRIDE(OP): Performed by: HOSPITALIST

## 2023-03-05 PROCEDURE — 770004 HCHG ROOM/CARE - ONCOLOGY PRIVATE *

## 2023-03-05 RX ADMIN — FAMOTIDINE: 10 INJECTION, SOLUTION INTRAVENOUS at 22:24

## 2023-03-05 RX ADMIN — PIPERACILLIN AND TAZOBACTAM 4.5 G: 4; .5 INJECTION, POWDER, LYOPHILIZED, FOR SOLUTION INTRAVENOUS; PARENTERAL at 10:18

## 2023-03-05 RX ADMIN — LIDOCAINE HYDROCHLORIDE 30 ML: 20 SOLUTION OROPHARYNGEAL at 17:22

## 2023-03-05 RX ADMIN — QUETIAPINE FUMARATE 50 MG: 25 TABLET ORAL at 22:16

## 2023-03-05 RX ADMIN — SERTRALINE 100 MG: 100 TABLET, FILM COATED ORAL at 05:01

## 2023-03-05 RX ADMIN — MULTIPLE VITAMINS W/ MINERALS TAB 1 TABLET: TAB at 05:00

## 2023-03-05 RX ADMIN — Medication 5 MG: at 22:16

## 2023-03-05 RX ADMIN — ENOXAPARIN SODIUM 40 MG: 40 INJECTION SUBCUTANEOUS at 17:24

## 2023-03-05 RX ADMIN — PIPERACILLIN AND TAZOBACTAM 4.5 G: 4; .5 INJECTION, POWDER, LYOPHILIZED, FOR SOLUTION INTRAVENOUS; PARENTERAL at 00:43

## 2023-03-05 RX ADMIN — LIDOCAINE HYDROCHLORIDE 30 ML: 20 SOLUTION OROPHARYNGEAL at 05:01

## 2023-03-05 RX ADMIN — LIDOCAINE HYDROCHLORIDE 30 ML: 20 SOLUTION OROPHARYNGEAL at 22:16

## 2023-03-05 RX ADMIN — SERTRALINE 100 MG: 100 TABLET, FILM COATED ORAL at 17:24

## 2023-03-05 RX ADMIN — PIPERACILLIN AND TAZOBACTAM 4.5 G: 4; .5 INJECTION, POWDER, LYOPHILIZED, FOR SOLUTION INTRAVENOUS; PARENTERAL at 17:23

## 2023-03-05 ASSESSMENT — ENCOUNTER SYMPTOMS
NERVOUS/ANXIOUS: 0
SINUS PAIN: 0
ABDOMINAL PAIN: 1
NECK PAIN: 0
HEADACHES: 0
SORE THROAT: 0
SHORTNESS OF BREATH: 0
VOMITING: 0
CHILLS: 0
NAUSEA: 0
BLOOD IN STOOL: 0
HEMOPTYSIS: 0
DEPRESSION: 1
BACK PAIN: 0
MYALGIAS: 0
BRUISES/BLEEDS EASILY: 0
FLANK PAIN: 1
DIARRHEA: 0
EYE PAIN: 0
FEVER: 0
CONSTIPATION: 1

## 2023-03-05 ASSESSMENT — PAIN DESCRIPTION - PAIN TYPE: TYPE: ACUTE PAIN

## 2023-03-05 NOTE — PROGRESS NOTES
Pharmacy TPN Note for 3/5/2023     72 y.o. female on TPN day # 2      Admission History: Admitted on 2023 for Psoas abscess (HCC) [K68.12]  Sepsis (HCC) [A41.9]    Allergies:   Seasonal and Wellbutrin [bupropion hcl]     Indication for TPN:   Indication: Prolonged bowel rest;Severe malnutrition       Clinical Considerations for TPN:   Clinical Considerations Impacting TPN: Re-feeding syndrome           Temp (24hrs), Av.9 °C (98.4 °F), Min:36.5 °C (97.7 °F), Max:37.2 °C (98.9 °F)    Recent Labs     23  0139 23  0210 23  0102   SODIUM 139 142 138   POTASSIUM 3.7 3.3* 3.8   CHLORIDE 105 107 104   CO2 25 25 25   BUN 9 8 10   CREATININE 0.88 0.85 0.86   GLUCOSE 108* 108* 114*   CALCIUM 8.7 8.7 9.0   ASTSGOT  --   --  20   ALTSGPT  --   --  13   ALBUMIN  --   --  2.9*   TBILIRUBIN  --   --  0.2   PHOSPHORUS 3.0 2.7 2.7   MAGNESIUM 1.9 2.1 2.2     Accu-Checks  No results for input(s): POCGLUCOSE in the last 72 hours.    Vitals:    23 1530 23 0342 23 0755   BP: 121/74 135/79 127/73 (!) 140/73   Weight:       Height:           Intake/Output Summary (Last 24 hours) at 3/5/2023 1132  Last data filed at 3/5/2023 0755  Gross per 24 hour   Intake --   Output 4450 ml   Net -4450 ml       Orders Placed This Encounter   Procedures    Diet Order Diet: Full Liquid     Standing Status:   Standing     Number of Occurrences:   1     Order Specific Question:   Diet:     Answer:   Full Liquid [11]       TPN for past 72 hours (Show up to 3 orders; newest on the left.)       Start date and time    2023      TPN Adult Central Line [[id number]]    Order Status  Active    Last Admin  [place] [address] by [place] G Syfrett, R.N.    Frequency  TPN DAILY       Base    Clinisol  35 g    dextrose 70%  101 g    fat emulsion (soy) 20%  17 g       Additives    potassium phosphate  21 mmol    potassium chloride  50 mEq    sodium acetate  115 mEq    sodium chloride  162 mEq     magnesium sulfate  16 mEq    calcium GLUConate  4.65 mEq    M.T.E.-4 Tralement  1 mL    famotidine  20 mg    thiamine  100 mg    folic acid  1 mg       QS Base    sterile water  1,189.23 mL       Energy Contribution    Proteins  140 kcal    Dextrose  343.4 kcal    Lipids  170 kcal    Total  653.4 kcal       Electrolyte Ion Calculated Amount    Sodium  277 mEq    Potassium  80.8 mEq    Calcium  4.65 mEq    Magnesium  16 mEq    Aluminum  58.33 mEq    Phosphate  21 mmol    Chloride  212 mEq    Acetate  144.63 mEq    Chloride: Acetate Ratio  1.465       Other    Total Amino Acid  35 g    Total Amino Acid/kg  0.73 g/kg    Glucose Infusion Rate  1.46 mg/kg/min    Volume  1,800 mL    Rate  75 mL/hr    Dosing Weight  48 kg    Infusion Site  [facility]            TPN Requirements:  Weight Used in TPN Calculation: 48 kg (105 lb 13.1 oz)  Total Protein Needs (g/kg): 1.45 g/kg  Total Caloric Needs (kcal): 1300 kcal  Total Fluid Needs (mL): 1800 mL     Current TPN Formulation:  % of goal: 50  % kcal as lipids: 26  Grams protein/k.73  Non-protein calories: 483.4  Kcals/k.6  Total daily calories: 653.4    Comments:  1) Nutritional Plan: Appears to be tolerating TPN initiation. Minimal PO intake. Continue at 50% goal nutrition. Allow electrolytes to respond to TPN initiation prior to advancement with high risk for refeeding syndrome.   2) Labs: Electrolytes WNL. FSBS all < 150mg/dL.   3) Fluids/additives: Thiamine, folic acid supplementation for 3-5 days due to concern of refeeding syndrome.  4) Changes to formulation: none   5) Next labs/note: Routine TPN labs in am 3/6/23.      Cruz Todd, ConorD

## 2023-03-05 NOTE — PROGRESS NOTES
[facility] Medicine Daily Progress Note    Date of Service  3/5/2023    Chief Complaint  Mena Campos is a 72 y.o. female with GERD, MDD, EtOH use DO, and Left Renal / RP mass followed by oncologist Dr. Campos admitted 2/23/2023 with Right psoas abscess    Hospital Course  No notes on file    Mena Campos is a 72 y.o. female who presented 2/23/2023 with past medical history of repeated urine infection who comes into the hospital for right-sided flank pain.  This has been occurring for the past 2 months.  She was found to have right-sided kidney mass on January 10.  She had a biopsy of this mass on February 6.  She went to visit an oncologist today Dr. Campos who saw the biopsy results and noticed it being an abscess.  He sent her to the emergency room right away.  Repeat CT scan was completed found a psoas abscess.  General surgery recommended IR drainage.  Status post IR drain placed 2/24.  Cultures grew Citrobacter koseri.  ID was consulted.  Patient was started on Zosyn.    Repeat CT on 2/28 showed increase in size of psoas abscess measuring 3.9 x 4.7 cm with pigtail drainage catheter in place, trace blood fusions, splenomegaly, nonobstructive left renal stones, mesenteric and body wall edema.  C. difficile is negative.    Discussed with interventional radiology Dr. Sandoval.  Psoas abscess was multiloculated.  Drain has minimal output and was discontinued on 3/1.  Remaining abscess is loculated and not connected to the drain, it is currently too small to place a new drain and will hopefully resolve with antibiotics.    Hospital course was also complicated by alcohol withdrawal.    PICC line was placed on 3/4 and patient was started on TPN.    Interval Problem Update  Patient was seen and examined at bedside.  I have personally reviewed and interpreted vitals, labs, and imaging.    2/28.  Afebrile.  Slightly hypertensive.  On 0-1 L nasal cannula.  Leukocytosis trending down to 11.7.  Repeat potassium,  magnesium, phosphorus.  Denies fever, chills, chest pains, shortness of breath.  Has been having abdominal cramping and loose bowel movements.  Screen for C. difficile.  Continue Zosyn for now.  De-escalated back to clear liquid diet as patient having severe abdominal pain.  3/1.  Afebrile.  Slightly hypertensive.  On room air.  Leukocytosis resolved.  Replete K, Mg.  Start maintenance fluids denies fevers, chills, chest pains, shortness of breath.  Patient still have abdominal cramping but this is improved from yesterday.  Tolerating liquid diet.  Discussed TPN with patient and concern for malnutrition versus risks of TPN.  Patient would like to hold off on TPN and continue to try oral diet for now.  DC psoas drain today.  ID recommends 1 more week of IV antibiotics followed by oral.  3/2.  Afebrile.  Stable vitals.  On room air.  Replete K, mag, phos.  Denies fevers, chills, chest pains, shortness of breath.  Had persistent abdominal cramping and diarrhea yesterday.  Reports he does feel better today but she has not tried eating yet.  States she feels better this morning and would like to try clear liquids again.  Would like to hold off on TPN as long as possible.  Discussed with ID.  Plan for repeat CT abdomen pelvis on 3/7.  Continue IV Zosyn until then.  If abscess is resolved can switch to oral antibiotics.  3/3.  Afebrile.  Stable vitals.  On room air.  Replete mag, K.  Denies fever, chills, chest pains, shortness of breath.  Patient still did not eat well yesterday with persistent abdominal cramping and diarrhea.  Did do better with clear liquid diet this morning and seems to be making progress in the bilateral.  Discussed with patient and dietary.  We will plan to advance to full liquids tomorrow if patient can tolerate clears better.  Patient considering TPN if she cannot tolerate diet.  Repeat CT on 3/7 to evaluate psoas abscess, colitis.  Continue Zosyn  3/4.  Afebrile.  Stable vitals.  On room air.   Replete mag, k, Phos.  Denies fever, chills, chest pains, shortness of breath.  Yesterday afternoon she did have abdominal cramping and some diarrhea.  She has made limited progress while on clears.  She is agreeable to start TPN.  Ordered PICC.  Advance to full liquid diet.  We will continue to monitor oral intake and abdominal cramping.  3/5.  Afebrile.  Stable vitals.  On room air.  Electrolytes will be repleted by TPN managed by pharmacy.  Denies fever, chills, chest pains, shortness of breath.  Tolerated falls yesterday.  States abdominal cramping is about the same as when she was on clears.  Still has diarrhea which is persistent, but had a stool that was a little more solid this morning.  We will keep on full's for now.  Continue TPN.  Patient concerned that psoas abscess is reaccumulating.  Ordered ultrasound.  Repeat CT supposed to be around 3/7.    I have discussed this patient's plan of care and discharge plan at IDT rounds today with Case Management, Nursing, Nursing leadership, and other members of the IDT team.    Consultants/Specialty  general surgery, infectious disease, and interventional radiology    Code Status  Full Code    Disposition  Patient is not medically cleared for discharge.   Anticipate discharge to to home with organized home healthcare and close outpatient follow-up.  I have placed the appropriate orders for post-discharge needs.    Review of Systems  Review of Systems   Constitutional:  Positive for malaise/fatigue. Negative for chills and fever.   HENT:  Negative for ear pain, nosebleeds, sinus pain and sore throat.    Eyes:  Negative for pain.   Respiratory:  Negative for hemoptysis and shortness of breath.    Cardiovascular:  Negative for chest pain and leg swelling.   Gastrointestinal:  Positive for abdominal pain and constipation. Negative for blood in stool, diarrhea, melena, nausea and vomiting.   Genitourinary:  Positive for flank pain. Negative for dysuria and hematuria.    Musculoskeletal:  Negative for back pain, joint pain, myalgias and neck pain.   Neurological:  Negative for headaches.   Endo/Heme/Allergies:  Does not bruise/bleed easily.   Psychiatric/Behavioral:  Positive for depression. The patient is not nervous/anxious.       Physical Exam  Temp:  [36.5 °C (97.7 °F)-37.2 °C (98.9 °F)] 36.5 °C (97.7 °F)  Pulse:  [70-84] 70  Resp:  [18] 18  BP: (121-135)/(73-79) 127/73  SpO2:  [98 %-99 %] 98 %    Physical Exam  Vitals and nursing note reviewed. Exam conducted with a chaperone present.   Constitutional:       Appearance: She is cachectic. She is ill-appearing (Chronically).   HENT:      Head: Normocephalic and atraumatic.      Comments: Bitemporal wasting     Right Ear: External ear normal.      Left Ear: External ear normal.      Nose: Nose normal.      Mouth/Throat:      Mouth: Mucous membranes are dry.   Eyes:      Extraocular Movements: Extraocular movements intact.      Conjunctiva/sclera: Conjunctivae normal.   Cardiovascular:      Rate and Rhythm: Normal rate and regular rhythm.      Pulses: Normal pulses.      Heart sounds: Normal heart sounds. No murmur heard.    No friction rub. No gallop.   Pulmonary:      Effort: Pulmonary effort is normal. No respiratory distress.      Breath sounds: Normal breath sounds. No wheezing, rhonchi or rales.   Abdominal:      General: Abdomen is flat. Bowel sounds are normal. There is no distension.      Palpations: Abdomen is soft.      Tenderness: There is abdominal tenderness. There is no guarding or rebound.   Genitourinary:     Comments: +Ferrera, urine yellow / clear  Musculoskeletal:      Cervical back: Neck supple.      Right lower leg: No edema.      Left lower leg: No edema.   Skin:     General: Skin is warm.   Neurological:      General: No focal deficit present.      Mental Status: She is alert and oriented to person, place, and time. Mental status is at baseline.      Cranial Nerves: No cranial nerve deficit.      Motor: No  tremor.   Psychiatric:         Attention and Perception: Attention and perception normal.         Mood and Affect: Mood normal. Affect is blunt.         Speech: Speech normal.         Behavior: Behavior normal. Behavior is cooperative.       Fluids    Intake/Output Summary (Last 24 hours) at 3/5/2023 0719  Last data filed at 3/5/2023 0342  Gross per 24 hour   Intake --   Output 4300 ml   Net -4300 ml         Laboratory  Recent Labs     03/03/23 0139 03/04/23  0210   WBC 6.7 6.5   RBC 3.75* 4.06*   HEMOGLOBIN 9.5* 10.2*   HEMATOCRIT 29.7* 32.1*   MCV 79.2* 79.1*   MCH 25.3* 25.1*   MCHC 32.0* 31.8*   RDW 51.2* 51.2*   PLATELETCT 334 374   MPV 11.6 11.7       Recent Labs     03/03/23 0139 03/04/23 0210 03/05/23  0102   SODIUM 139 142 138   POTASSIUM 3.7 3.3* 3.8   CHLORIDE 105 107 104   CO2 25 25 25   GLUCOSE 108* 108* 114*   BUN 9 8 10   CREATININE 0.88 0.85 0.86   CALCIUM 8.7 8.7 9.0                       Imaging  IR-PICC LINE PLACEMENT W/ GUIDANCE > AGE 5   Final Result                  Ultrasound-guided PICC placement performed by qualified nursing staff as    above.          CT-ABDOMEN-PELVIS WITH   Final Result      1.  Marked interval decrease in size of large right psoas, posterior pararenal and subcutaneous right flank abscess. There is percutaneous pigtail drainage catheter in place. There is residual abscess in the subcutaneous tissues measuring about 3.9 x 4.7    cm.   2.  Trace pleural effusions.   3.  Splenomegaly.   4.  Nonobstructing left renal stones.   5.  Limited evaluation for bowel wall thickening due to decompression. There could be sigmoid colon wall thickening and some scattered fluid within the colon. Correlate for colitis. No bowel obstruction.   6.  Mesenteric and body wall edema.      CT-DRAIN-RETROPERITONEAL   Final Result      1.  CT guided placement of a percutaneous drainage catheter in a right psoas fluid collection.   2.  The current plan is to obtain a follow-up CT scan in 5-7  days.      US-EXTREMITY VENOUS LOWER BILAT   Final Result      OUTSIDE IMAGES-CT ABDOMEN /PELVIS   Final Result      IR-[id number] VIEW   Final Result      Loops of bowel and colon are somewhat indistinct, possibly related to technique. Appearance appears overall somewhat similar to outside facility CT abdomen pelvis from 2/23/2023. If symptoms have changed from recent CT, CT evaluation is recommended.             Assessment/Plan  * Psoas abscess (HCC)- (present on admission)  Assessment & Plan  Presented to [facility] with Right flank pain  CT demonstrated psoas abscess prompting transfer to [facility] for IR  General surgery consulted and s/o, recommended IR-guided drainage  IR-guided drainage 2/24/23, Repeat CT 2/28/23  RADHA output < 50 cc, requested RN flushes BID and record output  Abscess culture +Citrobacter koseri, pan-susceptible  Continue zosyn for now  ID consulted for antibiotic planning after source control  HIV negative    Drain discontinued 3/1  Repeat CT on 3/7.    Urinary retention  Assessment & Plan  Likely due to oxybutynin - discontinued  No UA from prior to haq placement  Zosyn expected to cover most urine pathogens - culture deferred  Trial haq discontinuation prior to discharge, if unsuccessful will refer to urology  Hold off on voiding trial for now as patient still having significant abdominal pain and diarrhea    External hemorrhoids  Assessment & Plan  Bowel protocol - senna QHS PRN, miralax PRN    Other dysphagia- (present on admission)  Assessment & Plan  Resolved  SLP evaluation  Modified barium swallow deferred per SLP recommendation    Retroperitoneal mass- (present on admission)  Assessment & Plan  Underwent biopsy of Left renal pole mass extending into RP, it was not indicative of malignancy  Follow up with Oncologist Dr. Campos for further diagnostic evaluation after discharge    Hypokalemia- (present on admission)  Assessment & Plan  Resolved  Likely due to inadequate PO intake from EtOH use  DO  Mg WNL  Kdur 40 daily  Repeat AM BMP    Hyponatremia- (present on admission)  Assessment & Plan  Mild, recurrent  Likely due to poor PO intake from EtOH use DO  Repeat AM BMP    Microcytic anemia- (present on admission)  Assessment & Plan  Follows with Heme-Onc Dr. Campos  Elevated ferritin indicative of AOCD  Transfuse for Hgb <7    Alcohol withdrawal syndrome, with delirium (HCC)- (present on admission)  Assessment & Plan  Resolved  Reports >2 beers daily, most recent 2/22/23  Developed diaphoresis, delirium, tremor, tachycardia on HD#2  CIWA-Lorazepam protocol  Empiric MVN + B12 + Folate + Thiamine  Will discuss MAT / AA prior to discharge    Leg swelling- (present on admission)  Assessment & Plan  BLE US negative for DVT  TTE deferred    Acute encephalopathy- (present on admission)  Assessment & Plan  Resolved  Likely multifactorial including underlying abscess and EtOH withdrawal - see separate plans  Nonfocal, CTH deferred    Sepsis with encephalopathy without septic shock (HCC)- (present on admission)  Assessment & Plan  This is Sepsis Present on admission  SIRS criteria identified on my evaluation include: Fever, with temperature greater than 101 deg F, Tachypnea, with respirations greater than 20 per minute and Leukocytosis, with WBC greater than 12,000  Source is right psoas abscess  Sepsis protocol initiated  Fluid resuscitation ordered per protocol  Crystalloid Fluid Administration: Fluid resuscitation ordered per standard protocol - 30 mL/kg per current or ideal body weight  IV antibiotics as appropriate for source of sepsis  Reassessment: I have reassessed the patient's hemodynamic status    BCx 2/23: NGTD  Abscess Cx 2/24: +Citrobacter koseri  Continue zosyn for intra-abdominal abscess  Leukocytosis improving  ID consulted for antibiotic planning after source control    Gastroesophageal reflux disease with esophagitis- (present on admission)  Assessment & Plan  Likely exacerbated by EtOH use  DO  Continue omeprazole    OAB (overactive bladder)- (present on admission)  Assessment & Plan  Discontinued oxybutynin due to urinary retention    Moderate episode of recurrent major depressive disorder (HCC)- (present on admission)  Assessment & Plan  Continue sertraline       VTE prophylaxis: SCDs/TEDs and enoxaparin ppx    I have performed a physical exam and reviewed and updated ROS and Plan today (3/5/2023). In review of yesterday's note (3/4/2023), there are no changes except as documented above.

## 2023-03-05 NOTE — CARE PLAN
The patient is Watcher - Medium risk of patient condition declining or worsening    Shift Goals  Clinical Goals: IV abx, rest, monitor BM, TPN  Patient Goals: Rest  Family Goals: none present    Progress made toward(s) clinical / shift goals:    Problem: Knowledge Deficit - Standard  Goal: Patient and family/care givers will demonstrate understanding of plan of care, disease process/condition, diagnostic tests and medications  Outcome: Progressing  Note: Pt updated on plan of care, pt states understanding for plan of TPN to improve nutritional intake. TPN initiated, IV abx administered, all questions answered at this time.      Problem: Fall Risk  Goal: Patient will remain free from falls  Outcome: Progressing  Note: Bed alarm on, bed locked and in lowest position, personal belongings within reach, call light within reach, nonslip socks on, pt educated on use of call light, pt room close to nursing station.      Problem: Skin Integrity  Goal: Skin integrity is maintained or improved  Outcome: Progressing  Note: Barrier cream applied to sacrum, waffle overlay in use, mepilex to heels, pillows in use to float heels.        Patient is not progressing towards the following goals: N/A

## 2023-03-05 NOTE — CARE PLAN
The patient is Watcher - Medium risk of patient condition declining or worsening    Shift Goals  Clinical Goals: ABX treatment  Patient Goals: skin care, rest  Family Goals: none present    Progress made toward(s) clinical / shift goals:    Problem: Knowledge Deficit - Standard  Goal: Patient and family/care givers will demonstrate understanding of plan of care, disease process/condition, diagnostic tests and medications  Description: Target End Date:  1-3 days or as soon as patient condition allows    Document in Patient Education    1.  Patient and family/caregiver oriented to unit, equipment, visitation policy and means for communicating concern  2.  Complete/review Learning Assessment  3.  Assess knowledge level of disease process/condition, treatment plan, diagnostic tests and medications  4.  Explain disease process/condition, treatment plan, diagnostic tests and medications  Outcome: Progressing  Note: Patient understands plan of care and the need for a PICC line. Patient understands that new line will be used to deliver medications and feedings.      Problem: Fall Risk  Goal: Patient will remain free from falls  Description: Target End Date:  Prior to discharge or change in level of care    Document interventions on the Jaimie Higginbotham Fall Risk Assessment    1.  Assess for fall risk factors  2.  Implement fall precautions  Outcome: Progressing  Note: Patient remained free from falls during my shift. Patient uses call button appropriately.

## 2023-03-06 LAB
ALBUMIN SERPL BCP-MCNC: 3.5 G/DL (ref 3.2–4.9)
ALBUMIN/GLOB SERPL: 1 G/DL
ALP SERPL-CCNC: 79 U/L (ref 30–99)
ALT SERPL-CCNC: 15 U/L (ref 2–50)
ANION GAP SERPL CALC-SCNC: 11 MMOL/L (ref 7–16)
AST SERPL-CCNC: 18 U/L (ref 12–45)
BILIRUB SERPL-MCNC: 0.2 MG/DL (ref 0.1–1.5)
BUN SERPL-MCNC: 13 MG/DL (ref 8–22)
CALCIUM ALBUM COR SERPL-MCNC: 10 MG/DL (ref 8.5–10.5)
CALCIUM SERPL-MCNC: 9.6 MG/DL (ref 8.5–10.5)
CHLORIDE SERPL-SCNC: 98 MMOL/L (ref 96–112)
CO2 SERPL-SCNC: 24 MMOL/L (ref 20–33)
CREAT SERPL-MCNC: 0.82 MG/DL (ref 0.5–1.4)
ERYTHROCYTE [DISTWIDTH] IN BLOOD BY AUTOMATED COUNT: 54.8 FL (ref 35.9–50)
GFR SERPLBLD CREATININE-BSD FMLA CKD-EPI: 76 ML/MIN/1.73 M 2
GLOBULIN SER CALC-MCNC: 3.5 G/DL (ref 1.9–3.5)
GLUCOSE BLD STRIP.AUTO-MCNC: 107 MG/DL (ref 65–99)
GLUCOSE BLD STRIP.AUTO-MCNC: 111 MG/DL (ref 65–99)
GLUCOSE BLD STRIP.AUTO-MCNC: 120 MG/DL (ref 65–99)
GLUCOSE SERPL-MCNC: 112 MG/DL (ref 65–99)
HCT VFR BLD AUTO: 35.1 % (ref 37–47)
HGB BLD-MCNC: 11.1 G/DL (ref 12–16)
MAGNESIUM SERPL-MCNC: 2.3 MG/DL (ref 1.5–2.5)
MCH RBC QN AUTO: 25.8 PG (ref 27–33)
MCHC RBC AUTO-ENTMCNC: 31.6 G/DL (ref 33.6–35)
MCV RBC AUTO: 81.4 FL (ref 81.4–97.8)
PHOSPHATE SERPL-MCNC: 3.3 MG/DL (ref 2.5–4.5)
PLATELET # BLD AUTO: 333 K/UL (ref 164–446)
PMV BLD AUTO: 11 FL (ref 9–12.9)
POTASSIUM SERPL-SCNC: 4 MMOL/L (ref 3.6–5.5)
PREALB SERPL-MCNC: 22.1 MG/DL (ref 18–38)
PROT SERPL-MCNC: 7 G/DL (ref 6–8.2)
RBC # BLD AUTO: 4.31 M/UL (ref 4.2–5.4)
SODIUM SERPL-SCNC: 133 MMOL/L (ref 135–145)
TRIGL SERPL-MCNC: 95 MG/DL (ref 0–149)
WBC # BLD AUTO: 9.5 K/UL (ref 4.8–10.8)

## 2023-03-06 PROCEDURE — 700102 HCHG RX REV CODE 250 W/ 637 OVERRIDE(OP): Performed by: HOSPITALIST

## 2023-03-06 PROCEDURE — 82962 GLUCOSE BLOOD TEST: CPT | Mod: 91

## 2023-03-06 PROCEDURE — 700102 HCHG RX REV CODE 250 W/ 637 OVERRIDE(OP): Performed by: STUDENT IN AN ORGANIZED HEALTH CARE EDUCATION/TRAINING PROGRAM

## 2023-03-06 PROCEDURE — 99233 SBSQ HOSP IP/OBS HIGH 50: CPT | Performed by: INTERNAL MEDICINE

## 2023-03-06 PROCEDURE — 700111 HCHG RX REV CODE 636 W/ 250 OVERRIDE (IP): Performed by: INTERNAL MEDICINE

## 2023-03-06 PROCEDURE — A9270 NON-COVERED ITEM OR SERVICE: HCPCS | Performed by: INTERNAL MEDICINE

## 2023-03-06 PROCEDURE — 36415 COLL VENOUS BLD VENIPUNCTURE: CPT

## 2023-03-06 PROCEDURE — 700105 HCHG RX REV CODE 258: Performed by: INTERNAL MEDICINE

## 2023-03-06 PROCEDURE — 700101 HCHG RX REV CODE 250: Performed by: INTERNAL MEDICINE

## 2023-03-06 PROCEDURE — 80053 COMPREHEN METABOLIC PANEL: CPT

## 2023-03-06 PROCEDURE — 85027 COMPLETE CBC AUTOMATED: CPT

## 2023-03-06 PROCEDURE — 84134 ASSAY OF PREALBUMIN: CPT

## 2023-03-06 PROCEDURE — 84100 ASSAY OF PHOSPHORUS: CPT

## 2023-03-06 PROCEDURE — 700102 HCHG RX REV CODE 250 W/ 637 OVERRIDE(OP): Performed by: INTERNAL MEDICINE

## 2023-03-06 PROCEDURE — 84478 ASSAY OF TRIGLYCERIDES: CPT

## 2023-03-06 PROCEDURE — A9270 NON-COVERED ITEM OR SERVICE: HCPCS | Performed by: STUDENT IN AN ORGANIZED HEALTH CARE EDUCATION/TRAINING PROGRAM

## 2023-03-06 PROCEDURE — 770004 HCHG ROOM/CARE - ONCOLOGY PRIVATE *

## 2023-03-06 PROCEDURE — A9270 NON-COVERED ITEM OR SERVICE: HCPCS | Performed by: HOSPITALIST

## 2023-03-06 PROCEDURE — 83735 ASSAY OF MAGNESIUM: CPT

## 2023-03-06 RX ADMIN — ENOXAPARIN SODIUM 40 MG: 40 INJECTION SUBCUTANEOUS at 17:40

## 2023-03-06 RX ADMIN — PIPERACILLIN AND TAZOBACTAM 4.5 G: 4; .5 INJECTION, POWDER, LYOPHILIZED, FOR SOLUTION INTRAVENOUS; PARENTERAL at 00:14

## 2023-03-06 RX ADMIN — SERTRALINE 100 MG: 100 TABLET, FILM COATED ORAL at 05:18

## 2023-03-06 RX ADMIN — LIDOCAINE HYDROCHLORIDE 30 ML: 20 SOLUTION OROPHARYNGEAL at 14:27

## 2023-03-06 RX ADMIN — Medication 5 MG: at 21:35

## 2023-03-06 RX ADMIN — PIPERACILLIN AND TAZOBACTAM 4.5 G: 4; .5 INJECTION, POWDER, LYOPHILIZED, FOR SOLUTION INTRAVENOUS; PARENTERAL at 17:40

## 2023-03-06 RX ADMIN — QUETIAPINE FUMARATE 50 MG: 25 TABLET ORAL at 21:34

## 2023-03-06 RX ADMIN — FAMOTIDINE: 10 INJECTION, SOLUTION INTRAVENOUS at 21:39

## 2023-03-06 RX ADMIN — PIPERACILLIN AND TAZOBACTAM 4.5 G: 4; .5 INJECTION, POWDER, LYOPHILIZED, FOR SOLUTION INTRAVENOUS; PARENTERAL at 08:35

## 2023-03-06 RX ADMIN — LIDOCAINE HYDROCHLORIDE 30 ML: 20 SOLUTION OROPHARYNGEAL at 05:18

## 2023-03-06 RX ADMIN — LIDOCAINE HYDROCHLORIDE 30 ML: 20 SOLUTION OROPHARYNGEAL at 21:34

## 2023-03-06 RX ADMIN — MULTIPLE VITAMINS W/ MINERALS TAB 1 TABLET: TAB at 05:18

## 2023-03-06 RX ADMIN — SERTRALINE 100 MG: 100 TABLET, FILM COATED ORAL at 17:40

## 2023-03-06 ASSESSMENT — ENCOUNTER SYMPTOMS
EYE PAIN: 0
VOMITING: 0
BLOOD IN STOOL: 0
SINUS PAIN: 0
SORE THROAT: 0
ABDOMINAL PAIN: 1
NAUSEA: 0
HEADACHES: 0
FEVER: 0
FLANK PAIN: 1
CONSTIPATION: 1
HEMOPTYSIS: 0
BACK PAIN: 0
SHORTNESS OF BREATH: 0
NECK PAIN: 0
BRUISES/BLEEDS EASILY: 0
CHILLS: 0
NERVOUS/ANXIOUS: 0
MYALGIAS: 0
DEPRESSION: 1
DIARRHEA: 0

## 2023-03-06 ASSESSMENT — PAIN DESCRIPTION - PAIN TYPE: TYPE: ACUTE PAIN

## 2023-03-06 NOTE — CARE PLAN
The patient is Watcher - Medium risk of patient condition declining or worsening    Shift Goals  Clinical Goals: IV abx, Administer TPN  Patient Goals: Rest  Family Goals: none present    Patient educated on TPN and PICC line. Patient had 725 mL of urine output from haq.     Progress made toward(s) clinical / shift goals:    Problem: Knowledge Deficit - Standard  Goal: Patient and family/care givers will demonstrate understanding of plan of care, disease process/condition, diagnostic tests and medications  Outcome: Progressing     Problem: Urinary - Renal Perfusion  Goal: Ability to achieve and maintain adequate renal perfusion and functioning will improve  Outcome: Progressing       Patient is not progressing towards the following goals:

## 2023-03-06 NOTE — CARE PLAN
Problem: Knowledge Deficit - Standard  Goal: Patient and family/care givers will demonstrate understanding of plan of care, disease process/condition, diagnostic tests and medications  Outcome: Progressing     Problem: Hemodynamics  Goal: Patient's hemodynamics, fluid balance and neurologic status will be stable or improve  Outcome: Progressing     Problem: Urinary - Renal Perfusion  Goal: Ability to achieve and maintain adequate renal perfusion and functioning will improve  Outcome: Progressing     Problem: Respiratory  Goal: Patient will achieve/maintain optimum respiratory ventilation and gas exchange  Outcome: Progressing   The patient is Stable - Low risk of patient condition declining or worsening    Shift Goals  Clinical Goals: IV abx, rest, monitor BM, TPN  Patient Goals: Rest  Family Goals: none present    Progress made toward(s) clinical / shift goals:  no pain    Patient is not progressing towards the following goals:none

## 2023-03-06 NOTE — PROGRESS NOTES
Pharmacy TPN Note for 3/6/2023     72 y.o. female on TPN day # 3      Admission History: Admitted on 2023 for Psoas abscess (HCC) [K68.12]  Sepsis (HCC) [A41.9]    Allergies:   Seasonal and Wellbutrin [bupropion hcl]     Indication for TPN:   Indication: Prolonged bowel rest;Severe malnutrition       Clinical Considerations for TPN:   Clinical Considerations Impacting TPN: Re-feeding syndrome           Temp (24hrs), Av.8 °C (98.3 °F), Min:36.7 °C (98 °F), Max:37 °C (98.6 °F)    Recent Labs     23  0210 23  0102 23  0003   SODIUM 142 138 133*   POTASSIUM 3.3* 3.8 4.0   CHLORIDE 107 104 98   CO2 25 25 24   BUN 8 10 13   CREATININE 0.85 0.86 0.82   GLUCOSE 108* 114* 112*   CALCIUM 8.7 9.0 9.6   ASTSGOT  --  20 18   ALTSGPT  --  13 15   ALBUMIN  --  2.9* 3.5   TBILIRUBIN  --  0.2 0.2   PHOSPHORUS 2.7 2.7 3.3   MAGNESIUM 2.1 2.2 2.3     Accu-Checks  No results for input(s): POCGLUCOSE in the last 72 hours.    Vitals:    23 1500 23 1635 23 0527   BP: 100/62 132/85 126/86 (!) 138/95   Weight:       Height:           Intake/Output Summary (Last 24 hours) at 3/6/2023 0817  Last data filed at 3/6/2023 0527  Gross per 24 hour   Intake --   Output 2600 ml   Net -2600 ml       Orders Placed This Encounter   Procedures    Diet Order Diet: Full Liquid     Standing Status:   Standing     Number of Occurrences:   1     Order Specific Question:   Diet:     Answer:   Full Liquid [11]       TPN for past 72 hours (Show up to 3 orders; newest on the left.)       Start date and time    2023      TPN Adult Central Line [[id number]]    Order Status  Active    Last Admin  New Bag at 20234 by Ronaldo Duran R.N.    Frequency  TPN DAILY       Base    Clinisol  35 g    dextrose 70%  101 g    fat emulsion (soy) 20%  17 g       Additives    potassium phosphate  21 mmol    potassium chloride  50 mEq    sodium acetate  115 mEq    sodium chloride  162 mEq    magnesium  sulfate  16 mEq    calcium GLUConate  4.65 mEq    M.T.E.-4 Tralement  1 mL    famotidine  20 mg    thiamine  100 mg    folic acid  1 mg       QS Base    sterile water  1,189.23 mL       Energy Contribution    Proteins  140 kcal    Dextrose  343.4 kcal    Lipids  170 kcal    Total  653.4 kcal       Electrolyte Ion Calculated Amount    Sodium  277 mEq    Potassium  80.8 mEq    Calcium  4.65 mEq    Magnesium  16 mEq    Aluminum  58.33 mEq    Phosphate  21 mmol    Chloride  212 mEq    Acetate  144.63 mEq    Chloride: Acetate Ratio  1.465       Other    Total Amino Acid  35 g    Total Amino Acid/kg  0.73 g/kg    Glucose Infusion Rate  1.46 mg/kg/min    Volume  1,800 mL    Rate  75 mL/hr    Dosing Weight  48 kg    Infusion Site  Central            TPN Requirements:  Weight Used in TPN Calculation: 48 kg (105 lb 13.1 oz)  Total Protein Needs (g/kg): 1.5 g/kg  Total Caloric Needs (kcal): 1300 kcal  Total Fluid Needs (mL): 1800 mL     Current TPN Formulation:  % of goal: 50  % kcal as lipids: 26  Grams protein/k.73  Non-protein calories: 483.4  Kcals/k.6  Total daily calories: 653.4    Comments:  1) Nutritional Plan: Appears to be tolerating TPN initiation. No s/s of refeeding syndrome. Minimal PO intake. Only lunch charted 3/5/23 at % consumed. Continue at 50% goal nutrition. Allow one more full 24 hour period on TPN prior to advancement with high risk for refeeding syndrome.   2) Labs: Electrolytes WNL. Except sodium slightly below normal range. TPN is at NSS equivalent. FSBS all < 150mg/dL.   3) Fluids/additives: Thiamine, folic acid supplementation day 3 of 3 planned due to concern for refeeding syndrome.  4) Changes to formulation: none   5) Next labs/note: Routine TPN labs in am 3/7/23.      Cruz Todd, ConorD

## 2023-03-06 NOTE — PROGRESS NOTES
[facility] Medicine Daily Progress Note    Date of Service  3/6/2023    Chief Complaint  Mena Campos is a 72 y.o. female with GERD, MDD, EtOH use DO, and Left Renal / RP mass followed by oncologist Dr. Campos admitted 2/23/2023 with Right psoas abscess    Hospital Course  No notes on file    Mena Campos is a 72 y.o. female who presented 2/23/2023 with past medical history of repeated urine infection who comes into the hospital for right-sided flank pain.  This has been occurring for the past 2 months.  She was found to have right-sided kidney mass on January 10.  She had a biopsy of this mass on February 6.  She went to visit an oncologist today Dr. Campos who saw the biopsy results and noticed it being an abscess.  He sent her to the emergency room right away.  Repeat CT scan was completed found a psoas abscess.  General surgery recommended IR drainage.  Status post IR drain placed 2/24.  Cultures grew Citrobacter koseri.  ID was consulted.  Patient was started on Zosyn.    Repeat CT on 2/28 showed increase in size of psoas abscess measuring 3.9 x 4.7 cm with pigtail drainage catheter in place, trace blood fusions, splenomegaly, nonobstructive left renal stones, mesenteric and body wall edema.  C. difficile is negative.    Discussed with interventional radiology Dr. Sandoval.  Psoas abscess was multiloculated.  Drain has minimal output and was discontinued on 3/1.  Remaining abscess is loculated and not connected to the drain, it is currently too small to place a new drain and will hopefully resolve with antibiotics.    Hospital course was also complicated by alcohol withdrawal.    PICC line was placed on 3/4 and patient was started on TPN.    Interval Problem Update  Patient was seen and examined at bedside.  I have personally reviewed and interpreted vitals, labs, and imaging.    2/28.  Afebrile.  Slightly hypertensive.  On 0-1 L nasal cannula.  Leukocytosis trending down to 11.7.  Repeat potassium,  magnesium, phosphorus.  Denies fever, chills, chest pains, shortness of breath.  Has been having abdominal cramping and loose bowel movements.  Screen for C. difficile.  Continue Zosyn for now.  De-escalated back to clear liquid diet as patient having severe abdominal pain.  3/1.  Afebrile.  Slightly hypertensive.  On room air.  Leukocytosis resolved.  Replete K, Mg.  Start maintenance fluids denies fevers, chills, chest pains, shortness of breath.  Patient still have abdominal cramping but this is improved from yesterday.  Tolerating liquid diet.  Discussed TPN with patient and concern for malnutrition versus risks of TPN.  Patient would like to hold off on TPN and continue to try oral diet for now.  DC psoas drain today.  ID recommends 1 more week of IV antibiotics followed by oral.  3/2.  Afebrile.  Stable vitals.  On room air.  Replete K, mag, phos.  Denies fevers, chills, chest pains, shortness of breath.  Had persistent abdominal cramping and diarrhea yesterday.  Reports he does feel better today but she has not tried eating yet.  States she feels better this morning and would like to try clear liquids again.  Would like to hold off on TPN as long as possible.  Discussed with ID.  Plan for repeat CT abdomen pelvis on 3/7.  Continue IV Zosyn until then.  If abscess is resolved can switch to oral antibiotics.  3/3.  Afebrile.  Stable vitals.  On room air.  Replete mag, K.  Denies fever, chills, chest pains, shortness of breath.  Patient still did not eat well yesterday with persistent abdominal cramping and diarrhea.  Did do better with clear liquid diet this morning and seems to be making progress in the bilateral.  Discussed with patient and dietary.  We will plan to advance to full liquids tomorrow if patient can tolerate clears better.  Patient considering TPN if she cannot tolerate diet.  Repeat CT on 3/7 to evaluate psoas abscess, colitis.  Continue Zosyn  3/4.  Afebrile.  Stable vitals.  On room air.   Replete mag, k, Phos.  Denies fever, chills, chest pains, shortness of breath.  Yesterday afternoon she did have abdominal cramping and some diarrhea.  She has made limited progress while on clears.  She is agreeable to start TPN.  Ordered PICC.  Advance to full liquid diet.  We will continue to monitor oral intake and abdominal cramping.  3/5.  Afebrile.  Stable vitals.  On room air.  Electrolytes will be repleted by TPN managed by pharmacy.  Denies fever, chills, chest pains, shortness of breath.  Tolerated falls yesterday.  States abdominal cramping is about the same as when she was on clears.  Still has diarrhea which is persistent, but had a stool that was a little more solid this morning.  We will keep on full's for now.  Continue TPN.  Patient concerned that psoas abscess is reaccumulating.  Ordered ultrasound.  Repeat CT supposed to be around 3/7 per ID  3/6.  Afebrile.  Episode of tachycardia last night resolved.  On room air.  Denies fevers, chills, chest pains, shortness of breath.  Patient is tolerating full liquids better.  She did have 2 bowel movements this morning and actually made it in the commode instead of the bed.  Patient is frustrated because she is n.p.o. this morning for another abscess drain.  Counts about results of ultrasound showing reaccumulation of psoas abscess.  IR has been consulted.  Continue Zosyn for now.  We will patient continues to tolerate her diet and make improvements.  Currently getting 50% nutrition from TPN.  With small progress patient is hopeful to be on solids soon.    I have discussed this patient's plan of care and discharge plan at IDT rounds today with Case Management, Nursing, Nursing leadership, and other members of the IDT team.    Consultants/Specialty  general surgery, infectious disease, and interventional radiology    Code Status  Full Code    Disposition  Patient is not medically cleared for discharge.   Anticipate discharge to to home with organized home  healthcare and close outpatient follow-up.  I have placed the appropriate orders for post-discharge needs.    Review of Systems  Review of Systems   Constitutional:  Positive for malaise/fatigue. Negative for chills and fever.   HENT:  Negative for ear pain, nosebleeds, sinus pain and sore throat.    Eyes:  Negative for pain.   Respiratory:  Negative for hemoptysis and shortness of breath.    Cardiovascular:  Negative for chest pain and leg swelling.   Gastrointestinal:  Positive for abdominal pain and constipation. Negative for blood in stool, diarrhea, melena, nausea and vomiting.   Genitourinary:  Positive for flank pain. Negative for dysuria and hematuria.   Musculoskeletal:  Negative for back pain, joint pain, myalgias and neck pain.   Neurological:  Negative for headaches.   Endo/Heme/Allergies:  Does not bruise/bleed easily.   Psychiatric/Behavioral:  Positive for depression. The patient is not nervous/anxious.       Physical Exam  Temp:  [36.7 °C (98 °F)-37.1 °C (98.7 °F)] 36.7 °C (98.1 °F)  Pulse:  [] 85  Resp:  [15-18] 17  BP: (100-140)/(62-95) 138/95  SpO2:  [96 %-100 %] 96 %    Physical Exam  Vitals and nursing note reviewed. Exam conducted with a chaperone present.   Constitutional:       Appearance: She is cachectic. She is ill-appearing (Chronically).   HENT:      Head: Normocephalic and atraumatic.      Comments: Bitemporal wasting     Right Ear: External ear normal.      Left Ear: External ear normal.      Nose: Nose normal.      Mouth/Throat:      Mouth: Mucous membranes are dry.   Eyes:      Extraocular Movements: Extraocular movements intact.      Conjunctiva/sclera: Conjunctivae normal.   Cardiovascular:      Rate and Rhythm: Normal rate and regular rhythm.      Pulses: Normal pulses.      Heart sounds: Normal heart sounds. No murmur heard.    No friction rub. No gallop.   Pulmonary:      Effort: Pulmonary effort is normal. No respiratory distress.      Breath sounds: Normal breath sounds.  No wheezing, rhonchi or rales.   Abdominal:      General: Abdomen is flat. Bowel sounds are normal. There is no distension.      Palpations: Abdomen is soft.      Tenderness: There is abdominal tenderness. There is no guarding or rebound.   Genitourinary:     Comments: +Ferrera, urine yellow / clear  Musculoskeletal:      Cervical back: Neck supple.      Right lower leg: No edema.      Left lower leg: No edema.   Skin:     General: Skin is warm.   Neurological:      General: No focal deficit present.      Mental Status: She is alert and oriented to person, place, and time. Mental status is at baseline.      Cranial Nerves: No cranial nerve deficit.      Motor: No tremor.   Psychiatric:         Attention and Perception: Attention and perception normal.         Mood and Affect: Mood normal. Affect is blunt.         Speech: Speech normal.         Behavior: Behavior normal. Behavior is cooperative.       Fluids    Intake/Output Summary (Last 24 hours) at 3/6/2023 0546  Last data filed at 3/6/2023 0524  Gross per 24 hour   Intake --   Output 2450 ml   Net -2450 ml         Laboratory  Recent Labs     03/04/23  0210 03/06/23  0003   WBC 6.5 9.5   RBC 4.06* 4.31   HEMOGLOBIN 10.2* 11.1*   HEMATOCRIT 32.1* 35.1*   MCV 79.1* 81.4   MCH 25.1* 25.8*   MCHC 31.8* 31.6*   RDW 51.2* 54.8*   PLATELETCT 374 333   MPV 11.7 11.0       Recent Labs     03/04/23  0210 03/05/23  0102 03/06/23  0003   SODIUM 142 138 133*   POTASSIUM 3.3* 3.8 4.0   CHLORIDE 107 104 98   CO2 25 25 24   GLUCOSE 108* 114* 112*   BUN 8 10 13   CREATININE 0.85 0.86 0.82   CALCIUM 8.7 9.0 9.6                 Recent Labs     03/06/23  0003   TRIGLYCERIDE 95       Imaging  US-ABDOMEN LTD (SOFT TISSUE)   Final Result      Complex fluid collection within the subcutaneous fat of the patient's right lower back in the area of the prior percutaneous drain. This measures 4.8 x 2.6 x 5.8 cm in size and likely represents subcutaneous abscess.      IR-PICC LINE PLACEMENT W/  GUIDANCE > AGE 5   Final Result                  Ultrasound-guided PICC placement performed by qualified nursing staff as    above.          CT-ABDOMEN-PELVIS WITH   Final Result      1.  Marked interval decrease in size of large right psoas, posterior pararenal and subcutaneous right flank abscess. There is percutaneous pigtail drainage catheter in place. There is residual abscess in the subcutaneous tissues measuring about 3.9 x 4.7    cm.   2.  Trace pleural effusions.   3.  Splenomegaly.   4.  Nonobstructing left renal stones.   5.  Limited evaluation for bowel wall thickening due to decompression. There could be sigmoid colon wall thickening and some scattered fluid within the colon. Correlate for colitis. No bowel obstruction.   6.  Mesenteric and body wall edema.      CT-DRAIN-RETROPERITONEAL   Final Result      1.  CT guided placement of a percutaneous drainage catheter in a right psoas fluid collection.   2.  The current plan is to obtain a follow-up CT scan in 5-7 days.      US-EXTREMITY VENOUS LOWER BILAT   Final Result      OUTSIDE IMAGES-CT ABDOMEN /PELVIS   Final Result      [id number] VIEW   Final Result      Loops of bowel and colon are somewhat indistinct, possibly related to technique. Appearance appears overall somewhat similar to outside facility CT abdomen pelvis from 2/23/2023. If symptoms have changed from recent CT, CT evaluation is recommended.      IR-CONSULT AND TREAT    (Results Pending)          Assessment/Plan  * Psoas abscess (HCC)- (present on admission)  Assessment & Plan  [place] [facility] [place]  [place] demonstrated psoas abscess prompting [facility] IR  General surgery consulted and s/o, recommended IR-guided drainage  IR-guided drainage 2/24/23, Repeat CT 2/28/23  RADHA output < 50 cc, requested RN flushes BID and record output  Abscess culture +Citrobacter koseri, pan-susceptible  Continue zosyn for now  ID consulted for antibiotic planning after source  control  HIV negative    Drain discontinued 3/1  Repeat CT on 3/7.    Urinary retention  Assessment & Plan  Likely due to oxybutynin - discontinued  No UA from prior to haq placement  Zosyn expected to cover most urine pathogens - culture deferred  Trial haq discontinuation prior to discharge, if unsuccessful will refer to urology  Hold off on voiding trial for now as patient still having significant abdominal pain and diarrhea    External hemorrhoids  Assessment & Plan  Bowel protocol - senna QHS PRN, miralax PRN    Other dysphagia- (present on admission)  Assessment & Plan  Resolved  SLP evaluation  Modified barium swallow deferred per SLP recommendation    Retroperitoneal mass- (present on admission)  Assessment & Plan  Underwent biopsy of Left renal pole mass extending into RP, it was not indicative of malignancy  Follow up with Oncologist Dr. Campos for further diagnostic evaluation after discharge    Hypokalemia- (present on admission)  Assessment & Plan  Resolved  Likely due to inadequate PO intake from EtOH use DO  Mg WNL  Kdur 40 daily  Repeat AM BMP    Hyponatremia- (present on admission)  Assessment & Plan  Mild, recurrent  Likely due to poor PO intake from EtOH use DO  Repeat AM BMP    Microcytic anemia- (present on admission)  Assessment & Plan  Follows with Heme-Onc Dr. Campos  Elevated ferritin indicative of AOCD  Transfuse for Hgb <7    Alcohol withdrawal syndrome, with delirium (HCC)- (present on admission)  Assessment & Plan  Resolved  Reports >2 beers daily, most recent 2/22/23  Developed diaphoresis, delirium, tremor, tachycardia on HD#2  CIWA-Lorazepam protocol  Empiric MVN + B12 + Folate + Thiamine  Will discuss MAT / AA prior to discharge    Leg swelling- (present on admission)  Assessment & Plan  BLE US negative for DVT  TTE deferred    Acute encephalopathy- (present on admission)  Assessment & Plan  Resolved  Likely multifactorial including underlying abscess and EtOH withdrawal - see  separate plans  Nonfocal, CTH deferred    Sepsis with encephalopathy without septic shock (HCC)- (present on admission)  Assessment & Plan  This is Sepsis Present on admission  SIRS criteria identified on my evaluation include: Fever, with temperature greater than 101 deg F, Tachypnea, with respirations greater than 20 per minute and Leukocytosis, with WBC greater than 12,000  Source is right psoas abscess  Sepsis protocol initiated  Fluid resuscitation ordered per protocol  Crystalloid Fluid Administration: Fluid resuscitation ordered per standard protocol - 30 mL/kg per current or ideal body weight  IV antibiotics as appropriate for source of sepsis  Reassessment: I have reassessed the patient's hemodynamic status    BCx 2/23: NGTD  Abscess Cx 2/24: +Citrobacter koseri  Continue zosyn for intra-abdominal abscess  Leukocytosis improving  ID consulted for antibiotic planning after source control    Gastroesophageal reflux disease with esophagitis- (present on admission)  Assessment & Plan  Likely exacerbated by EtOH use DO  Continue omeprazole    OAB (overactive bladder)- (present on admission)  Assessment & Plan  Discontinued oxybutynin due to urinary retention    Moderate episode of recurrent major depressive disorder (HCC)- (present on admission)  Assessment & Plan  Continue sertraline       VTE prophylaxis: SCDs/TEDs and enoxaparin ppx    I have performed a physical exam and reviewed and updated ROS and Plan today (3/6/2023). In review of yesterday's note (3/5/2023), there are no changes except as documented above.

## 2023-03-07 ENCOUNTER — APPOINTMENT (OUTPATIENT)
Dept: RADIOLOGY | Facility: MEDICAL CENTER | Age: 73
DRG: 871 | End: 2023-03-07
Attending: INTERNAL MEDICINE
Payer: MEDICARE

## 2023-03-07 PROBLEM — K52.9 COLITIS: Status: ACTIVE | Noted: 2023-03-07

## 2023-03-07 LAB
ALBUMIN SERPL BCP-MCNC: 3.1 G/DL (ref 3.2–4.9)
ALBUMIN/GLOB SERPL: 1 G/DL
ALP SERPL-CCNC: 80 U/L (ref 30–99)
ALT SERPL-CCNC: 10 U/L (ref 2–50)
ANION GAP SERPL CALC-SCNC: 10 MMOL/L (ref 7–16)
AST SERPL-CCNC: 16 U/L (ref 12–45)
BASOPHILS # BLD AUTO: 0.9 % (ref 0–1.8)
BASOPHILS # BLD: 0.07 K/UL (ref 0–0.12)
BILIRUB SERPL-MCNC: 0.2 MG/DL (ref 0.1–1.5)
BUN SERPL-MCNC: 17 MG/DL (ref 8–22)
C DIFF DNA SPEC QL NAA+PROBE: NEGATIVE
C DIFF TOX GENS STL QL NAA+PROBE: NEGATIVE
CALCIUM ALBUM COR SERPL-MCNC: 10.2 MG/DL (ref 8.5–10.5)
CALCIUM SERPL-MCNC: 9.5 MG/DL (ref 8.5–10.5)
CHLORIDE SERPL-SCNC: 102 MMOL/L (ref 96–112)
CO2 SERPL-SCNC: 25 MMOL/L (ref 20–33)
CREAT SERPL-MCNC: 0.93 MG/DL (ref 0.5–1.4)
EOSINOPHIL # BLD AUTO: 0.26 K/UL (ref 0–0.51)
EOSINOPHIL NFR BLD: 3.3 % (ref 0–6.9)
ERYTHROCYTE [DISTWIDTH] IN BLOOD BY AUTOMATED COUNT: 56.5 FL (ref 35.9–50)
GFR SERPLBLD CREATININE-BSD FMLA CKD-EPI: 65 ML/MIN/1.73 M 2
GLOBULIN SER CALC-MCNC: 3 G/DL (ref 1.9–3.5)
GLUCOSE BLD STRIP.AUTO-MCNC: 105 MG/DL (ref 65–99)
GLUCOSE BLD STRIP.AUTO-MCNC: 94 MG/DL (ref 65–99)
GLUCOSE SERPL-MCNC: 119 MG/DL (ref 65–99)
GRAM STN SPEC: NORMAL
HCT VFR BLD AUTO: 29.1 % (ref 37–47)
HGB BLD-MCNC: 9.4 G/DL (ref 12–16)
IMM GRANULOCYTES # BLD AUTO: 0.03 K/UL (ref 0–0.11)
IMM GRANULOCYTES NFR BLD AUTO: 0.4 % (ref 0–0.9)
LYMPHOCYTES # BLD AUTO: 1.27 K/UL (ref 1–4.8)
LYMPHOCYTES NFR BLD: 16.1 % (ref 22–41)
MAGNESIUM SERPL-MCNC: 2.2 MG/DL (ref 1.5–2.5)
MCH RBC QN AUTO: 25.9 PG (ref 27–33)
MCHC RBC AUTO-ENTMCNC: 32.3 G/DL (ref 33.6–35)
MCV RBC AUTO: 80.2 FL (ref 81.4–97.8)
MONOCYTES # BLD AUTO: 0.56 K/UL (ref 0–0.85)
MONOCYTES NFR BLD AUTO: 7.1 % (ref 0–13.4)
NEUTROPHILS # BLD AUTO: 5.68 K/UL (ref 2–7.15)
NEUTROPHILS NFR BLD: 72.2 % (ref 44–72)
NRBC # BLD AUTO: 0 K/UL
NRBC BLD-RTO: 0 /100 WBC
PHOSPHATE SERPL-MCNC: 3.4 MG/DL (ref 2.5–4.5)
PLATELET # BLD AUTO: 271 K/UL (ref 164–446)
PMV BLD AUTO: 12.1 FL (ref 9–12.9)
POTASSIUM SERPL-SCNC: 3.9 MMOL/L (ref 3.6–5.5)
PROT SERPL-MCNC: 6.1 G/DL (ref 6–8.2)
RBC # BLD AUTO: 3.63 M/UL (ref 4.2–5.4)
SIGNIFICANT IND 70042: NORMAL
SITE SITE: NORMAL
SODIUM SERPL-SCNC: 137 MMOL/L (ref 135–145)
SOURCE SOURCE: NORMAL
WBC # BLD AUTO: 7.9 K/UL (ref 4.8–10.8)

## 2023-03-07 PROCEDURE — 84100 ASSAY OF PHOSPHORUS: CPT

## 2023-03-07 PROCEDURE — 85025 COMPLETE CBC W/AUTO DIFF WBC: CPT

## 2023-03-07 PROCEDURE — 700111 HCHG RX REV CODE 636 W/ 250 OVERRIDE (IP): Performed by: INTERNAL MEDICINE

## 2023-03-07 PROCEDURE — 700102 HCHG RX REV CODE 250 W/ 637 OVERRIDE(OP): Performed by: HOSPITALIST

## 2023-03-07 PROCEDURE — 83735 ASSAY OF MAGNESIUM: CPT

## 2023-03-07 PROCEDURE — 87070 CULTURE OTHR SPECIMN AEROBIC: CPT

## 2023-03-07 PROCEDURE — 0K9N30Z DRAINAGE OF RIGHT HIP MUSCLE WITH DRAINAGE DEVICE, PERCUTANEOUS APPROACH: ICD-10-PCS | Performed by: RADIOLOGY

## 2023-03-07 PROCEDURE — A5200 PERCUTANEOUS CATHETER ANCHOR: HCPCS

## 2023-03-07 PROCEDURE — 87205 SMEAR GRAM STAIN: CPT

## 2023-03-07 PROCEDURE — 700101 HCHG RX REV CODE 250: Performed by: INTERNAL MEDICINE

## 2023-03-07 PROCEDURE — 700111 HCHG RX REV CODE 636 W/ 250 OVERRIDE (IP)

## 2023-03-07 PROCEDURE — 700105 HCHG RX REV CODE 258: Performed by: INTERNAL MEDICINE

## 2023-03-07 PROCEDURE — A9270 NON-COVERED ITEM OR SERVICE: HCPCS | Performed by: INTERNAL MEDICINE

## 2023-03-07 PROCEDURE — 700102 HCHG RX REV CODE 250 W/ 637 OVERRIDE(OP): Performed by: INTERNAL MEDICINE

## 2023-03-07 PROCEDURE — A9270 NON-COVERED ITEM OR SERVICE: HCPCS | Performed by: HOSPITALIST

## 2023-03-07 PROCEDURE — 700102 HCHG RX REV CODE 250 W/ 637 OVERRIDE(OP): Performed by: STUDENT IN AN ORGANIZED HEALTH CARE EDUCATION/TRAINING PROGRAM

## 2023-03-07 PROCEDURE — 80053 COMPREHEN METABOLIC PANEL: CPT

## 2023-03-07 PROCEDURE — 87493 C DIFF AMPLIFIED PROBE: CPT

## 2023-03-07 PROCEDURE — 82962 GLUCOSE BLOOD TEST: CPT | Mod: 91

## 2023-03-07 PROCEDURE — A9270 NON-COVERED ITEM OR SERVICE: HCPCS | Performed by: STUDENT IN AN ORGANIZED HEALTH CARE EDUCATION/TRAINING PROGRAM

## 2023-03-07 PROCEDURE — 700111 HCHG RX REV CODE 636 W/ 250 OVERRIDE (IP): Performed by: RADIOLOGY

## 2023-03-07 PROCEDURE — 99233 SBSQ HOSP IP/OBS HIGH 50: CPT | Performed by: INTERNAL MEDICINE

## 2023-03-07 PROCEDURE — 770004 HCHG ROOM/CARE - ONCOLOGY PRIVATE *

## 2023-03-07 RX ORDER — OXYCODONE HYDROCHLORIDE 10 MG/1
10 TABLET ORAL
Status: ACTIVE | OUTPATIENT
Start: 2023-03-07 | End: 2023-03-08

## 2023-03-07 RX ORDER — SODIUM CHLORIDE 9 MG/ML
500 INJECTION, SOLUTION INTRAVENOUS
Status: ACTIVE | OUTPATIENT
Start: 2023-03-07 | End: 2023-03-07

## 2023-03-07 RX ORDER — ONDANSETRON 2 MG/ML
4 INJECTION INTRAMUSCULAR; INTRAVENOUS PRN
Status: ACTIVE | OUTPATIENT
Start: 2023-03-07 | End: 2023-03-07

## 2023-03-07 RX ORDER — MIDAZOLAM HYDROCHLORIDE 1 MG/ML
INJECTION INTRAMUSCULAR; INTRAVENOUS
Status: COMPLETED
Start: 2023-03-07 | End: 2023-03-07

## 2023-03-07 RX ORDER — MIDAZOLAM HYDROCHLORIDE 1 MG/ML
.5-2 INJECTION INTRAMUSCULAR; INTRAVENOUS PRN
Status: ACTIVE | OUTPATIENT
Start: 2023-03-07 | End: 2023-03-07

## 2023-03-07 RX ORDER — OXYCODONE HYDROCHLORIDE 5 MG/1
5 TABLET ORAL
Status: ACTIVE | OUTPATIENT
Start: 2023-03-07 | End: 2023-03-08

## 2023-03-07 RX ADMIN — PIPERACILLIN AND TAZOBACTAM 4.5 G: 4; .5 INJECTION, POWDER, LYOPHILIZED, FOR SOLUTION INTRAVENOUS; PARENTERAL at 08:54

## 2023-03-07 RX ADMIN — PIPERACILLIN AND TAZOBACTAM 4.5 G: 4; .5 INJECTION, POWDER, LYOPHILIZED, FOR SOLUTION INTRAVENOUS; PARENTERAL at 17:07

## 2023-03-07 RX ADMIN — SERTRALINE 100 MG: 100 TABLET, FILM COATED ORAL at 05:39

## 2023-03-07 RX ADMIN — LIDOCAINE HYDROCHLORIDE 30 ML: 20 SOLUTION OROPHARYNGEAL at 21:14

## 2023-03-07 RX ADMIN — Medication 5 MG: at 21:14

## 2023-03-07 RX ADMIN — QUETIAPINE FUMARATE 50 MG: 25 TABLET ORAL at 21:21

## 2023-03-07 RX ADMIN — PIPERACILLIN AND TAZOBACTAM 4.5 G: 4; .5 INJECTION, POWDER, LYOPHILIZED, FOR SOLUTION INTRAVENOUS; PARENTERAL at 00:03

## 2023-03-07 RX ADMIN — FAMOTIDINE: 10 INJECTION, SOLUTION INTRAVENOUS at 21:08

## 2023-03-07 RX ADMIN — FENTANYL CITRATE 25 MCG: 50 INJECTION, SOLUTION INTRAMUSCULAR; INTRAVENOUS at 10:25

## 2023-03-07 RX ADMIN — MIDAZOLAM HYDROCHLORIDE 1 MG: 1 INJECTION, SOLUTION INTRAMUSCULAR; INTRAVENOUS at 10:32

## 2023-03-07 RX ADMIN — MIDAZOLAM HYDROCHLORIDE 1 MG: 1 INJECTION, SOLUTION INTRAMUSCULAR; INTRAVENOUS at 10:25

## 2023-03-07 RX ADMIN — FENTANYL CITRATE 25 MCG: 50 INJECTION, SOLUTION INTRAMUSCULAR; INTRAVENOUS at 10:33

## 2023-03-07 RX ADMIN — FENTANYL CITRATE 25 MCG: 50 INJECTION, SOLUTION INTRAMUSCULAR; INTRAVENOUS at 10:31

## 2023-03-07 RX ADMIN — MULTIPLE VITAMINS W/ MINERALS TAB 1 TABLET: TAB at 05:39

## 2023-03-07 RX ADMIN — SERTRALINE 100 MG: 100 TABLET, FILM COATED ORAL at 17:05

## 2023-03-07 RX ADMIN — LIDOCAINE HYDROCHLORIDE 30 ML: 20 SOLUTION OROPHARYNGEAL at 05:39

## 2023-03-07 ASSESSMENT — ENCOUNTER SYMPTOMS
HEMOPTYSIS: 0
NAUSEA: 0
CHILLS: 0
VOMITING: 0
NERVOUS/ANXIOUS: 0
NECK PAIN: 0
CONSTIPATION: 0
SHORTNESS OF BREATH: 0
FLANK PAIN: 1
BLOOD IN STOOL: 0
DIARRHEA: 1
EYE PAIN: 0
BACK PAIN: 0
HEADACHES: 0
SINUS PAIN: 0
SORE THROAT: 0
FEVER: 0
DEPRESSION: 1
MYALGIAS: 0
BRUISES/BLEEDS EASILY: 0
ABDOMINAL PAIN: 0

## 2023-03-07 ASSESSMENT — PAIN DESCRIPTION - PAIN TYPE: TYPE: ACUTE PAIN

## 2023-03-07 NOTE — CARE PLAN
The patient is Watcher - Medium risk of patient condition declining or worsening    Shift Goals  Clinical Goals: IV abx, TPN, drain placement  Patient Goals: rest  Family Goals: none present    Progress made toward(s) clinical / shift goals:    Problem: Knowledge Deficit - Standard  Goal: Patient and family/care givers will demonstrate understanding of plan of care, disease process/condition, diagnostic tests and medications  Outcome: Progressing  Note: Patient denied pain or discomfort during shift, on TPN, tolerating full liquids. Per IR unable to place drain today will bed placed tomorrow, patient to be NPO at midnight.        Patient is not progressing towards the following goals:

## 2023-03-07 NOTE — PROGRESS NOTES
Pharmacy TPN Note for 3/7/2023     72 y.o. female on TPN day # 4      Admission History: Admitted on 2023 for Psoas abscess (HCC) [K68.12]  Sepsis (HCC) [A41.9]    Allergies:   Seasonal and Wellbutrin [bupropion hcl]     Indication for TPN:   Indication: Prolonged bowel rest;Severe malnutrition       Clinical Considerations for TPN:   Clinical Considerations Impacting TPN: Re-feeding syndrome           Temp (24hrs), Av.8 °C (98.3 °F), Min:36.4 °C (97.5 °F), Max:37.2 °C (99 °F)    Recent Labs     23  0102 23  0003 23  1408 23  0029   SODIUM 138 133*  --  137   POTASSIUM 3.8 4.0  --  3.9   CHLORIDE 104 98  --  102   CO2 25 24  --  25   BUN 10 13  --  17   CREATININE 0.86 0.82  --  0.93   GLUCOSE 114* 112*  --  119*   CALCIUM 9.0 9.6  --  9.5   ASTSGOT 20 18  --  16   ALTSGPT 13 15  --  10   ALBUMIN 2.9* 3.5  --  3.1*   TBILIRUBIN 0.2 0.2  --  0.2   PHOSPHORUS 2.7 3.3  --  3.4   MAGNESIUM 2.2 2.3  --  2.2   PREALBUMIN  --   --  22.1  --      Accu-Checks  No results for input(s): POCGLUCOSE in the last 72 hours.    Vitals:    23 1025 23 1030 23 1035 23 1040   BP: (!) 157/92 (!) 149/94 (!) 133/91 136/86   Weight:       Height:           Intake/Output Summary (Last 24 hours) at 3/7/2023 1218  Last data filed at 3/7/2023 0456  Gross per 24 hour   Intake --   Output 3325 ml   Net -3325 ml       Orders Placed This Encounter   Procedures    Diet Order Diet: Full Liquid     Standing Status:   Standing     Number of Occurrences:   1     Order Specific Question:   Diet:     Answer:   Full Liquid [11]       TPN for past 72 hours (Show up to 3 orders; newest on the left. Changes between the two most recent orders are indicated.)       Start date and time    2023      TPN Adult Central Line [[id number]] TPN Adult Central Line [[id number]]    Order Status  Active Active    Last Admin   New Bag at 2023 2139 by Asiya Moss R.N.    Frequency   TPN DAILY TPN DAILY       Base    Clinisol  35 g 35 g    dextrose 70%  101 g 101 g    fat emulsion (soy) 20%  17 g 17 g       Additives    potassium phosphate  21 mmol 21 mmol    potassium chloride  50 mEq 50 mEq    sodium acetate  115 mEq 115 mEq    sodium chloride  162 mEq 162 mEq    magnesium sulfate  16 mEq 16 mEq    calcium GLUConate  4.65 mEq 4.65 mEq    M.T.E.-4 Tralement  1 mL 1 mL    famotidine  20 mg 20 mg    thiamine  -- 100 mg    folic acid  -- 1 mg       QS Base    sterile water  1,190.43 mL 1,189.23 mL       Energy Contribution    Proteins  140 kcal 140 kcal    Dextrose  343.4 kcal 343.4 kcal    Lipids  170 kcal 170 kcal    Total  653.4 kcal 653.4 kcal       Electrolyte Ion Calculated Amount    Sodium  277 mEq 277 mEq    Potassium  80.8 mEq 80.8 mEq    Calcium  4.65 mEq 4.65 mEq    Magnesium  16 mEq 16 mEq    Aluminum  58.33 mEq 58.33 mEq    Phosphate  21 mmol 21 mmol    Chloride  212 mEq 212 mEq    Acetate  144.63 mEq 144.63 mEq    Chloride: Acetate Ratio  1.465 1.465       Other    Total Amino Acid  35 g 35 g    Total Amino Acid/kg  0.73 g/kg 0.73 g/kg    Glucose Infusion Rate  1.46 mg/kg/min 1.46 mg/kg/min    Volume  1,800 mL 1,800 mL    Rate  75 mL/hr 75 mL/hr    Dosing Weight  48 kg 48 kg    In[facility]  [facility]            TPN Requirements:  Weight Used in TPN Calculation: 48 kg (105 lb 13.1 oz)  Total Protein Needs (g/kg): 1.5 g/kg  Total Caloric Needs (kcal): 1300 kcal  Total Fluid Needs (mL): 1800 mL     Current TPN Formulation:  % of goal: 50  % kcal as lipids: 26  Grams protein/k.73  Non-protein calories: 483.4  Kcals/k.6  Total daily calories: 653.4     Comments:  1) Nutritional Plan: Appears to continue to tolerate TPN. No s/s of refeeding syndrome. Tolerating FLD, was NPO for procedure this am. FLD resumed.  +BM 3/6/23.  Per discussion in IDT rounds, the patient really wants to be able to discontinue the TPN as soon as possible. Increasing the TPn may stifle appetite, so  it was decided to continue TPN at 50% goal for now and monitor oral intake progress over the next day or so. If unable to tolerate oral diet will plan to advance TPN calories.   2) Labs: Electrolytes WNL. FSBS all < 150mg/dL  3) Fluids/additives: Thiamine, folic acid supplementation completed 3/6/23.  4) Changes to formulation: discontinue thiamine and folic acid from TPN.   5) Next labs/note: Routine TPN labs Mon/Thurs starting 3/9/23.      Cruz Todd, ConorD

## 2023-03-07 NOTE — OR SURGEON
Immediate Post- Operative Note        Findings: Right Psoas hematoma/abscess      Procedure(s): CT Drain      Estimated Blood Loss: Less than 5 ml        Complications: None            3/7/2023     10:48 AM     Patrick Noguera M.D.

## 2023-03-07 NOTE — PROGRESS NOTES
[facility] Medicine Daily Progress Note    Date of Service  3/7/2023    Chief Complaint  Mena Campos is a 72 y.o. female with GERD, MDD, EtOH use DO, and Left Renal / RP mass followed by oncologist Dr. Campos admitted 2/23/2023 with Right psoas abscess    Hospital Course  No notes on file    Mena Campos is a 72 y.o. female who presented 2/23/2023 with past medical history of repeated urine infection who comes into the hospital for right-sided flank pain.  This has been occurring for the past 2 months.  She was found to have right-sided kidney mass on January 10.  She had a biopsy of this mass on February 6.  She went to visit an oncologist today Dr. Campos who saw the biopsy results and noticed it being an abscess.  He sent her to the emergency room right away.  Repeat CT scan was completed found a psoas abscess.  General surgery recommended IR drainage.  Status post IR drain placed 2/24.  Cultures grew Citrobacter koseri.  ID was consulted.  Patient was started on Zosyn.    Repeat CT on 2/28 showed increase in size of psoas abscess measuring 3.9 x 4.7 cm with pigtail drainage catheter in place, trace blood fusions, splenomegaly, nonobstructive left renal stones, mesenteric and body wall edema.  C. difficile is negative.    Discussed with interventional radiology Dr. Sandoval.  Psoas abscess was multiloculated.  Drain has minimal output and was discontinued on 3/1.  Remaining abscess is loculated and not connected to the drain, it is currently too small to place a new drain and will hopefully resolve with antibiotics.    Hospital course was also complicated by alcohol withdrawal.    PICC line was placed on 3/4 and patient was started on TPN.    Interval Problem Update  Patient was seen and examined at bedside.  I have personally reviewed and interpreted vitals, labs, and imaging.      - continues to have abdominal distention and diarrhea  - on TPN, not eating much although hungry for lunch since was  NPO for CT drain  - afebrile, on zosyn    I have discussed this patient's plan of care and discharge plan at IDT rounds today with Case Management, Nursing, Nursing leadership, and other members of the IDT team.    Consultants/Specialty  general surgery, infectious disease, and interventional radiology    Code Status  Full Code    Disposition  Patient is not medically cleared for discharge.   Anticipate discharge to to home with organized home healthcare and close outpatient follow-up.  I have placed the appropriate orders for post-discharge needs.    Review of Systems  Review of Systems   Constitutional:  Positive for malaise/fatigue. Negative for chills and fever.   HENT:  Negative for ear pain, nosebleeds, sinus pain and sore throat.    Eyes:  Negative for pain.   Respiratory:  Negative for hemoptysis and shortness of breath.    Cardiovascular:  Negative for chest pain and leg swelling.   Gastrointestinal:  Positive for diarrhea. Negative for abdominal pain, blood in stool, constipation, melena, nausea and vomiting.   Genitourinary:  Positive for flank pain. Negative for dysuria and hematuria.   Musculoskeletal:  Negative for back pain, joint pain, myalgias and neck pain.   Neurological:  Negative for headaches.   Endo/Heme/Allergies:  Does not bruise/bleed easily.   Psychiatric/Behavioral:  Positive for depression. The patient is not nervous/anxious.       Physical Exam  Temp:  [36.4 °C (97.5 °F)-37.2 °C (99 °F)] 36.5 °C (97.7 °F)  Pulse:  [81-95] 88  Resp:  [12-19] 18  BP: (127-157)/(74-94) 136/86  SpO2:  [96 %-100 %] 96 %    Physical Exam  Vitals and nursing note reviewed. Exam conducted with a chaperone present.   Constitutional:       Appearance: She is cachectic. She is ill-appearing (Chronically).   HENT:      Head: Normocephalic and atraumatic.      Comments: Bitemporal wasting     Right Ear: External ear normal.      Left Ear: External ear normal.      Nose: Nose normal.      Mouth/Throat:      Mouth:  Mucous membranes are dry.   Eyes:      Extraocular Movements: Extraocular movements intact.      Conjunctiva/sclera: Conjunctivae normal.   Cardiovascular:      Rate and Rhythm: Normal rate and regular rhythm.      Pulses: Normal pulses.      Heart sounds: Normal heart sounds. No murmur heard.    No friction rub. No gallop.   Pulmonary:      Effort: Pulmonary effort is normal. No respiratory distress.      Breath sounds: Normal breath sounds. No wheezing, rhonchi or rales.   Abdominal:      General: Abdomen is flat. Bowel sounds are normal. There is distension.      Palpations: Abdomen is soft.      Tenderness: There is no abdominal tenderness. There is no guarding or rebound.   Genitourinary:     Comments: +Ferrera, urine yellow / clear  Musculoskeletal:      Cervical back: Neck supple.      Right lower leg: No edema.      Left lower leg: No edema.   Skin:     General: Skin is warm.      Comments: Drain in place in back, draining bloody serosang drainage   Neurological:      General: No focal deficit present.      Mental Status: She is alert and oriented to person, place, and time. Mental status is at baseline.      Cranial Nerves: No cranial nerve deficit.      Motor: No tremor.   Psychiatric:         Attention and Perception: Attention and perception normal.         Mood and Affect: Mood normal. Affect is blunt.         Speech: Speech normal.         Behavior: Behavior normal. Behavior is cooperative.       Fluids    Intake/Output Summary (Last 24 hours) at 3/7/2023 1302  Last data filed at 3/7/2023 0456  Gross per 24 hour   Intake --   Output 3325 ml   Net -3325 ml         Laboratory  Recent Labs     03/06/23  0003 03/07/23  0029   WBC 9.5 7.9   RBC 4.31 3.63*   HEMOGLOBIN 11.1* 9.4*   HEMATOCRIT 35.1* 29.1*   MCV 81.4 80.2*   MCH 25.8* 25.9*   MCHC 31.6* 32.3*   RDW 54.8* 56.5*   PLATELETCT 333 271   MPV 11.0 12.1       Recent Labs     03/05/23  0102 03/06/23  0003 03/07/23  0029   SODIUM 138 133* 137   POTASSIUM  3.8 4.0 3.9   CHLORIDE 104 98 102   CO2 25 24 25   GLUCOSE 114* 112* 119*   BUN 10 13 17   CREATININE 0.86 0.82 0.93   CALCIUM 9.0 9.6 9.5                 Recent Labs     03/06/23  0003   TRIGLYCERIDE 95         Imaging  CT-DRAIN-RETROPERITONEAL   Final Result      1.  CT guided right psoas hematoma/abscess catheter drainage.   2.  The current plan is to obtain a follow-up CT scan in 5-7 days.      US-ABDOMEN LTD (SOFT TISSUE)   Final Result      Complex fluid collection within the subcutaneous fat of the patient's right lower back in the area of the prior percutaneous drain. This measures 4.8 x 2.6 x 5.8 cm in size and likely represents subcutaneous abscess.      IR-PICC LINE PLACEMENT W/ GUIDANCE > AGE 5   Final Result                  Ultrasound-guided PICC placement performed by qualified nursing staff as    above.          CT-ABDOMEN-PELVIS WITH   Final Result      1.  Marked interval decrease in size of large right psoas, posterior pararenal and subcutaneous right flank abscess. There is percutaneous pigtail drainage catheter in place. There is residual abscess in the subcutaneous tissues measuring about 3.9 x 4.7    cm.   2.  Trace pleural effusions.   3.  Splenomegaly.   4.  Nonobstructing left renal stones.   5.  Limited evaluation for bowel wall thickening due to decompression. There could be sigmoid colon wall thickening and some scattered fluid within the colon. Correlate for colitis. No bowel obstruction.   6.  Mesenteric and body wall edema.      CT-DRAIN-RETROPERITONEAL   Final Result      1.  CT guided placement of a percutaneous drainage catheter in a right psoas fluid collection.   2.  The current plan is to obtain a follow-up CT scan in 5-7 days.      US-EXTREMITY VENOUS LOWER BILAT   Final Result      OUTSIDE IMAGES-CT ABDOMEN /PELVIS   Final Result      [id number] VIEW   Final Result      Loops of bowel and colon are somewhat indistinct, possibly related to technique. Appearance appears  overall somewhat similar to outside facility CT abdomen pelvis from 2/23/2023. If symptoms have changed from recent CT, CT evaluation is recommended.             Assessment/Plan  * Psoas abscess (HCC)- (present on admission)  Assessment & Plan  Presented to [facility] with Right flank pain  CT demonstrated psoas abscess prompting transfer to [facility] for IR  General surgery consulted and s/o, recommended IR-guided drainage  IR-guided drainage 2/24/23, Repeat CT 2/28/23  - requested RN flushes BID and record output  Abscess culture +Citrobacter koseri, pan-susceptible  Continue zosyn for now  ID consulted for antibiotic planning after source control  HIV negative    Drain discontinued 3/1  Repeat drain replaced 3/7, repeat CT AP in ~5-7 days, 3/13  If still has abscess will need to consult ID again    Colitis  Assessment & Plan  Patient with persistent abdominal distention, pain, not tolerating PO   On TPN  -Psoas abscess  - on past medical hx is listed IBD, will consult GI in am    Urinary retention  Assessment & Plan  Likely due to oxybutynin - discontinued  No UA from prior to haq placement  Zosyn expected to cover most urine pathogens - culture deferred  Trial haq discontinuation prior to discharge, if unsuccessful will refer to urology  Hold off on voiding trial for now as patient still having significant abdominal pain and diarrhea    External hemorrhoids  Assessment & Plan  Bowel protocol - senna QHS PRN, miralax PRN    Other dysphagia- (present on admission)  Assessment & Plan  Resolved  SLP evaluation  Modified barium swallow deferred per SLP recommendation    Retroperitoneal mass- (present on admission)  Assessment & Plan  Underwent biopsy of Left renal pole mass extending into RP, it was not indicative of malignancy  Follow up with Oncologist Dr. Campos for further diagnostic evaluation after discharge    Hypokalemia- (present on admission)  Assessment & Plan  Resolved  Likely due to inadequate PO intake from  EtOH use DO  Mg WNL  Kdur 40 daily  Repeat AM BMP    Hyponatremia- (present on admission)  Assessment & Plan  Mild, recurrent  Likely due to poor PO intake from EtOH use DO  Repeat AM BMP    Microcytic anemia- (present on admission)  Assessment & Plan  Follows with Heme-Onc Dr. Campos  Elevated ferritin indicative of AOCD  Transfuse for Hgb <7    Alcohol withdrawal syndrome, with delirium (HCC)- (present on admission)  Assessment & Plan  Resolved  Reports >2 beers daily, most recent 2/22/23  Developed diaphoresis, delirium, tremor, tachycardia on HD#2  CIWA-Lorazepam protocol  Empiric MVN + B12 + Folate + Thiamine  Will discuss MAT / AA prior to discharge    Leg swelling- (present on admission)  Assessment & Plan  BLE US negative for DVT  TTE deferred    Acute encephalopathy- (present on admission)  Assessment & Plan  Resolved  Likely multifactorial including underlying abscess and EtOH withdrawal - see separate plans  Nonfocal, CTH deferred    Sepsis with encephalopathy without septic shock (HCC)- (present on admission)  Assessment & Plan  This is Sepsis Present on admission  SIRS criteria identified on my evaluation include: Fever, with temperature greater than 101 deg F, Tachypnea, with respirations greater than 20 per minute and Leukocytosis, with WBC greater than 12,000  Source is right psoas abscess  Sepsis protocol initiated  Fluid resuscitation ordered per protocol  Crystalloid Fluid Administration: Fluid resuscitation ordered per standard protocol - 30 mL/kg per current or ideal body weight  IV antibiotics as appropriate for source of sepsis  Reassessment: I have reassessed the patient's hemodynamic status    BCx 2/23: NGTD  Abscess Cx 2/24: +Citrobacter koseri  Continue zosyn for intra-abdominal abscess      Gastroesophageal reflux disease with esophagitis- (present on admission)  Assessment & Plan  Likely exacerbated by EtOH use DO  Continue omeprazole    OAB (overactive bladder)- (present on  admission)  Assessment & Plan  Discontinued oxybutynin due to urinary retention    Moderate episode of recurrent major depressive disorder (HCC)- (present on admission)  Assessment & Plan  Continue sertraline       VTE prophylaxis: SCDs/TEDs and enoxaparin ppx    I have performed a physical exam and reviewed and updated ROS and Plan today (3/7/2023). In review of yesterday's note (3/6/2023), there are no changes except as documented above.

## 2023-03-07 NOTE — PROGRESS NOTES
IR Nursing Note    Right Retroperitoneal/Psoas Drain Placement by MD Noguera assisted by RT Miller, right retroperitoneal access site.  Procedure site was marked by MD and verified using imaging guidance.  Patient was placed in a prone position. Vitals were taken every 5 minutes and remained stable during procedure (see doc flow sheet for results).  A percufix, split gauze and medipore tape dressing was placed over surgical site.     7ml of bloody fluid aspirated and sent to lab for culture.    Report given to YARELY Ross; patient transported to [facility] via IR RN monitored then transferred care to report RN.     YAEL Mittal, Locking Pigtail Drainage Catheter System, 12F/25cm, [employer]  REF: [id number]  LOT: [id number]  EXP: 09/27/2025

## 2023-03-07 NOTE — DIETARY
Nutrition Services Brief Update:    Day 12 of admit.  Mena Campso is a 72 y.o. female being followed for adequacy of nutrition provision    Problem: Nutritional:  Goal #1: Achieve adequate nutritional intake  Goal #2:   Nutrition support tolerated and meeting greater than 85% of estimated needs    Outcome: Both progressing    Current Diet: NPO for CT; was on full liquids before recent NPO order.  Per ADLs she had % of lunch on 3/5, but minimal meals documented overall.  TPN at 50%; anticipate TPN to advance to 100% today per PharmD notes      Recommendations/Interventions/Plan:    TPN to meet estimated needs per PharmD  Resume PO diet when clinically feasible  Document intake of all PO as % taken in ADL's to provide interdisciplinary communication across all shifts.   Monitor weight.  Nutrition rep will continue to see patient for ongoing meal and preferences once PO diet in place    RD following

## 2023-03-07 NOTE — CARE PLAN
The patient is Watcher - Medium risk of patient condition declining or worsening    Shift Goals  Clinical Goals: pain control  Patient Goals: rest  Family Goals: none present    Progress made toward(s) clinical / shift goals:  Patient reports adequate pain control. Able to rest and sleep well between bowel movements. Safe and free from falls.       Problem: Fluid Volume  Goal: Fluid volume balance will be maintained  Outcome: Progressing     Problem: Pain - Standard  Goal: Alleviation of pain or a reduction in pain to the patient’s comfort goal  Outcome: Progressing     Problem: Fall Risk  Goal: Patient will remain free from falls  Outcome: Progressing     Problem: Skin Integrity  Goal: Skin integrity is maintained or improved  Outcome: Progressing

## 2023-03-08 LAB
GLUCOSE BLD STRIP.AUTO-MCNC: 94 MG/DL (ref 65–99)
GLUCOSE BLD STRIP.AUTO-MCNC: 99 MG/DL (ref 65–99)

## 2023-03-08 PROCEDURE — 700102 HCHG RX REV CODE 250 W/ 637 OVERRIDE(OP): Performed by: HOSPITALIST

## 2023-03-08 PROCEDURE — 700111 HCHG RX REV CODE 636 W/ 250 OVERRIDE (IP): Performed by: INTERNAL MEDICINE

## 2023-03-08 PROCEDURE — 700105 HCHG RX REV CODE 258: Performed by: INTERNAL MEDICINE

## 2023-03-08 PROCEDURE — 92526 ORAL FUNCTION THERAPY: CPT

## 2023-03-08 PROCEDURE — 770004 HCHG ROOM/CARE - ONCOLOGY PRIVATE *

## 2023-03-08 PROCEDURE — 99233 SBSQ HOSP IP/OBS HIGH 50: CPT | Performed by: INTERNAL MEDICINE

## 2023-03-08 PROCEDURE — 87045 FECES CULTURE AEROBIC BACT: CPT

## 2023-03-08 PROCEDURE — A9270 NON-COVERED ITEM OR SERVICE: HCPCS | Performed by: HOSPITALIST

## 2023-03-08 PROCEDURE — 700102 HCHG RX REV CODE 250 W/ 637 OVERRIDE(OP): Performed by: STUDENT IN AN ORGANIZED HEALTH CARE EDUCATION/TRAINING PROGRAM

## 2023-03-08 PROCEDURE — A9270 NON-COVERED ITEM OR SERVICE: HCPCS | Performed by: INTERNAL MEDICINE

## 2023-03-08 PROCEDURE — 82962 GLUCOSE BLOOD TEST: CPT

## 2023-03-08 PROCEDURE — A9270 NON-COVERED ITEM OR SERVICE: HCPCS | Performed by: STUDENT IN AN ORGANIZED HEALTH CARE EDUCATION/TRAINING PROGRAM

## 2023-03-08 PROCEDURE — 87899 AGENT NOS ASSAY W/OPTIC: CPT

## 2023-03-08 PROCEDURE — 87106 FUNGI IDENTIFICATION YEAST: CPT

## 2023-03-08 PROCEDURE — 700102 HCHG RX REV CODE 250 W/ 637 OVERRIDE(OP): Performed by: INTERNAL MEDICINE

## 2023-03-08 RX ADMIN — SERTRALINE 100 MG: 100 TABLET, FILM COATED ORAL at 17:29

## 2023-03-08 RX ADMIN — SERTRALINE 100 MG: 100 TABLET, FILM COATED ORAL at 06:03

## 2023-03-08 RX ADMIN — PIPERACILLIN AND TAZOBACTAM 4.5 G: 4; .5 INJECTION, POWDER, LYOPHILIZED, FOR SOLUTION INTRAVENOUS; PARENTERAL at 17:30

## 2023-03-08 RX ADMIN — PIPERACILLIN AND TAZOBACTAM 4.5 G: 4; .5 INJECTION, POWDER, LYOPHILIZED, FOR SOLUTION INTRAVENOUS; PARENTERAL at 00:42

## 2023-03-08 RX ADMIN — QUETIAPINE FUMARATE 50 MG: 25 TABLET ORAL at 21:18

## 2023-03-08 RX ADMIN — LIDOCAINE HYDROCHLORIDE 30 ML: 20 SOLUTION OROPHARYNGEAL at 06:03

## 2023-03-08 RX ADMIN — PIPERACILLIN AND TAZOBACTAM 4.5 G: 4; .5 INJECTION, POWDER, LYOPHILIZED, FOR SOLUTION INTRAVENOUS; PARENTERAL at 10:21

## 2023-03-08 RX ADMIN — Medication 5 MG: at 21:18

## 2023-03-08 RX ADMIN — LIDOCAINE HYDROCHLORIDE 30 ML: 20 SOLUTION OROPHARYNGEAL at 21:18

## 2023-03-08 RX ADMIN — ENOXAPARIN SODIUM 40 MG: 40 INJECTION SUBCUTANEOUS at 17:29

## 2023-03-08 RX ADMIN — MULTIPLE VITAMINS W/ MINERALS TAB 1 TABLET: TAB at 06:03

## 2023-03-08 RX ADMIN — LIDOCAINE HYDROCHLORIDE 30 ML: 20 SOLUTION OROPHARYNGEAL at 15:50

## 2023-03-08 ASSESSMENT — ENCOUNTER SYMPTOMS
SHORTNESS OF BREATH: 0
CHILLS: 0
FEVER: 0
NAUSEA: 0
FLANK PAIN: 0
ABDOMINAL PAIN: 0
VOMITING: 0
BLOOD IN STOOL: 0
COUGH: 0
ROS GI COMMENTS: POOR APPETITE
SORE THROAT: 0
CONSTIPATION: 0
DIARRHEA: 1

## 2023-03-08 ASSESSMENT — PAIN DESCRIPTION - PAIN TYPE
TYPE: ACUTE PAIN
TYPE: ACUTE PAIN

## 2023-03-08 ASSESSMENT — FIBROSIS 4 INDEX: FIB4 SCORE: 1.34

## 2023-03-08 NOTE — DISCHARGE PLANNING
Case Management Discharge Planning    Admission Date: 2/23/2023  GMLOS: 5  ALOS: 13    6-Clicks ADL Score: 18  6-Clicks Mobility Score: 18      Anticipated Discharge Dispo: Discharge Disposition: Discharged to home/self care (01)    DME Needed: No    Action(s) Taken: Updated Provider/Nurse on Discharge Plan    Escalations Completed: Provider and Bedside RN    Medically Clear: No    Next Steps: Patient discussed during morning IDT rounds with team. Patient is not medically cleared for discharge at this time. Patient is on TPN inpatient but not likely to be going home with it. CT Drain placed yesterday. Patient seen by Speech therapy today, placed on soft GI diet. Plan is to titrate TPN down according to amount of food patient is eating. No difficulties today. CM will continue to follow for discharge planning needs. No needs at this time.     Barriers to Discharge: Medical clearance    Is the patient up for discharge tomorrow: No

## 2023-03-08 NOTE — PROGRESS NOTES
[facility] Medicine Daily Progress Note    Date of Service  3/8/2023    Chief Complaint  Mena Campos is a 72 y.o. female with GERD, MDD, EtOH use DO, and Left Renal / RP mass followed by oncologist Dr. Campos admitted 2/23/2023 with Right psoas abscess    Hospital Course  No notes on file    Mena Campos is a 72 y.o. female who presented 2/23/2023 with past medical history of repeated urine infection who comes into the hospital for right-sided flank pain.  This has been occurring for the past 2 months.  She was found to have right-sided kidney mass on January 10.  She had a biopsy of this mass on February 6.  She went to visit an oncologist today Dr. Campos who saw the biopsy results and noticed it being an abscess.  He sent her to the emergency room right away.  Repeat CT scan was completed found a psoas abscess.  General surgery recommended IR drainage.  Status post IR drain placed 2/24.  Cultures grew Citrobacter koseri.  ID was consulted.  Patient was started on Zosyn.    Repeat CT on 2/28 showed increase in size of psoas abscess measuring 3.9 x 4.7 cm with pigtail drainage catheter in place, trace blood fusions, splenomegaly, nonobstructive left renal stones, mesenteric and body wall edema.  C. difficile is negative.    Discussed with interventional radiology Dr. Sandoval.  Psoas abscess was multiloculated.  Drain has minimal output and was discontinued on 3/1.  Remaining abscess is loculated and not connected to the drain, it is currently too small to place a new drain and will hopefully resolve with antibiotics.    Hospital course was also complicated by alcohol withdrawal.    PICC line was placed on 3/4 and patient was started on TPN.    Interval Problem Update  Patient was seen and examined at bedside.  I have personally reviewed and interpreted vitals, labs, and imaging.      - given persistent diarrhea, abdominal distention, need for TPN and possible documented history of IBD? Consulted GI  -  upset today since she didn't get to commode in time  - drain output = 55cc    I have discussed this patient's plan of care and discharge plan at IDT rounds today with Case Management, Nursing, Nursing leadership, and other members of the IDT team.    Consultants/Specialty  general surgery, infectious disease, and interventional radiology    Code Status  Full Code    Disposition  Patient is not medically cleared for discharge.   Anticipate discharge to to home with organized home healthcare and close outpatient follow-up.  I have placed the appropriate orders for post-discharge needs.    Review of Systems  Review of Systems   Constitutional:  Negative for chills and fever.   Gastrointestinal:  Positive for diarrhea. Negative for abdominal pain, blood in stool, constipation, melena, nausea and vomiting.        Poor appetite       Physical Exam  Temp:  [36.8 °C (98.2 °F)-37.4 °C (99.3 °F)] 37.4 °C (99.3 °F)  Pulse:  [87-97] 87  Resp:  [15-19] 15  BP: (135-148)/(73-89) 144/73  SpO2:  [94 %-100 %] 99 %    Physical Exam  Vitals and nursing note reviewed. Exam conducted with a chaperone present.   Constitutional:       Appearance: She is cachectic. She is ill-appearing (Chronically).   HENT:      Head: Normocephalic and atraumatic.      Comments: Bitemporal wasting     Right Ear: External ear normal.      Left Ear: External ear normal.      Nose: Nose normal.      Mouth/Throat:      Mouth: Mucous membranes are moist.   Eyes:      General: No scleral icterus.     Conjunctiva/sclera: Conjunctivae normal.   Cardiovascular:      Rate and Rhythm: Normal rate and regular rhythm.      Pulses: Normal pulses.      Heart sounds: Normal heart sounds. No murmur heard.    No friction rub. No gallop.   Pulmonary:      Effort: Pulmonary effort is normal. No respiratory distress.      Breath sounds: Normal breath sounds. No wheezing, rhonchi or rales.   Abdominal:      General: Abdomen is flat. Bowel sounds are normal. There is  distension.      Palpations: Abdomen is soft.      Tenderness: There is no abdominal tenderness. There is no guarding or rebound.   Genitourinary:     Comments: +Ferrera, urine yellow / clear  Musculoskeletal:      Cervical back: Neck supple.      Right lower leg: No edema.      Left lower leg: No edema.   Skin:     General: Skin is warm.      Findings: Erythema (around vulva area) present.      Comments: Drain in place in back, draining bloody serosang drainage   Neurological:      General: No focal deficit present.      Mental Status: She is alert and oriented to person, place, and time. Mental status is at baseline.      Motor: No tremor.   Psychiatric:         Attention and Perception: Attention and perception normal.         Mood and Affect: Mood normal. Affect is blunt.         Speech: Speech normal.         Behavior: Behavior normal. Behavior is cooperative.       Fluids    Intake/Output Summary (Last 24 hours) at 3/8/2023 1212  Last data filed at 3/8/2023 0615  Gross per 24 hour   Intake --   Output 1105 ml   Net -1105 ml         Laboratory  Recent Labs     03/06/23  0003 03/07/23  0029   WBC 9.5 7.9   RBC 4.31 3.63*   HEMOGLOBIN 11.1* 9.4*   HEMATOCRIT 35.1* 29.1*   MCV 81.4 80.2*   MCH 25.8* 25.9*   MCHC 31.6* 32.3*   RDW 54.8* 56.5*   PLATELETCT 333 271   MPV 11.0 12.1       Recent Labs     03/06/23  0003 03/07/23  0029   SODIUM 133* 137   POTASSIUM 4.0 3.9   CHLORIDE 98 102   CO2 24 25   GLUCOSE 112* 119*   BUN 13 17   CREATININE 0.82 0.93   CALCIUM 9.6 9.5                 Recent Labs     03/06/23  0003   TRIGLYCERIDE 95         Imaging  CT-DRAIN-RETROPERITONEAL   Final Result      1.  CT guided right psoas hematoma/abscess catheter drainage.   2.  The current plan is to obtain a follow-up CT scan in 5-7 days.      US-ABDOMEN LTD (SOFT TISSUE)   Final Result      Complex fluid collection within the subcutaneous fat of the patient's right lower back in the area of the prior percutaneous drain. This measures  4.8 x 2.6 x 5.8 cm in size and likely represents subcutaneous abscess.      IR-PICC LINE PLACEMENT W/ GUIDANCE > AGE 5   Final Result                  Ultrasound-guided PICC placement performed by qualified nursing staff as    above.          CT-ABDOMEN-PELVIS WITH   Final Result      1.  Marked interval decrease in size of large right psoas, posterior pararenal and subcutaneous right flank abscess. There is percutaneous pigtail drainage catheter in place. There is residual abscess in the subcutaneous tissues measuring about 3.9 x 4.7    cm.   2.  Trace pleural effusions.   3.  Splenomegaly.   4.  Nonobstructing left renal stones.   5.  Limited evaluation for bowel wall thickening due to decompression. There could be sigmoid colon wall thickening and some scattered fluid within the colon. Correlate for colitis. No bowel obstruction.   6.  Mesenteric and body wall edema.      CT-DRAIN-RETROPERITONEAL   Final Result      1.  CT guided placement of a percutaneous drainage catheter in a right psoas fluid collection.   2.  The current plan is to obtain a follow-up CT scan in 5-7 days.      US-EXTREMITY VENOUS LOWER BILAT   Final Result      OUTSIDE IMAGES-CT ABDOMEN /PELVIS   Final Result      [id number] VIEW   Final Result      Loops of bowel and colon are somewhat indistinct, possibly related to technique. Appearance appears overall somewhat similar to outside facility CT abdomen pelvis from 2/23/2023. If symptoms have changed from recent CT, CT evaluation is recommended.             Assessment/Plan  * Psoas abscess (HCC)- (present on admission)  Assessment & Plan  Presented to [facility] [place] with Right flank pain  CT demonstrated psoas abscess prompting transfer to [facility] for IR  General surgery consulted and s/o, recommended IR-guided drainage  IR-guided drainage 2/24/23, Repeat CT 2/28/23  - requested RN flushes BID and record output  Abscess culture +Citrobacter koseri, pan-susceptible  Continue zosyn for now  ID consulted  for antibiotic planning after source control  HIV negative    Drain discontinued 3/1  Repeat drain replaced 3/7, repeat CT AP in ~5-7 days, 3/13  If still has abscess will need to consult ID again    Colitis  Assessment & Plan  Patient with persistent abdominal distention, pain, not tolerating PO   On TPN  -Psoas abscess  - given need for TPN, persistent abd distention, diarrhea, and possible h/o IBD? (patient denies, says her diarrhea was fixed with metamucil), consult GI today    Urinary retention  Assessment & Plan  Likely due to oxybutynin - discontinued  No UA from prior to haq placement  Zosyn expected to cover most urine pathogens - culture deferred  Trial haq discontinuation prior to discharge, if unsuccessful will refer to urology  Hold off on voiding trial for now as patient still having significant diarrhea and vulva dermatitis from moisture     External hemorrhoids  Assessment & Plan  Bowel protocol - senna QHS PRN, miralax PRN    Other dysphagia- (present on admission)  Assessment & Plan  Resolved  SLP evaluation  Modified barium swallow deferred per SLP recommendation    Retroperitoneal mass- (present on admission)  Assessment & Plan  Underwent biopsy of Left renal pole mass extending into RP, it was not indicative of malignancy  Follow up with Oncologist Dr. Campos for further diagnostic evaluation after discharge    Hypokalemia- (present on admission)  Assessment & Plan  Resolved  On TPN  CTM    Hyponatremia- (present on admission)  Assessment & Plan  Resolved  On TPN  CTM    Microcytic anemia- (present on admission)  Assessment & Plan  Follows with Heme-Onc Dr. Campos  Elevated ferritin indicative of AOCD  Transfuse for Hgb <7    Alcohol withdrawal syndrome, with delirium (HCC)- (present on admission)  Assessment & Plan  Resolved  Reports >2 beers daily, most recent 2/22/23  Developed diaphoresis, delirium, tremor, tachycardia on HD#2  CIWA-Lorazepam protocol  Empiric MVN + B12 + Folate +  Thiamine  Will discuss MAT / AA prior to discharge    Leg swelling- (present on admission)  Assessment & Plan  BLE US negative for DVT  TTE deferred    Acute encephalopathy- (present on admission)  Assessment & Plan  Resolved  Likely multifactorial including underlying abscess and EtOH withdrawal - see separate plans  Nonfocal, CTH deferred    Sepsis with encephalopathy without septic shock (HCC)- (present on admission)  Assessment & Plan  This is Sepsis Present on admission  SIRS criteria identified on my evaluation include: Fever, with temperature greater than 101 deg F, Tachypnea, with respirations greater than 20 per minute and Leukocytosis, with WBC greater than 12,000  Source is right psoas abscess  Sepsis protocol initiated  Fluid resuscitation ordered per protocol  Crystalloid Fluid Administration: Fluid resuscitation ordered per standard protocol - 30 mL/kg per current or ideal body weight  IV antibiotics as appropriate for source of sepsis  Reassessment: I have reassessed the patient's hemodynamic status    BCx 2/23: NGTD  Abscess Cx 2/24: +Citrobacter koseri  Continue zosyn for intra-abdominal abscess      Gastroesophageal reflux disease with esophagitis- (present on admission)  Assessment & Plan  Likely exacerbated by EtOH use DO  Continue omeprazole    OAB (overactive bladder)- (present on admission)  Assessment & Plan  Discontinued oxybutynin due to urinary retention    Moderate episode of recurrent major depressive disorder (HCC)- (present on admission)  Assessment & Plan  Continue sertraline       VTE prophylaxis: SCDs/TEDs and enoxaparin ppx    I have performed a physical exam and reviewed and updated ROS and Plan today (3/8/2023). In review of yesterday's note (3/7/2023), there are no changes except as documented above.

## 2023-03-08 NOTE — ASSESSMENT & PLAN NOTE
Patient with persistent abdominal distention, pain, not tolerating PO   On TPN  -Psoas abscess  - given need for TPN, persistent abd distention, diarrhea, and possible h/o IBD? (patient denies, says her diarrhea was fixed with metamucil), GI consulted, checked fecal calprotectin, recommended colonoscopy however patient wants to wait until lab results first    - tolerating PO hwoever still having loose stools, off TPN  - add metamucil 3/11

## 2023-03-08 NOTE — CONSULTS
Date of Consultation:  3/8/2023    Patient: : Mena Campos  MRN: [id number]    Referring Physician:  Cynthia Garcia M.D.     GI:JANA Julian     Reason for Consultation: psoas abscess possibly due to IBD    History of Present Illness:   Is a pleasant 72-year-old female with a past medical history including GERD, Zheng's esophagus, alcohol use, left renal mass/RP mass followed by Dr. Campos who presented to [facility] for right-sided flank pain.  She reports has been having right-sided flank pain for the past 2 months.  She was found to have a right-sided kidney mass on 1/10/2023 with subsequent biopsy on 2/6/2023.  She had outpatient office visit with Dr. Campos from oncology who saw the biopsy results and noted that it was an abscess and sent patient to the emergency department.  CT scan was obtained and found psoas abscess.  Is s/p IR drain placement 2/24/2023-cultures grew Citrobacter koseri. ID consulted and patient started on Zosyn.  The CT scan on 2/28/2023 showed increase in size of psoas abscess measuring 3.9 x 4.7 cm with pigtail drainage catheter in place, trace blood effusions, splenomegaly, nonobstructive left renal stones, mesenteric and body wall edema.  Additionally, there was suboptimal evaluation for colonic wall thickening due to areas of bowel decompression.  There was possibly some sigmoid and additional scattered colonic wall thickening and colitis could not  be excluded.  Scattered colonic diverticulosis was seen.  Abscess noted to be multiloculated and per IR, drain was okay to be discontinued on 3/1/2023 due to minimal drainage output.  New drain was placed on 3/7/2023.  Patient reports since being admitted, she has had multiple episodes of diarrhea up to 5/day.  She denies any chronic or bloody diarrhea prior to admission.  On chart review, patient has history of inflammatory bowel disease.  But upon questioning, she denies any previous knowledge of  inflammatory bowel disease, or any other GI issues other than Zheng's esophagus.    EGD/colonoscopy report from 2015 showed mucosal irregularities at the GE junction were obtained and negative.  She had several nonbleeding diverticula throughout her colon and diverticulosis severity appeared to be mild in severity.  Nonbleeding internal hemorrhoids were also noted.     Otherwise the patient is doing fine without complaints of fever/ chills/ weight loss/ appetite change/ dysphagia/ odynophagia/ heartburn/ nausea/ vomiting/ bloating/ constipation/ melena/ hematochezia or abdominal pain.    Tobacco use: Patient reports quitting smoking in   Alcohol use: Patient reports drinking daily beers with apple juice  Illicit drug use: Patient reports occasional marijuana use    Last EGD: See above   Last colonoscopy: See above  NSAID/ASA use: Denies  Anticoagulation use: Denies      Past Medical History:   Diagnosis Date    Fracture, femur closed, shaft (HCC) 2013    right-fall standing height    Fracture of humeral head, right, closed 2005    fall standing height    Wrist fracture, left     fall standing height    Zheng's esophagus     Cataract     Depression     GERD (gastroesophageal reflux disease)     H/O measles     History of chickenpox     IBD (inflammatory bowel disease)     Kyphosis     OSTEOPOROSIS          Past Surgical History:   Procedure Laterality Date    CATARACT EXTRACTION WITH IOL Bilateral 3/2016    ORIF, FRACTURE, FEMUR  2013    right-Rogalski    ORIF, FRACTURE, HUMERUS, PROXIMAL Right     Dr Herrera       Family History   Problem Relation Age of Onset    Osteoporosis Mother     Cancer Father        Social History     Socioeconomic History    Marital status:    Tobacco Use    Smoking status: Former     Packs/day: 0.50     Years: 35.00     Pack years: 17.50     Types: Cigarettes     Quit date: 2010     Years since quittin.1    Smokeless tobacco: Never   Substance and  Sexual Activity    Alcohol use: Yes     Alcohol/week: 8.4 oz     Types: 14 Cans of beer per week    Drug use: No       Review of systems:  Review of Systems   Constitutional:  Negative for chills, fever and malaise/fatigue.   HENT:  Negative for congestion and sore throat.    Respiratory:  Negative for cough and shortness of breath.    Cardiovascular:  Negative for chest pain and leg swelling.   Gastrointestinal:  Positive for diarrhea. Negative for abdominal pain, blood in stool, constipation, melena, nausea and vomiting.   Genitourinary:  Negative for dysuria and flank pain.   All other systems reviewed and are negative.      Physical Exam:  Vitals:    03/07/23 1905 03/08/23 0401 03/08/23 0408 03/08/23 0753   BP: (!) 148/89 135/78  (!) 144/73   Pulse: 92 88  87   Resp: 19 16  15   Temp: 37.1 °C (98.7 °F) 36.8 °C (98.2 °F)  37.4 °C (99.3 °F)   TempSrc: Temporal Temporal  Temporal   SpO2: 100% 94%  99%   Weight:   48.5 kg (106 lb 14.8 oz)    Height:           Physical Exam      Labs:  Recent Labs     03/06/23  0003 03/07/23  0029   WBC 9.5 7.9   RBC 4.31 3.63*   HEMOGLOBIN 11.1* 9.4*   HEMATOCRIT 35.1* 29.1*   MCV 81.4 80.2*   MCH 25.8* 25.9*   MCHC 31.6* 32.3*   RDW 54.8* 56.5*   PLATELETCT 333 271   MPV 11.0 12.1     Recent Labs     03/06/23  0003 03/07/23  0029   SODIUM 133* 137   POTASSIUM 4.0 3.9   CHLORIDE 98 102   CO2 24 25   GLUCOSE 112* 119*   BUN 13 17           Recent Labs     03/06/23  0003 03/07/23  0029   ASTSGOT 18 16   ALTSGPT 15 10   TBILIRUBIN 0.2 0.2   ALKPHOSPHAT 79 80   GLOBULIN 3.5 3.0         Imaging:  CT-DRAIN-RETROPERITONEAL  Narrative: 3/7/2023 10:04 AM    HISTORY/REASON FOR EXAM:  Previous right psoas abscess. Drain was placed on 2/24 and discontinued 3/1. There was concern abscess was reaccumulating on exam. I did order an US showing bigger abscess.    TECHNIQUE/EXAM DESCRIPTION:  Right psoas abscess drainage with CT guidance.    Low dose optimization technique was utilized for this CT  exam including automated exposure control and adjustment of the mA and/or kV according to patient size.    PROCEDURE:    SEDATION: Moderate sedation was provided. Pulse oximetry and continuous cardiac monitoring by the nurse was performed throughout the exam. Intraservice time was 30 minutes.    Informed consent was obtained.    Localizing CT images were obtained with the patient in prone position.    The skin was prepped with Betadine and draped in a sterile fashion.    Following local anesthesia with 1% Lidocaine, a 17 G guiding needle was placed and needle path confirmed with CT.    An Amplatz guidewire was placed and following serial tract dilatation, a 12 [place] pigtail locking catheter was placed.    A specimen was collected and submitted for culture and sensitivity and Gram stain.    Total of 8 mLs of old bloody fluid was drained.    The catheter was secured to the skin and connected to suction bulb drainage.    Final CT images were obtained documenting catheter position.    The patient tolerated the procedure well with no evidence of complication.    COMPARISON: CT scan abdomen/pelvis 2/28/2023    FINDINGS:  The final CT images show satisfactory catheter position within the target collection.  Impression: 1.  CT guided right psoas hematoma/abscess catheter drainage.  2.  The current plan is to obtain a follow-up CT scan in 5-7 days.            Impressions:  Psoas abscess s/p IR drain placement  Left renal/right pulm mass followed by Dr. Campos  History of Zheng's esophagus  History of diverticulosis  History of GERD      MDM:  This is a pleasant 72-year-old female presenting with multiloculated psoas muscle abscess.  Per chart review, she has a history of IBD but she denies any knowledge of this.  Last colonoscopy in 2016 showed mild diverticulosis throughout her entire colon, EGD with mucosal irregularity that was biopsied and subsequently negative.  Given her development of multiloculated psoas muscle  abscess in addition to documented history of IBD, so for possible IBD etiology of psoas muscle abscess.      Recommendations:  Will order fecal calprotectin to further evaluate and pending clinical course and results, may consider colonoscopy      Discussed with patient, Dr. Aguilar, Dr. Garcia.      This note was generated using voice recognition software which has a small chance of producing errors of grammar and possibly content. I have made every reasonable attempt to find and correct any obvious errors, but expect that some may not be found prior to finalization of this note.

## 2023-03-08 NOTE — DIETARY
Nutrition Services Brief Update:     Met wtiht patient at bedside. She had eaten her breakfast sausage and was working on eating more of breakfast.  Patient stated she likes the Boost drinks and is trying to drink 3 per day.      Need more documentation of meal and supplement intake to determine need for TPN.      Recommendations/Interventions/Plan:    TPN to continue at 50% for now  Document intake of all PO as % taken in ADL's to provide interdisciplinary communication across all shifts.   Monitor weight.  Nutrition rep will continue to see patient for ongoing meal and preferences once PO diet in place    RD following

## 2023-03-08 NOTE — PROGRESS NOTES
Breakfast: pt ate 1 sausage anu, 1 piece of pancake, drank 1 container of boost, cranberry juice and water. Pt stated that she usually eats more but she's been interrupted to many times while she was eating breakfast.

## 2023-03-08 NOTE — THERAPY
"Speech Language Pathology  Daily Treatment     Patient Name: Mena Campos  Age:  72 y.o., Sex:  female  Medical Record #: [id number]  Today's Date: 3/8/2023     Precautions  Precautions: Fall Risk  Comments: CIWA    HPI: 72 y.o. woman admitted on 2/23/23 with R psoas abscess. PMH notable for GERD, MDD, EtOH use, and L renal/RP mass.       Subjective:  Patient seen this date for dysphagia management. Patient sitting upright in bed with breakfast tray. She reports no difficulty with eating/drinking and that initial difficulty was a \"fluke.\"       Assessment:  PO presentations of RG7, SB6, and TN0 (cup, straw) from GI Soft breakfast tray. Adequate oral bolus acceptance/containment, complete AP transfer, and prolonged/functional mastication. No cough/throat clear appreciated with PO. Vocal quality remained stable throughout assessment. Single swallow completed per bolus. Provided education regarding general aspiration precautions, signs of aspiration, and reflux precautions. All questions addressed.        Clinical Impressions:   Patient presents with clinically functional oropharyngeal swallow. Acute SLP intervention is not warranted at this time.      Recommendations  1.  Diet per MD (GI Soft)  2.  Instrumentation: None indicated at this time  3.  Swallowing Instructions & Precautions:   Supervision: Assist with meal tray set up  Positioning: Fully upright and midline during oral intake, Remain upright for 30-45 minutes after oral intake  Medication: As tolerated  Strategies: Alternate bites and sips, Slow rate of intake  Oral Care: BID  4.  Acute SLP to sign off; please re-consult should change in swallow function occur          Plan    Discharge secondary to goals met.    Discharge Recommendations: Anticipate that the patient will have no further speech therapy needs after discharge from the hospital       Objective       03/08/23 0944   Patient / Family Goals   Patient / Family Goal #1 \"I need water.\"   Goal " #1 Outcome Goal met   Short Term Goals   Short Term Goal # 1 Pt will follow directives during MBS with moderate cues.   Goal Outcome # 1 Goal not met   Short Term Goal # 2 Pt will consume a diet of reg/thins with adherence to reflux precautions and no s/sx of aspiraiton with min cues   Goal Outcome # 2  Goal met   Short Term Goal # 3 Patient will consume a full liquid diet with no overt s/sx of aspiration with adherence to reflux precautions   Goal Outcome  # 3 Goal met

## 2023-03-08 NOTE — CARE PLAN
The patient is Stable - Low risk of patient condition declining or worsening    Shift Goals  Clinical Goals: pain control, rest, stool culture, tpn, haq care  Patient Goals: rest, feel better  Family Goals: octavia    Progress made toward(s) clinical / shift goals:   Problem: Knowledge Deficit - Standard  Goal: Patient and family/care givers will demonstrate understanding of plan of care, disease process/condition, diagnostic tests and medications  Outcome: Progressing     Problem: Urinary - Renal Perfusion  Goal: Ability to achieve and maintain adequate renal perfusion and functioning will improve  Outcome: Progressing     Problem: Respiratory  Goal: Patient will achieve/maintain optimum respiratory ventilation and gas exchange  Outcome: Progressing     Problem: Pain - Standard  Goal: Alleviation of pain or a reduction in pain to the patient’s comfort goal  Outcome: Progressing     Problem: Fall Risk  Goal: Patient will remain free from falls  Outcome: Progressing     Problem: Skin Integrity  Goal: Skin integrity is maintained or improved  Outcome: Progressing       Patient is not progressing towards the following goals:

## 2023-03-09 LAB
ERYTHROCYTE [DISTWIDTH] IN BLOOD BY AUTOMATED COUNT: 59.9 FL (ref 35.9–50)
HCT VFR BLD AUTO: 29 % (ref 37–47)
HGB BLD-MCNC: 9.1 G/DL (ref 12–16)
MCH RBC QN AUTO: 26 PG (ref 27–33)
MCHC RBC AUTO-ENTMCNC: 31.4 G/DL (ref 33.6–35)
MCV RBC AUTO: 82.9 FL (ref 81.4–97.8)
PLATELET # BLD AUTO: 241 K/UL (ref 164–446)
PMV BLD AUTO: 12 FL (ref 9–12.9)
RBC # BLD AUTO: 3.5 M/UL (ref 4.2–5.4)
WBC # BLD AUTO: 6.9 K/UL (ref 4.8–10.8)

## 2023-03-09 PROCEDURE — A9270 NON-COVERED ITEM OR SERVICE: HCPCS | Performed by: INTERNAL MEDICINE

## 2023-03-09 PROCEDURE — A9270 NON-COVERED ITEM OR SERVICE: HCPCS | Performed by: STUDENT IN AN ORGANIZED HEALTH CARE EDUCATION/TRAINING PROGRAM

## 2023-03-09 PROCEDURE — 85027 COMPLETE CBC AUTOMATED: CPT

## 2023-03-09 PROCEDURE — 700102 HCHG RX REV CODE 250 W/ 637 OVERRIDE(OP): Performed by: STUDENT IN AN ORGANIZED HEALTH CARE EDUCATION/TRAINING PROGRAM

## 2023-03-09 PROCEDURE — 99232 SBSQ HOSP IP/OBS MODERATE 35: CPT | Performed by: INTERNAL MEDICINE

## 2023-03-09 PROCEDURE — 700111 HCHG RX REV CODE 636 W/ 250 OVERRIDE (IP): Performed by: INTERNAL MEDICINE

## 2023-03-09 PROCEDURE — 700102 HCHG RX REV CODE 250 W/ 637 OVERRIDE(OP): Performed by: HOSPITALIST

## 2023-03-09 PROCEDURE — 700102 HCHG RX REV CODE 250 W/ 637 OVERRIDE(OP): Performed by: INTERNAL MEDICINE

## 2023-03-09 PROCEDURE — A9270 NON-COVERED ITEM OR SERVICE: HCPCS | Performed by: HOSPITALIST

## 2023-03-09 PROCEDURE — 83993 ASSAY FOR CALPROTECTIN FECAL: CPT

## 2023-03-09 PROCEDURE — 700105 HCHG RX REV CODE 258: Performed by: INTERNAL MEDICINE

## 2023-03-09 PROCEDURE — 770004 HCHG ROOM/CARE - ONCOLOGY PRIVATE *

## 2023-03-09 RX ADMIN — MULTIPLE VITAMINS W/ MINERALS TAB 1 TABLET: TAB at 04:38

## 2023-03-09 RX ADMIN — PIPERACILLIN AND TAZOBACTAM 4.5 G: 4; .5 INJECTION, POWDER, LYOPHILIZED, FOR SOLUTION INTRAVENOUS; PARENTERAL at 18:51

## 2023-03-09 RX ADMIN — ENOXAPARIN SODIUM 40 MG: 40 INJECTION SUBCUTANEOUS at 18:51

## 2023-03-09 RX ADMIN — PIPERACILLIN AND TAZOBACTAM 4.5 G: 4; .5 INJECTION, POWDER, LYOPHILIZED, FOR SOLUTION INTRAVENOUS; PARENTERAL at 08:28

## 2023-03-09 RX ADMIN — Medication 5 MG: at 21:30

## 2023-03-09 RX ADMIN — LIDOCAINE HYDROCHLORIDE 30 ML: 20 SOLUTION OROPHARYNGEAL at 04:38

## 2023-03-09 RX ADMIN — PIPERACILLIN AND TAZOBACTAM 4.5 G: 4; .5 INJECTION, POWDER, LYOPHILIZED, FOR SOLUTION INTRAVENOUS; PARENTERAL at 00:32

## 2023-03-09 RX ADMIN — SERTRALINE 100 MG: 100 TABLET, FILM COATED ORAL at 18:51

## 2023-03-09 RX ADMIN — QUETIAPINE FUMARATE 50 MG: 25 TABLET ORAL at 21:30

## 2023-03-09 RX ADMIN — LIDOCAINE HYDROCHLORIDE 30 ML: 20 SOLUTION OROPHARYNGEAL at 21:30

## 2023-03-09 RX ADMIN — LIDOCAINE HYDROCHLORIDE 30 ML: 20 SOLUTION OROPHARYNGEAL at 14:39

## 2023-03-09 RX ADMIN — SERTRALINE 100 MG: 100 TABLET, FILM COATED ORAL at 04:38

## 2023-03-09 ASSESSMENT — ENCOUNTER SYMPTOMS
WEAKNESS: 0
COUGH: 0
FEVER: 0
CHILLS: 0
HEADACHES: 0
CONSTIPATION: 0
DIARRHEA: 1
FLANK PAIN: 0
SHORTNESS OF BREATH: 0
ROS GI COMMENTS: POOR APPETITE
ABDOMINAL PAIN: 0
INSOMNIA: 0
SORE THROAT: 0
BLOOD IN STOOL: 0
MYALGIAS: 0
NAUSEA: 0
VOMITING: 0
NERVOUS/ANXIOUS: 0
FALLS: 0

## 2023-03-09 ASSESSMENT — PAIN DESCRIPTION - PAIN TYPE: TYPE: ACUTE PAIN

## 2023-03-09 ASSESSMENT — LIFESTYLE VARIABLES: SUBSTANCE_ABUSE: 0

## 2023-03-09 NOTE — CARE PLAN
The patient is Stable - Low risk of patient condition declining or worsening    Shift Goals  Clinical Goals: Monitor labs, Monitor bowel movements  Patient Goals: Rest  Family Goals: feel better    Progress made toward(s) clinical / shift goals:    Problem: Knowledge Deficit - Standard  Goal: Patient and family/care givers will demonstrate understanding of plan of care, disease process/condition, diagnostic tests and medications  Outcome: Progressing     Problem: Pain - Standard  Goal: Alleviation of pain or a reduction in pain to the patient’s comfort goal  Outcome: Progressing       Patient is not progressing towards the following goals:

## 2023-03-09 NOTE — PROGRESS NOTES
[facility] Medicine Daily Progress Note    Date of Service  3/9/2023    Chief Complaint  Mena Campos is a 72 y.o. female with GERD, MDD, EtOH use DO, and Left Renal / RP mass followed by oncologist Dr. Campos admitted 2/23/2023 with Right psoas abscess    Hospital Course  No notes on file    Mena Campos is a 72 y.o. female who presented 2/23/2023 with past medical history of repeated urine infection who comes into the hospital for right-sided flank pain.  This has been occurring for the past 2 months.  She was found to have right-sided kidney mass on January 10.  She had a biopsy of this mass on February 6.  She went to visit an oncologist today Dr. Campos who saw the biopsy results and noticed it being an abscess.  He sent her to the emergency room right away.  Repeat CT scan was completed found a psoas abscess.  General surgery recommended IR drainage.  Status post IR drain placed 2/24.  Cultures grew Citrobacter koseri.  ID was consulted.  Patient was started on Zosyn.    Repeat CT on 2/28 showed increase in size of psoas abscess measuring 3.9 x 4.7 cm with pigtail drainage catheter in place, trace blood fusions, splenomegaly, nonobstructive left renal stones, mesenteric and body wall edema.  C. difficile is negative.    Discussed with interventional radiology Dr. Sandoval.  Psoas abscess was multiloculated.  Drain has minimal output and was discontinued on 3/1.  Remaining abscess is loculated and not connected to the drain, it is currently too small to place a new drain and will hopefully resolve with antibiotics.    Hospital course was also complicated by alcohol withdrawal.    PICC line was placed on 3/4 and patient was started on TPN.    Interval Problem Update  Patient was seen and examined at bedside.  I have personally reviewed and interpreted vitals, labs, and imaging.      - afebrile  - have 2 episodes of diarrhea today, only once yesterday  - holding TPN for now, encouraged PO, monitor  I/O  - drain output = 35cc  - on zosyn    I have discussed this patient's plan of care and discharge plan at IDT rounds today with Case Management, Nursing, Nursing leadership, and other members of the IDT team.    Consultants/Specialty  general surgery, infectious disease, and interventional radiology    Code Status  Full Code    Disposition  Patient is not medically cleared for discharge.   Anticipate discharge to to home with organized home healthcare and close outpatient follow-up.  I have placed the appropriate orders for post-discharge needs.    Review of Systems  Review of Systems   Constitutional:  Negative for chills and fever.   Gastrointestinal:  Positive for diarrhea. Negative for abdominal pain, blood in stool, constipation, melena, nausea and vomiting.        Poor appetite       Physical Exam  Temp:  [36.7 °C (98 °F)-36.9 °C (98.5 °F)] 36.9 °C (98.4 °F)  Pulse:  [] 85  Resp:  [17-18] 17  BP: (135-150)/(65-79) 142/79  SpO2:  [96 %-99 %] 99 %    Physical Exam  Vitals and nursing note reviewed. Exam conducted with a chaperone present.   Constitutional:       Appearance: She is cachectic. She is ill-appearing (Chronically).   HENT:      Head: Normocephalic and atraumatic.      Comments: Bitemporal wasting     Right Ear: External ear normal.      Left Ear: External ear normal.      Nose: Nose normal.      Mouth/Throat:      Mouth: Mucous membranes are moist.   Eyes:      General: No scleral icterus.     Conjunctiva/sclera: Conjunctivae normal.   Cardiovascular:      Rate and Rhythm: Normal rate and regular rhythm.      Pulses: Normal pulses.      Heart sounds: Normal heart sounds. No murmur heard.    No friction rub. No gallop.   Pulmonary:      Effort: Pulmonary effort is normal. No respiratory distress.      Breath sounds: Normal breath sounds. No wheezing, rhonchi or rales.   Abdominal:      General: Abdomen is flat. Bowel sounds are normal. There is distension.      Palpations: Abdomen is soft.       Tenderness: There is no abdominal tenderness. There is no guarding or rebound.   Genitourinary:     Comments: +Ferrera, urine yellow / clear  Musculoskeletal:      Cervical back: Neck supple.      Right lower leg: No edema.      Left lower leg: No edema.   Skin:     General: Skin is warm.      Findings: Erythema (around vulva area) present.      Comments: Drain in place in back, draining bloody serosang drainage   Neurological:      General: No focal deficit present.      Mental Status: She is alert and oriented to person, place, and time. Mental status is at baseline.      Motor: No tremor.   Psychiatric:         Attention and Perception: Attention and perception normal.         Mood and Affect: Mood normal. Affect is blunt.         Speech: Speech normal.         Behavior: Behavior normal. Behavior is cooperative.       Fluids    Intake/Output Summary (Last 24 hours) at 3/9/2023 1034  Last data filed at 3/9/2023 0613  Gross per 24 hour   Intake --   Output 4235 ml   Net -4235 ml         Laboratory  Recent Labs     03/07/23  0029 03/09/23  0038   WBC 7.9 6.9   RBC 3.63* 3.50*   HEMOGLOBIN 9.4* 9.1*   HEMATOCRIT 29.1* 29.0*   MCV 80.2* 82.9   MCH 25.9* 26.0*   MCHC 32.3* 31.4*   RDW 56.5* 59.9*   PLATELETCT 271 241   MPV 12.1 12.0       Recent Labs     03/07/23  0029   SODIUM 137   POTASSIUM 3.9   CHLORIDE 102   CO2 25   GLUCOSE 119*   BUN 17   CREATININE 0.93   CALCIUM 9.5                         Imaging  CT-DRAIN-RETROPERITONEAL   Final Result      1.  CT guided right psoas hematoma/abscess catheter drainage.   2.  The current plan is to obtain a follow-up CT scan in 5-7 days.      US-ABDOMEN LTD (SOFT TISSUE)   Final Result      Complex fluid collection within the subcutaneous fat of the patient's right lower back in the area of the prior percutaneous drain. This measures 4.8 x 2.6 x 5.8 cm in size and likely represents subcutaneous abscess.      IR-PICC LINE PLACEMENT W/ GUIDANCE > AGE 5   Final Result                   Ultrasound-guided PICC placement performed by qualified nursing staff as    above.          CT-ABDOMEN-PELVIS WITH   Final Result      1.  Marked interval decrease in size of large right psoas, posterior pararenal and subcutaneous right flank abscess. There is percutaneous pigtail drainage catheter in place. There is residual abscess in the subcutaneous tissues measuring about 3.9 x 4.7    cm.   2.  Trace pleural effusions.   3.  Splenomegaly.   4.  Nonobstructing left renal stones.   5.  Limited evaluation for bowel wall thickening due to decompression. There could be sigmoid colon wall thickening and some scattered fluid within the colon. Correlate for colitis. No bowel obstruction.   6.  Mesenteric and body wall edema.      CT-DRAIN-RETROPERITONEAL   Final Result      1.  CT guided placement of a percutaneous drainage catheter in a right psoas fluid collection.   2.  The current plan is to obtain a follow-up CT scan in 5-7 days.      US-EXTREMITY VENOUS LOWER BILAT   Final Result      OUTSIDE IMAGES-CT ABDOMEN /PELVIS   Final Result      [id number] VIEW   Final Result      Loops of bowel and colon are somewhat indistinct, possibly related to technique. Appearance appears overall somewhat similar to outside facility CT abdomen pelvis from 2/23/2023. If symptoms have changed from recent CT, CT evaluation is recommended.             Assessment/Plan  * Psoas abscess (HCC)- (present on admission)  Assessment & Plan  Presented to [facility] [place] [facility] with Right flank pain  CT demonstrated psoas abscess prompting transfer to [facility] for IR  General surgery consulted and s/o, recommended IR-guided drainage  IR-guided drainage 2/24/23, Repeat CT 2/28/23  - requested RN flushes BID and record output  Abscess culture +Citrobacter koseri, pan-susceptible  Continue zosyn for now  ID consulted for antibiotic planning after source control  HIV negative    Drain discontinued 3/1  Repeat drain replaced 3/7, repeat CT AP in ~5-7 days,  3/13  If still has abscess will need to consult ID again    Colitis  Assessment & Plan  Patient with persistent abdominal distention, pain, not tolerating PO   On TPN  -Psoas abscess  - given need for TPN, persistent abd distention, diarrhea, and possible h/o IBD? (patient denies, says her diarrhea was fixed with metamucil), consult GI today    Urinary retention  Assessment & Plan  Likely due to oxybutynin - discontinued  No UA from prior to haq placement  Zosyn expected to cover most urine pathogens - culture deferred  Trial haq discontinuation prior to discharge, if unsuccessful will refer to urology  Hold off on voiding trial for now as patient still having significant diarrhea and vulva dermatitis from moisture     External hemorrhoids  Assessment & Plan  Bowel protocol - senna QHS PRN, miralax PRN    Other dysphagia- (present on admission)  Assessment & Plan  Resolved  SLP evaluation  Modified barium swallow deferred per SLP recommendation    Retroperitoneal mass- (present on admission)  Assessment & Plan  Underwent biopsy of Left renal pole mass extending into RP, it was not indicative of malignancy  Follow up with Oncologist Dr. Campos for further diagnostic evaluation after discharge    Hypokalemia- (present on admission)  Assessment & Plan  Resolved  On TPN  CTM    Hyponatremia- (present on admission)  Assessment & Plan  Resolved  On TPN  CTM    Microcytic anemia- (present on admission)  Assessment & Plan  Follows with Heme-Onc Dr. Campos  Elevated ferritin indicative of AOCD  Transfuse for Hgb <7    Alcohol withdrawal syndrome, with delirium (HCC)- (present on admission)  Assessment & Plan  Resolved  Reports >2 beers daily, most recent 2/22/23  Developed diaphoresis, delirium, tremor, tachycardia on HD#2  CIWA-Lorazepam protocol  Empiric MVN + B12 + Folate + Thiamine  Will discuss MAT / AA prior to discharge    Leg swelling- (present on admission)  Assessment & Plan  BLE US negative for DVT  TTE  deferred    Acute encephalopathy- (present on admission)  Assessment & Plan  Resolved  Likely multifactorial including underlying abscess and EtOH withdrawal - see separate plans  Nonfocal, CTH deferred    Sepsis with encephalopathy without septic shock (HCC)- (present on admission)  Assessment & Plan  This is Sepsis Present on admission  SIRS criteria identified on my evaluation include: Fever, with temperature greater than 101 deg F, Tachypnea, with respirations greater than 20 per minute and Leukocytosis, with WBC greater than 12,000  Source is right psoas abscess  Sepsis protocol initiated  Fluid resuscitation ordered per protocol  Crystalloid Fluid Administration: Fluid resuscitation ordered per standard protocol - 30 mL/kg per current or ideal body weight  IV antibiotics as appropriate for source of sepsis  Reassessment: I have reassessed the patient's hemodynamic status    BCx 2/23: NGTD  Abscess Cx 2/24: +Citrobacter koseri  Continue zosyn for intra-abdominal abscess      Gastroesophageal reflux disease with esophagitis- (present on admission)  Assessment & Plan  Likely exacerbated by EtOH use DO  Continue omeprazole    OAB (overactive bladder)- (present on admission)  Assessment & Plan  Discontinued oxybutynin due to urinary retention    Moderate episode of recurrent major depressive disorder (HCC)- (present on admission)  Assessment & Plan  Continue sertraline       VTE prophylaxis: SCDs/TEDs and enoxaparin ppx    I have performed a physical exam and reviewed and updated ROS and Plan today (3/9/2023). In review of yesterday's note (3/8/2023), there are no changes except as documented above.

## 2023-03-09 NOTE — CARE PLAN
The patient is Stable - Low risk of patient condition declining or worsening    Shift Goals  Clinical Goals: Monitor labs, Monitor bowel movements  Patient Goals: Rest  Family Goals: feel better    Progress made toward(s) clinical / shift goals:  education done on POC, including need dto     Patient is not progressing towards the following goals:      Problem: Knowledge Deficit - Standard  Goal: Patient and family/care givers will demonstrate understanding of plan of care, disease process/condition, diagnostic tests and medications  Outcome: Progressing     Problem: Pain - Standard  Goal: Alleviation of pain or a reduction in pain to the patient’s comfort goal  Outcome: Progressing     Problem: Skin Integrity  Goal: Skin integrity is maintained or improved  Outcome: Progressing

## 2023-03-10 ENCOUNTER — APPOINTMENT (OUTPATIENT)
Dept: RADIOLOGY | Facility: MEDICAL CENTER | Age: 73
DRG: 871 | End: 2023-03-10
Attending: INTERNAL MEDICINE
Payer: MEDICARE

## 2023-03-10 LAB
BACTERIA STL CULT: NORMAL
BACTERIA WND AEROBE CULT: NORMAL
C JEJUNI+C COLI AG STL QL: NORMAL
GRAM STN SPEC: NORMAL
SIGNIFICANT IND 70042: NORMAL
SIGNIFICANT IND 70042: NORMAL
SITE SITE: NORMAL
SITE SITE: NORMAL
SOURCE SOURCE: NORMAL
SOURCE SOURCE: NORMAL

## 2023-03-10 PROCEDURE — 700102 HCHG RX REV CODE 250 W/ 637 OVERRIDE(OP): Performed by: INTERNAL MEDICINE

## 2023-03-10 PROCEDURE — 700111 HCHG RX REV CODE 636 W/ 250 OVERRIDE (IP): Performed by: INTERNAL MEDICINE

## 2023-03-10 PROCEDURE — A9270 NON-COVERED ITEM OR SERVICE: HCPCS | Performed by: STUDENT IN AN ORGANIZED HEALTH CARE EDUCATION/TRAINING PROGRAM

## 2023-03-10 PROCEDURE — 700102 HCHG RX REV CODE 250 W/ 637 OVERRIDE(OP): Performed by: HOSPITALIST

## 2023-03-10 PROCEDURE — A9270 NON-COVERED ITEM OR SERVICE: HCPCS | Performed by: INTERNAL MEDICINE

## 2023-03-10 PROCEDURE — 700102 HCHG RX REV CODE 250 W/ 637 OVERRIDE(OP): Performed by: STUDENT IN AN ORGANIZED HEALTH CARE EDUCATION/TRAINING PROGRAM

## 2023-03-10 PROCEDURE — 770004 HCHG ROOM/CARE - ONCOLOGY PRIVATE *

## 2023-03-10 PROCEDURE — 99232 SBSQ HOSP IP/OBS MODERATE 35: CPT | Performed by: INTERNAL MEDICINE

## 2023-03-10 PROCEDURE — A9270 NON-COVERED ITEM OR SERVICE: HCPCS | Performed by: HOSPITALIST

## 2023-03-10 PROCEDURE — 700105 HCHG RX REV CODE 258: Performed by: INTERNAL MEDICINE

## 2023-03-10 RX ORDER — PREDNISONE 50 MG/1
50 TABLET ORAL EVERY 6 HOURS
Status: DISCONTINUED | OUTPATIENT
Start: 2023-03-10 | End: 2023-03-10

## 2023-03-10 RX ORDER — DIPHENHYDRAMINE HCL 25 MG
50 TABLET ORAL ONCE
Status: DISCONTINUED | OUTPATIENT
Start: 2023-03-10 | End: 2023-03-10

## 2023-03-10 RX ADMIN — Medication 5 MG: at 21:18

## 2023-03-10 RX ADMIN — QUETIAPINE FUMARATE 50 MG: 25 TABLET ORAL at 21:18

## 2023-03-10 RX ADMIN — PIPERACILLIN AND TAZOBACTAM 4.5 G: 4; .5 INJECTION, POWDER, LYOPHILIZED, FOR SOLUTION INTRAVENOUS; PARENTERAL at 17:17

## 2023-03-10 RX ADMIN — PIPERACILLIN AND TAZOBACTAM 4.5 G: 4; .5 INJECTION, POWDER, LYOPHILIZED, FOR SOLUTION INTRAVENOUS; PARENTERAL at 00:21

## 2023-03-10 RX ADMIN — PIPERACILLIN AND TAZOBACTAM 4.5 G: 4; .5 INJECTION, POWDER, LYOPHILIZED, FOR SOLUTION INTRAVENOUS; PARENTERAL at 10:23

## 2023-03-10 RX ADMIN — LIDOCAINE HYDROCHLORIDE 30 ML: 20 SOLUTION OROPHARYNGEAL at 14:39

## 2023-03-10 RX ADMIN — ENOXAPARIN SODIUM 40 MG: 40 INJECTION SUBCUTANEOUS at 17:17

## 2023-03-10 RX ADMIN — LIDOCAINE HYDROCHLORIDE 30 ML: 20 SOLUTION OROPHARYNGEAL at 04:40

## 2023-03-10 RX ADMIN — MULTIPLE VITAMINS W/ MINERALS TAB 1 TABLET: TAB at 04:40

## 2023-03-10 RX ADMIN — SERTRALINE 100 MG: 100 TABLET, FILM COATED ORAL at 04:40

## 2023-03-10 RX ADMIN — LIDOCAINE HYDROCHLORIDE 30 ML: 20 SOLUTION OROPHARYNGEAL at 21:18

## 2023-03-10 RX ADMIN — SERTRALINE 100 MG: 100 TABLET, FILM COATED ORAL at 17:17

## 2023-03-10 ASSESSMENT — ENCOUNTER SYMPTOMS
CHILLS: 0
CONSTIPATION: 0
NAUSEA: 0
FEVER: 0
ABDOMINAL PAIN: 0
BLOOD IN STOOL: 0
ROS GI COMMENTS: POOR APPETITE
VOMITING: 0
DIARRHEA: 1

## 2023-03-10 ASSESSMENT — PAIN DESCRIPTION - PAIN TYPE: TYPE: ACUTE PAIN

## 2023-03-10 NOTE — PROGRESS NOTES
[facility] Medicine Daily Progress Note    Date of Service  3/10/2023    Chief Complaint  Mena Campos is a 72 y.o. female with GERD, MDD, EtOH use DO, and Left Renal / RP mass followed by oncologist Dr. Campos admitted 2/23/2023 with Right psoas abscess    Hospital Course  No notes on file    Mena Campos is a 72 y.o. female who presented 2/23/2023 with past medical history of repeated urine infection who comes into the hospital for right-sided flank pain.  This has been occurring for the past 2 months.  She was found to have right-sided kidney mass on January 10.  She had a biopsy of this mass on February 6.  She went to visit an oncologist today Dr. Campos who saw the biopsy results and noticed it being an abscess.  He sent her to the emergency room right away.  Repeat CT scan was completed found a psoas abscess.  General surgery recommended IR drainage.  Status post IR drain placed 2/24.  Cultures grew Citrobacter koseri.  ID was consulted.  Patient was started on Zosyn.    Repeat CT on 2/28 showed increase in size of psoas abscess measuring 3.9 x 4.7 cm with pigtail drainage catheter in place, trace blood fusions, splenomegaly, nonobstructive left renal stones, mesenteric and body wall edema.  C. difficile is negative.    Discussed with interventional radiology Dr. Sandoval.  Psoas abscess was multiloculated.  Drain has minimal output and was discontinued on 3/1.  Remaining abscess is loculated and not connected to the drain, it is currently too small to place a new drain and will hopefully resolve with antibiotics.    Hospital course was also complicated by alcohol withdrawal.    PICC line was placed on 3/4 and patient was started on TPN.    Interval Problem Update  Patient was seen and examined at bedside.  I have personally reviewed and interpreted vitals, labs, and imaging.      - GI offered colonoscopy but patient wanted to see fecal calprotectin back first  - tolerating PO  - still having  diarrhea, 3 episodes so far today  - drain output = 5cc, and no longer functioning, only been in 3 days, will talk to IR    I have discussed this patient's plan of care and discharge plan at IDT rounds today with Case Management, Nursing, Nursing leadership, and other members of the IDT team.    Consultants/Specialty  general surgery, infectious disease, and interventional radiology    Code Status  Full Code    Disposition  Patient is not medically cleared for discharge.   Anticipate discharge to to home with organized home healthcare and close outpatient follow-up.  I have placed the appropriate orders for post-discharge needs.    Review of Systems  Review of Systems   Constitutional:  Negative for chills and fever.   Gastrointestinal:  Positive for diarrhea. Negative for abdominal pain, blood in stool, constipation, melena, nausea and vomiting.        Poor appetite       Physical Exam  Temp:  [36.3 °C (97.3 °F)-37.3 °C (99.2 °F)] 36.7 °C (98.1 °F)  Pulse:  [80-95] 89  Resp:  [16-20] 17  BP: (133-164)/(73-91) 136/87  SpO2:  [94 %-99 %] 97 %    Physical Exam  Vitals and nursing note reviewed. Exam conducted with a chaperone present.   Constitutional:       Appearance: She is cachectic. She is ill-appearing (Chronically).   HENT:      Head: Normocephalic and atraumatic.      Comments: Bitemporal wasting     Right Ear: External ear normal.      Left Ear: External ear normal.      Nose: Nose normal.      Mouth/Throat:      Mouth: Mucous membranes are moist.   Eyes:      General: No scleral icterus.     Conjunctiva/sclera: Conjunctivae normal.   Cardiovascular:      Rate and Rhythm: Normal rate and regular rhythm.      Pulses: Normal pulses.      Heart sounds: Normal heart sounds. No murmur heard.    No friction rub. No gallop.   Pulmonary:      Effort: Pulmonary effort is normal. No respiratory distress.      Breath sounds: Normal breath sounds. No wheezing, rhonchi or rales.   Abdominal:      General: Abdomen is flat.  Bowel sounds are normal. There is distension.      Palpations: Abdomen is soft.      Tenderness: There is no abdominal tenderness. There is no guarding or rebound.      Comments: RADHA drain in place R back with serosang drainage in bulb, bulb no longer holding suction   Genitourinary:     Comments: +Ferrera, urine yellow / clear  Musculoskeletal:      Cervical back: Neck supple.      Right lower leg: No edema.      Left lower leg: No edema.   Skin:     General: Skin is warm.      Findings: Erythema (around vulva area) present.   Neurological:      Mental Status: She is alert and oriented to person, place, and time. Mental status is at baseline.      Motor: No tremor.   Psychiatric:         Attention and Perception: Attention and perception normal.         Mood and Affect: Mood normal. Affect is blunt.         Speech: Speech normal.         Behavior: Behavior normal. Behavior is cooperative.       Fluids    Intake/Output Summary (Last 24 hours) at 3/10/2023 1450  Last data filed at 3/10/2023 1341  Gross per 24 hour   Intake 360 ml   Output 3755 ml   Net -3395 ml         Laboratory  Recent Labs     03/09/23  0038   WBC 6.9   RBC 3.50*   HEMOGLOBIN 9.1*   HEMATOCRIT 29.0*   MCV 82.9   MCH 26.0*   MCHC 31.4*   RDW 59.9*   PLATELETCT 241   MPV 12.0                               Imaging  CT-DRAIN-RETROPERITONEAL   Final Result      1.  CT guided right psoas hematoma/abscess catheter drainage.   2.  The current plan is to obtain a follow-up CT scan in 5-7 days.      US-ABDOMEN LTD (SOFT TISSUE)   Final Result      Complex fluid collection within the subcutaneous fat of the patient's right lower back in the area of the prior percutaneous drain. This measures 4.8 x 2.6 x 5.8 cm in size and likely represents subcutaneous abscess.      IR-PICC LINE PLACEMENT W/ GUIDANCE > AGE 5   Final Result                  Ultrasound-guided PICC placement performed by qualified nursing staff as    above.          CT-ABDOMEN-PELVIS WITH   Final  Result      1.  Marked interval decrease in size of large right psoas, posterior pararenal and subcutaneous right flank abscess. There is percutaneous pigtail drainage catheter in place. There is residual abscess in the subcutaneous tissues measuring about 3.9 x 4.7    cm.   2.  Trace pleural effusions.   3.  Splenomegaly.   4.  Nonobstructing left renal stones.   5.  Limited evaluation for bowel wall thickening due to decompression. There could be sigmoid colon wall thickening and some scattered fluid within the colon. Correlate for colitis. No bowel obstruction.   6.  Mesenteric and body wall edema.      CT-DRAIN-RETROPERITONEAL   Final Result      1.  CT guided placement of a percutaneous drainage catheter in a right psoas fluid collection.   2.  The current plan is to obtain a follow-up CT scan in 5-7 days.      US-EXTREMITY VENOUS LOWER BILAT   Final Result      OUTSIDE IMAGES-CT ABDOMEN /PELVIS   Final Result      [id number] VIEW   Final Result      Loops of bowel and colon are somewhat indistinct, possibly related to technique. Appearance appears overall somewhat similar to outside facility CT abdomen pelvis from 2/23/2023. If symptoms have changed from recent CT, CT evaluation is recommended.      IR-CONSULT AND TREAT    (Results Pending)          Assessment/Plan  * Psoas abscess (HCC)- (present on admission)  Assessment & Plan  Presented to [facility] with Right flank pain  CT demonstrated psoas abscess prompting transfer to [facility] for IR  General surgery consulted and s/o, recommended IR-guided drainage  IR-guided drainage 2/24/23, Repeat CT 2/28/23  - requested RN flushes BID and record output  Abscess culture +Citrobacter koseri, pan-susceptible  Continue zosyn for now  ID consulted for antibiotic planning after source control  HIV negative    Drain discontinued 3/1  Repeat drain replaced 3/7, repeat CT AP in ~5-7 days, 3/13  If still has abscess will need to consult ID again    Drain not holding suction,  malfunctioning, will talk to IR today about replacement vs repeat CT AP    Colitis  Assessment & Plan  Patient with persistent abdominal distention, pain, not tolerating PO   On TPN  -Psoas abscess  - given need for TPN, persistent abd distention, diarrhea, and possible h/o IBD? (patient denies, says her diarrhea was fixed with metamucil), GI consulted, checked fecal calprotectin, recommended colonoscopy however patient wants to wait until lab results first    - tolerating PO hwoever still having loose stools, off TPN    Urinary retention  Assessment & Plan  Likely due to oxybutynin - discontinued  No UA from prior to haq placement  Zosyn expected to cover most urine pathogens - culture deferred  Trial haq discontinuation prior to discharge, if unsuccessful will refer to urology  Hold off on voiding trial for now as patient still having significant diarrhea and vulva dermatitis from moisture     External hemorrhoids  Assessment & Plan  Bowel protocol - senna QHS PRN, miralax PRN    Other dysphagia- (present on admission)  Assessment & Plan  Resolved  SLP evaluation  Modified barium swallow deferred per SLP recommendation    Retroperitoneal mass- (present on admission)  Assessment & Plan  Underwent biopsy of Left renal pole mass extending into RP, it was not indicative of malignancy  Follow up with Oncologist Dr. Campos for further diagnostic evaluation after discharge    Hypokalemia- (present on admission)  Assessment & Plan  Resolved  On TPN  CTM    Hyponatremia- (present on admission)  Assessment & Plan  Resolved  On TPN  CTM    Microcytic anemia- (present on admission)  Assessment & Plan  Follows with Heme-Onc Dr. Campos  Elevated ferritin indicative of AOCD  Transfuse for Hgb <7    Alcohol withdrawal syndrome, with delirium (HCC)- (present on admission)  Assessment & Plan  Resolved  Reports >2 beers daily, most recent 2/22/23  Developed diaphoresis, delirium, tremor, tachycardia on HD#2  CIWA-Lorazepam  protocol  Empiric MVN + B12 + Folate + Thiamine  Will discuss MAT / AA prior to discharge    Leg swelling- (present on admission)  Assessment & Plan  BLE US negative for DVT  TTE deferred    Acute encephalopathy- (present on admission)  Assessment & Plan  Resolved  Likely multifactorial including underlying abscess and EtOH withdrawal - see separate plans  Nonfocal, CTH deferred    Sepsis with encephalopathy without septic shock (HCC)- (present on admission)  Assessment & Plan  This is Sepsis Present on admission  SIRS criteria identified on my evaluation include: Fever, with temperature greater than 101 deg F, Tachypnea, with respirations greater than 20 per minute and Leukocytosis, with WBC greater than 12,000  Source is right psoas abscess  Sepsis protocol initiated  Fluid resuscitation ordered per protocol  Crystalloid Fluid Administration: Fluid resuscitation ordered per standard protocol - 30 mL/kg per current or ideal body weight  IV antibiotics as appropriate for source of sepsis  Reassessment: I have reassessed the patient's hemodynamic status    BCx 2/23: NGTD  Abscess Cx 2/24: +Citrobacter koseri  Continue zosyn for intra-abdominal abscess      Gastroesophageal reflux disease with esophagitis- (present on admission)  Assessment & Plan  Likely exacerbated by EtOH use DO  Continue omeprazole    OAB (overactive bladder)- (present on admission)  Assessment & Plan  Discontinued oxybutynin due to urinary retention    Moderate episode of recurrent major depressive disorder (HCC)- (present on admission)  Assessment & Plan  Continue sertraline       VTE prophylaxis: SCDs/TEDs and enoxaparin ppx    I have performed a physical exam and reviewed and updated ROS and Plan today (3/10/2023). In review of yesterday's note (3/9/2023), there are no changes except as documented above.

## 2023-03-10 NOTE — CARE PLAN
Follow Up Visit    Chief Complaint   Patient presents with   • Consultation     HCG      HPI:   Benita Hernandez is a 25 year old  female with Past Medical History (PMH) significant for anxiety/depression who presents today for follow up on pregnancy of unknown location with abnormally rising bHCG after home insemination. She presents today with her , Lisandro.    The patient reports that she has not had any severe abdominal pain. She gets some achiness and cramping but this is normal for her. It is always worse on the right side, this is how it was before she was pregnant. There is no severe pain. She reports that she had bleeding for multiple days which was heavier. She had passage of some blood clots and what she thought was some tissue. She was surprised her hCG increased. She stopped taking her prenatal vitamin. The bleeding stopped after a few days and then resumed with very light streaks of blood with wiping after Mateus. She reports that starting yesterday she has felt a bit warm, she took her temperature and it was 99.1 and 98.9. She reports that this is higher for her, but denies any true fevers. She has no chills or dysuria. She is wondering if her urine culture is done.    The following imaging/pathology/lab reports were reviewed together:  Lab Results   Component Value Date/Time    HCGQT 661 (H) 2019 12:01 PM    HCGQT 444 (H) 2019 09:45 AM    HCGQT 241 (H) 2019 08:39 AM    HCGQT 214 (H) 2019 01:46 PM    HCGQT 217 (H) 2019 01:49 PM     19 Pelvic US  The uterus measures 6.9cm in length. There is a 1.2 cm anterior fundal fibroid.     There is no appreciable intrauterine pregnancy. Which may indicate pregnancy failure versus very early pregnancy. Recommend further follow-up with beta hCG measurements and possibly ultrasound.     The right ovary measures 2.7 cm. The left ovary measures 3.7 cm. There is a 1.6 cm left ovarian cyst containing heterogeneous  The patient is Stable - Low risk of patient condition declining or worsening    Shift Goals  Clinical Goals: Skin integrity  Patient Goals: Rest  Family Goals: feel better    Progress made toward(s) clinical / shift goals:    Problem: Knowledge Deficit - Standard  Goal: Patient and family/care givers will demonstrate understanding of plan of care, disease process/condition, diagnostic tests and medications  Outcome: Progressing     Problem: Pain - Standard  Goal: Alleviation of pain or a reduction in pain to the patient’s comfort goal  Outcome: Progressing       Patient is not progressing towards the following goals:       material. There is no surrounding hyperemia or other definite findings of ectopic pregnancy. There is normal ovarian blood flow. There is no abnormal adnexal mass or fluid collection.      IMPRESSION:  No appreciable intrauterine pregnancy.   1.6 cm likely left ovarian corpus luteum cyst without definite ectopic pregnancy. Further follow-up is recommended.    Past Medical History:   Diagnosis Date   • Anxiety    • Arm fracture    • Depressive disorder        Current Outpatient Medications   Medication Sig Dispense Refill   • clotrimazole (LOTRIMIN) 1 % vaginal cream Place 1 applicator vaginally nightly. May apply to the external vulva as well. 45 g 0     No current facility-administered medications for this visit.        ALLERGIES:  No Known Allergies    PMH, Past Surgical History (PSH), and social history were reviewed and updated.    PE:  Visit Vitals  /70   Wt 96.3 kg   LMP 07/05/2019   BMI 33.27 kg/m²     General: No acute distress (NAD), alert, oriented.   Heart: Regular rate and rhythm (RRR),no S3/S4 or murmurs.  Lungs: Clear to auscultation bilaterally (CTAB), no increased work, no rales/rhonchi/wheezing.  Abd: Soft, nontender, positive bowel sounds (+BS). No acute abdomen, no rebound or guarding. No hernias or hepatosplenomegaly  Ext: No edema; calves nontender.  Skin: Warm, dry; no rashes noted.    Urethra: Normal in appearance, no prolapse or caruncles  Bladder: no anterior tenderness, no fullness  Vulva: grossly unremarkable, no lesions or masses noted and no erythema or discharge  Vagina: normal size & caliber and no lesions   Cervix: Normal in appearance, no lesions or masses and cervix closed, streak of dark red blood and some old brownish discharge. No abnormal exudate.  Uterus: firm, non-tender, normal size, normal shape, mobile  Adnexa: unremarkable, non-tender, no fullness noted and no masses noted  Rectum: normal in appearance, no lesions    Assessment:  Benita Hernandez is a 25 year old   female with pregnancy of unknown location and abnormally rising bHCG. Last rise was larger and was just over 35%. VSS, benign abdominal examination. Normal pelvic US on . Has declined treatment with methotrexate.  Rh positive    Plan:  -Counseling provided on possible abnormal intrauterine pregnancy versus ectopic pregnancy. We reviewed the risks of ectopic pregnancy at length including risks of intra-abdominal hemorrhage and need for surgical intervention and loss of adnexal structures.  -Discussed use of methotrexate to treat early ectopic pregnancies and factors that make this treatment less likely to work including higher hCG, large mass, presence of cardiac activity, etc.  -We reviewed the risks of treatment and benefit of avoidance of need for surgical management of an ectopic pregnancy  -We discussed that my recommendation would be to proceed with treatment as we know this is an abnormal pregnancy as based on the initial hCG trending  -We reviewed alternative options to methotrexate being: uterine aspiration to rule out abnormal intrauterine pregnancy followed by methotrexate if no villi are seen - OR- continued conservative management with repeat bHCG and pelvic US once it reaches the discriminatory zone to identify where the pregnancy is located  -After counseling patient desires to:   -Repeat pelvic ultrasound tomorrow   -Repeat bHCG tomorrow at 48 hours   -Will obtain CBC, CMP, and weight/height to prepare for use of methotrexate. She is okay with using this treatment but does not want to proceed with this yet. She has declined this previously and understands the risks of an enlarging ectopic. She will consider this so that she can make a decision tomorrow after the lab results. She is getting sick of blood draws for repeat bHCG    Disposition: RTC     25 minutes were spent with the patient, and greater than 50% was time spent counseling the patient regarding pregnancy of unknown location,  ectopic, MTX.    Jessica A Behrens, DO  12/30/2019

## 2023-03-10 NOTE — PROGRESS NOTES
Gastroenterology Progress Note               Author:  JANA Julian Date & Time Created: 3/9/2023 4:19 PM       Patient ID:  Name:             Mena Campos  YOB: 1950  Age:                 72 y.o.  female  MRN:               [id number]        Medical Decision Making, by Problem:  Active Hospital Problems    Diagnosis     Colitis [K52.9]     Urinary retention [R33.9]     External hemorrhoids [K64.4]     Other dysphagia [R13.19]     Alcohol withdrawal syndrome, with delirium (HCC) [F10.931]     Microcytic anemia [D50.9]     Hyponatremia [E87.1]     Hypokalemia [E87.6]     Retroperitoneal mass [R19.00]     Psoas abscess (HCC) [K68.12]     Sepsis with encephalopathy without septic shock (HCC) [A41.9, R65.20, G93.40]     Acute encephalopathy [G93.40]     Leg swelling [M79.89]     Gastroesophageal reflux disease with esophagitis [K21.00]     OAB (overactive bladder) [N32.81]     Moderate episode of recurrent major depressive disorder (HCC) [F33.1]            Presenting Chief Complaint:  psoas abscess possibly due to IBD     History of Present Illness:   Is a pleasant 72-year-old female with a past medical history including GERD, Zheng's esophagus, alcohol use, left renal mass/RP mass followed by Dr. Campos who presented to [facility] for right-sided flank pain.  She reports has been having right-sided flank pain for the past 2 months.  She was found to have a right-sided kidney mass on 1/10/2023 with subsequent biopsy on 2/6/2023.  She had outpatient office visit with Dr. Campos from oncology who saw the biopsy results and noted that it was an abscess and sent patient to the emergency department.  CT scan was obtained and found psoas abscess.  Is s/p IR drain placement 2/24/2023-cultures grew Citrobacter koseri. ID consulted and patient started on Zosyn.  The CT scan on 2/28/2023 showed increase in size of psoas abscess measuring 3.9 x 4.7 cm with pigtail drainage  catheter in place, trace blood effusions, splenomegaly, nonobstructive left renal stones, mesenteric and body wall edema.  Additionally, there was suboptimal evaluation for colonic wall thickening due to areas of bowel decompression.  There was possibly some sigmoid and additional scattered colonic wall thickening and colitis could not  be excluded.  Scattered colonic diverticulosis was seen.  Abscess noted to be multiloculated and per IR, drain was okay to be discontinued on 3/1/2023 due to minimal drainage output.  New drain was placed on 3/7/2023.  Patient reports since being admitted, she has had multiple episodes of diarrhea up to 5/day.  She denies any chronic or bloody diarrhea prior to admission.  On chart review, patient has history of inflammatory bowel disease.  But upon questioning, she denies any previous knowledge of inflammatory bowel disease, or any other GI issues other than Zheng's esophagus.    EGD/colonoscopy report from 6/6/2015 showed mucosal irregularities at the GE junction were obtained and negative.  She had several nonbleeding diverticula throughout her colon and diverticulosis severity appeared to be mild in severity.  Nonbleeding internal hemorrhoids were also noted.       Interval History:  3/9/2023: patient seen at bedside. AAOx4. Patient reports 5 episodes of diarrhea today, particularly after eating, tolerating p.o. intake.. Fecal calprotectin pending. Discussed/offered colonoscopy to patient to evaluate for IBD.  However patient declined and would prefer to wait for results of fecal calprotectin first.        Hospital Medications:  Current Facility-Administered Medications   Medication Dose Frequency Provider Last Rate Last Admin    piperacillin-tazobactam (Zosyn) 4.5 g in  mL IVPB  4.5 g Q8HRS Cynthia Garcia M.D.   Stopped at 03/09/23 1228    therapeutic multivitamin-minerals (THERAGRAN-M) tablet 1 Tablet  1 Tablet DAILY Ranjeet Mcallister D.O.   1 Tablet at 03/09/23 2431     GI Cocktail (hyoscyamine-lidocaine-Maalox) oral susp cup 30 mL  30 mL Q8HRS BRANDI AlejandroO.   30 mL at 03/09/23 1439    oxyCODONE immediate-release (ROXICODONE) tablet 5 mg  5 mg Q3HRS PRN BRANDI AlejandroO.   5 mg at 03/01/23 0554    Or    oxyCODONE immediate release (ROXICODONE) tablet 10 mg  10 mg Q3HRS PRN AMBAR Alejandro.O.   10 mg at 03/02/23 2143    Or    morphine 4 MG/ML injection 4 mg  4 mg Q3HRS PRN Ranjeet Mcallister D.O.        enoxaparin (Lovenox) inj 40 mg  40 mg DAILY Ranjeet Mcallister D.O.   40 mg at 03/08/23 1729    QUEtiapine (Seroquel) tablet 50 mg  50 mg Nightly Lg Gil M.D.   50 mg at 03/08/23 2118    melatonin tablet 5 mg  5 mg Nightly Lg Gil M.D.   5 mg at 03/08/23 2118    acetaminophen (Tylenol) tablet 650 mg  650 mg Q6HRS PRN Magaly Cr M.D.        Pharmacy Consult Request ...Pain Management Review 1 Each  1 Each PHARMACY TO DOSE Magaly Cr M.D.        ondansetron (ZOFRAN) syringe/vial injection 4 mg  4 mg Q4HRS PRN Magaly Cr M.D.        ondansetron (ZOFRAN ODT) dispertab 4 mg  4 mg Q4HRS PRN Magaly Cr M.D.        sertraline (Zoloft) tablet 100 mg  100 mg BID Magaly Cr M.D.   100 mg at 03/09/23 0438   Last reviewed on 2/27/2023  5:03 PM by Lg Gil M.D.       Review of Systems:  Review of Systems   Constitutional:  Negative for chills, fever and malaise/fatigue.   HENT:  Negative for congestion and sore throat.    Respiratory:  Negative for cough and shortness of breath.    Cardiovascular:  Negative for chest pain and leg swelling.   Gastrointestinal:  Positive for diarrhea. Negative for abdominal pain, blood in stool, constipation, melena, nausea and vomiting.   Genitourinary:  Negative for dysuria and flank pain.   Musculoskeletal:  Negative for falls and myalgias.   Neurological:  Negative for weakness and headaches.   Psychiatric/Behavioral:  Negative for substance abuse. The patient is not nervous/anxious and does not have insomnia.   "  All other systems reviewed and are negative.      Vital signs:  Weight/BMI: Body mass index is 17.26 kg/m².  /76   Pulse 88   Temp 37.3 °C (99.2 °F) (Temporal)   Resp 16   Ht 1.676 m (5' 6\")   Wt 48.5 kg (106 lb 14.8 oz)   SpO2 97%   Vitals:    03/08/23 1926 03/09/23 0330 03/09/23 0755 03/09/23 1607   BP: (!) 150/77 (!) 140/65 (!) 142/79 137/76   Pulse: 87 (!) 110 85 88   Resp: 18 18 17 16   Temp: 36.7 °C (98.1 °F) 36.7 °C (98 °F) 36.9 °C (98.4 °F) 37.3 °C (99.2 °F)   TempSrc: Temporal Temporal Temporal Temporal   SpO2: 97% 96% 99% 97%   Weight:       Height:         Oxygen Therapy:  Pulse Oximetry: 97 %, O2 (LPM): 0, O2 Delivery Device: None - Room Air    Intake/Output Summary (Last 24 hours) at 3/9/2023 1619  Last data filed at 3/9/2023 0613  Gross per 24 hour   Intake --   Output 4235 ml   Net -4235 ml         Physical Exam:  Physical Exam  Vitals and nursing note reviewed.   Constitutional:       General: She is awake.      Appearance: She is cachectic.   HENT:      Head: Normocephalic.      Nose: Nose normal. No congestion.      Mouth/Throat:      Mouth: Mucous membranes are moist.      Pharynx: Oropharynx is clear.   Eyes:      General: No scleral icterus.     Extraocular Movements: Extraocular movements intact.      Conjunctiva/sclera: Conjunctivae normal.   Cardiovascular:      Rate and Rhythm: Normal rate and regular rhythm.      Pulses: Normal pulses.      Heart sounds: Normal heart sounds. No murmur heard.    No gallop.   Pulmonary:      Effort: Pulmonary effort is normal. No respiratory distress.      Breath sounds: Normal breath sounds. No wheezing.   Chest:      Chest wall: No tenderness.   Abdominal:      General: Bowel sounds are normal. There is distension.      Palpations: Abdomen is soft.      Tenderness: There is no abdominal tenderness. There is no guarding.      Comments: Mildly protuberant.   Drain to RUQ with serosanguinous fluid.    Musculoskeletal:      Right lower leg: No " edema.      Left lower leg: No edema.   Skin:     General: Skin is warm and dry.      Capillary Refill: Capillary refill takes less than 2 seconds.      Coloration: Skin is not jaundiced or pale.   Neurological:      General: No focal deficit present.      Mental Status: She is alert and oriented to person, place, and time. Mental status is at baseline.   Psychiatric:         Mood and Affect: Mood normal.         Behavior: Behavior normal. Behavior is cooperative.         Thought Content: Thought content normal.         Judgment: Judgment normal.           Labs:  Recent Labs     03/07/23  0029   SODIUM 137   POTASSIUM 3.9   CHLORIDE 102   CO2 25   BUN 17   CREATININE 0.93   MAGNESIUM 2.2   PHOSPHORUS 3.4   CALCIUM 9.5     Recent Labs     03/07/23 0029   ALTSGPT 10   ASTSGOT 16   ALKPHOSPHAT 80   TBILIRUBIN 0.2   GLUCOSE 119*     Recent Labs     03/07/23 0029 03/09/23 0038   WBC 7.9 6.9   NEUTSPOLYS 72.20*  --    LYMPHOCYTES 16.10*  --    MONOCYTES 7.10  --    EOSINOPHILS 3.30  --    BASOPHILS 0.90  --    ASTSGOT 16  --    ALTSGPT 10  --    ALKPHOSPHAT 80  --    TBILIRUBIN 0.2  --      Recent Labs     03/07/23 0029 03/09/23 0038   RBC 3.63* 3.50*   HEMOGLOBIN 9.4* 9.1*   HEMATOCRIT 29.1* 29.0*   PLATELETCT 271 241     Recent Results (from the past 24 hour(s))   CBC WITHOUT DIFFERENTIAL    Collection Time: 03/09/23 12:38 AM   Result Value Ref Range    WBC 6.9 4.8 - 10.8 K/uL    RBC 3.50 (L) 4.20 - 5.40 M/uL    Hemoglobin 9.1 (L) 12.0 - 16.0 g/dL    Hematocrit 29.0 (L) 37.0 - 47.0 %    MCV 82.9 81.4 - 97.8 fL    MCH 26.0 (L) 27.0 - 33.0 pg    MCHC 31.4 (L) 33.6 - 35.0 g/dL    RDW 59.9 (H) 35.9 - 50.0 fL    Platelet Count 241 164 - 446 K/uL    MPV 12.0 9.0 - 12.9 fL       Radiology Review:  CT-DRAIN-RETROPERITONEAL   Final Result      1.  CT guided right psoas hematoma/abscess catheter drainage.   2.  The current plan is to obtain a follow-up CT scan in 5-7 days.      US-ABDOMEN LTD (SOFT TISSUE)   Final Result       Complex fluid collection within the subcutaneous fat of the patient's right lower back in the area of the prior percutaneous drain. This measures 4.8 x 2.6 x 5.8 cm in size and likely represents subcutaneous abscess.      IR-PICC LINE PLACEMENT W/ GUIDANCE > AGE 5   Final Result                  Ultrasound-guided PICC placement performed by qualified nursing staff as    above.          CT-ABDOMEN-PELVIS WITH   Final Result      1.  Marked interval decrease in size of large right psoas, posterior pararenal and subcutaneous right flank abscess. There is percutaneous pigtail drainage catheter in place. There is residual abscess in the subcutaneous tissues measuring about 3.9 x 4.7    cm.   2.  Trace pleural effusions.   3.  Splenomegaly.   4.  Nonobstructing left renal stones.   5.  Limited evaluation for bowel wall thickening due to decompression. There could be sigmoid colon wall thickening and some scattered fluid within the colon. Correlate for colitis. No bowel obstruction.   6.  Mesenteric and body wall edema.      CT-DRAIN-RETROPERITONEAL   Final Result      1.  CT guided placement of a percutaneous drainage catheter in a right psoas fluid collection.   2.  The current plan is to obtain a follow-up CT scan in 5-7 days.      US-EXTREMITY VENOUS LOWER BILAT   Final Result      OUTSIDE IMAGES-CT ABDOMEN /PELVIS   Final Result      [id number] VIEW   Final Result      Loops of bowel and colon are somewhat indistinct, possibly related to technique. Appearance appears overall somewhat similar to outside facility CT abdomen pelvis from 2/23/2023. If symptoms have changed from recent CT, CT evaluation is recommended.            MDM (Data Review):   -Records reviewed and summarized in current documentation  -I personally reviewed and interpreted the laboratory results  -I personally reviewed the radiology images    Assessment/Recommendations:  Impressions:  Psoas abscess s/p IR drain placement  Left renal/right pulm  mass followed by Dr. Campos  History of Zheng's esophagus  History of diverticulosis  History of GERD        MDM:  This is a pleasant 72-year-old female presenting with multiloculated psoas muscle abscess.  Per chart review, she has a history of IBD but she denies any knowledge of this.  Last colonoscopy in 2016 showed mild diverticulosis throughout her entire colon, EGD with mucosal irregularity that was biopsied and subsequently negative.  Given her development of multiloculated psoas muscle abscess in addition to documented history of IBD, so for possible IBD etiology of psoas muscle abscess. Fecal Calprotectin collected and pending. Discussed further evaluation of IBD with colonoscopy since fecal calprotectin may take a few days to result. Patient declines colonoscopy at this time and would like to wait for fecal calprotectin results, but states she will talk to her  about it. Risks/benefits were discussed but support for her informed decision was provided.      Recommendations:  Fecal calprotectin pending- following.  May consider colonoscopy pending fecal calprotectin results or if patient consents to proceed.         Discussed with patient, Dr. Garcia, Dr. Garcia.      Core Quality Measures   Reviewed items::  Labs, Medications and Radiology reports reviewed

## 2023-03-10 NOTE — DIETARY
Nutrition Services Brief Update: TPN discontinued.  Last noted in MAR on 3/7.    PO intake improving.    Met wtiht patient at bedside. She had eaten part of breakfast and still working on it and it was close to noon.  Patient reported she needs to eat very slowly.  She did already have two Boost drinks, 1 yogurt today and carton of milk.  This alone is 940 kcal.  Patient likely needs 9023-7919 kcal/d to meet estimated needs.  With another Boost or some of lunch and dinner she is likely meeting estimated needs.    Weight is up 4.5 kg from admit if accurate.    Of note, frequent bowel movements and diarrhea continuing.    Recommendations/Interventions/Plan:    Could consider digestive enzyme to promote absorption of nutrients.  Continue with Boost supplements  Document intake of all PO as % taken in ADL's to provide interdisciplinary communication across all shifts.   Monitor weight.  Nutrition rep will continue to see patient for ongoing meal and preferences once PO diet in place    RD following

## 2023-03-11 ENCOUNTER — APPOINTMENT (OUTPATIENT)
Dept: RADIOLOGY | Facility: MEDICAL CENTER | Age: 73
DRG: 871 | End: 2023-03-11
Attending: INTERNAL MEDICINE
Payer: MEDICARE

## 2023-03-11 LAB
ANION GAP SERPL CALC-SCNC: 11 MMOL/L (ref 7–16)
BASOPHILS # BLD AUTO: 0.7 % (ref 0–1.8)
BASOPHILS # BLD: 0.04 K/UL (ref 0–0.12)
BUN SERPL-MCNC: 24 MG/DL (ref 8–22)
CALCIUM SERPL-MCNC: 10 MG/DL (ref 8.5–10.5)
CHLORIDE SERPL-SCNC: 101 MMOL/L (ref 96–112)
CO2 SERPL-SCNC: 24 MMOL/L (ref 20–33)
CREAT SERPL-MCNC: 1.13 MG/DL (ref 0.5–1.4)
EOSINOPHIL # BLD AUTO: 0.25 K/UL (ref 0–0.51)
EOSINOPHIL NFR BLD: 4.1 % (ref 0–6.9)
ERYTHROCYTE [DISTWIDTH] IN BLOOD BY AUTOMATED COUNT: 60 FL (ref 35.9–50)
GFR SERPLBLD CREATININE-BSD FMLA CKD-EPI: 52 ML/MIN/1.73 M 2
GLUCOSE SERPL-MCNC: 109 MG/DL (ref 65–99)
HCT VFR BLD AUTO: 29.2 % (ref 37–47)
HGB BLD-MCNC: 9.3 G/DL (ref 12–16)
IMM GRANULOCYTES # BLD AUTO: 0.02 K/UL (ref 0–0.11)
IMM GRANULOCYTES NFR BLD AUTO: 0.3 % (ref 0–0.9)
LYMPHOCYTES # BLD AUTO: 1.21 K/UL (ref 1–4.8)
LYMPHOCYTES NFR BLD: 19.7 % (ref 22–41)
MCH RBC QN AUTO: 26.2 PG (ref 27–33)
MCHC RBC AUTO-ENTMCNC: 31.8 G/DL (ref 33.6–35)
MCV RBC AUTO: 82.3 FL (ref 81.4–97.8)
MONOCYTES # BLD AUTO: 0.46 K/UL (ref 0–0.85)
MONOCYTES NFR BLD AUTO: 7.5 % (ref 0–13.4)
NEUTROPHILS # BLD AUTO: 4.15 K/UL (ref 2–7.15)
NEUTROPHILS NFR BLD: 67.7 % (ref 44–72)
NRBC # BLD AUTO: 0 K/UL
NRBC BLD-RTO: 0 /100 WBC
PLATELET # BLD AUTO: 225 K/UL (ref 164–446)
PMV BLD AUTO: 12.5 FL (ref 9–12.9)
POTASSIUM SERPL-SCNC: 3.8 MMOL/L (ref 3.6–5.5)
RBC # BLD AUTO: 3.55 M/UL (ref 4.2–5.4)
SODIUM SERPL-SCNC: 136 MMOL/L (ref 135–145)
WBC # BLD AUTO: 6.1 K/UL (ref 4.8–10.8)

## 2023-03-11 PROCEDURE — 700111 HCHG RX REV CODE 636 W/ 250 OVERRIDE (IP): Performed by: INTERNAL MEDICINE

## 2023-03-11 PROCEDURE — 700105 HCHG RX REV CODE 258: Performed by: INTERNAL MEDICINE

## 2023-03-11 PROCEDURE — A9270 NON-COVERED ITEM OR SERVICE: HCPCS | Performed by: HOSPITALIST

## 2023-03-11 PROCEDURE — A9270 NON-COVERED ITEM OR SERVICE: HCPCS | Performed by: STUDENT IN AN ORGANIZED HEALTH CARE EDUCATION/TRAINING PROGRAM

## 2023-03-11 PROCEDURE — 700102 HCHG RX REV CODE 250 W/ 637 OVERRIDE(OP): Performed by: HOSPITALIST

## 2023-03-11 PROCEDURE — 80048 BASIC METABOLIC PNL TOTAL CA: CPT

## 2023-03-11 PROCEDURE — 99232 SBSQ HOSP IP/OBS MODERATE 35: CPT | Performed by: INTERNAL MEDICINE

## 2023-03-11 PROCEDURE — 700117 HCHG RX CONTRAST REV CODE 255: Performed by: INTERNAL MEDICINE

## 2023-03-11 PROCEDURE — 97163 PT EVAL HIGH COMPLEX 45 MIN: CPT

## 2023-03-11 PROCEDURE — 770004 HCHG ROOM/CARE - ONCOLOGY PRIVATE *

## 2023-03-11 PROCEDURE — A9270 NON-COVERED ITEM OR SERVICE: HCPCS | Performed by: INTERNAL MEDICINE

## 2023-03-11 PROCEDURE — 700102 HCHG RX REV CODE 250 W/ 637 OVERRIDE(OP): Performed by: STUDENT IN AN ORGANIZED HEALTH CARE EDUCATION/TRAINING PROGRAM

## 2023-03-11 PROCEDURE — 700102 HCHG RX REV CODE 250 W/ 637 OVERRIDE(OP): Performed by: INTERNAL MEDICINE

## 2023-03-11 PROCEDURE — 74177 CT ABD & PELVIS W/CONTRAST: CPT

## 2023-03-11 PROCEDURE — 85025 COMPLETE CBC W/AUTO DIFF WBC: CPT

## 2023-03-11 RX ADMIN — PIPERACILLIN AND TAZOBACTAM 4.5 G: 4; .5 INJECTION, POWDER, LYOPHILIZED, FOR SOLUTION INTRAVENOUS; PARENTERAL at 02:43

## 2023-03-11 RX ADMIN — PIPERACILLIN AND TAZOBACTAM 4.5 G: 4; .5 INJECTION, POWDER, LYOPHILIZED, FOR SOLUTION INTRAVENOUS; PARENTERAL at 08:27

## 2023-03-11 RX ADMIN — PIPERACILLIN AND TAZOBACTAM 4.5 G: 4; .5 INJECTION, POWDER, LYOPHILIZED, FOR SOLUTION INTRAVENOUS; PARENTERAL at 16:46

## 2023-03-11 RX ADMIN — LIDOCAINE HYDROCHLORIDE 30 ML: 20 SOLUTION OROPHARYNGEAL at 04:43

## 2023-03-11 RX ADMIN — IOHEXOL 100 ML: 350 INJECTION, SOLUTION INTRAVENOUS at 01:57

## 2023-03-11 RX ADMIN — AVOBENZONE, HOMOSALATE, OCTISALATE, OCTOCRYLENE, AND OXYBENZONE 1 PACKET: 29.4; 147; 49; 25.4; 58.8 LOTION TOPICAL at 13:00

## 2023-03-11 RX ADMIN — ENOXAPARIN SODIUM 40 MG: 40 INJECTION SUBCUTANEOUS at 17:54

## 2023-03-11 RX ADMIN — LIDOCAINE HYDROCHLORIDE 30 ML: 20 SOLUTION OROPHARYNGEAL at 20:04

## 2023-03-11 RX ADMIN — SERTRALINE 100 MG: 100 TABLET, FILM COATED ORAL at 17:54

## 2023-03-11 RX ADMIN — LIDOCAINE HYDROCHLORIDE 30 ML: 20 SOLUTION OROPHARYNGEAL at 13:22

## 2023-03-11 RX ADMIN — QUETIAPINE FUMARATE 50 MG: 25 TABLET ORAL at 20:04

## 2023-03-11 RX ADMIN — SERTRALINE 100 MG: 100 TABLET, FILM COATED ORAL at 04:43

## 2023-03-11 RX ADMIN — MULTIPLE VITAMINS W/ MINERALS TAB 1 TABLET: TAB at 04:43

## 2023-03-11 RX ADMIN — Medication 5 MG: at 20:04

## 2023-03-11 ASSESSMENT — GAIT ASSESSMENTS
DISTANCE (FEET): 20
ASSISTIVE DEVICE: FRONT WHEEL WALKER
GAIT LEVEL OF ASSIST: SUPERVISED
DEVIATION: INCREASED BASE OF SUPPORT

## 2023-03-11 ASSESSMENT — COGNITIVE AND FUNCTIONAL STATUS - GENERAL
SUGGESTED CMS G CODE MODIFIER MOBILITY: CJ
CLIMB 3 TO 5 STEPS WITH RAILING: A LITTLE
MOVING FROM LYING ON BACK TO SITTING ON SIDE OF FLAT BED: A LITTLE
MOBILITY SCORE: 20
MOVING TO AND FROM BED TO CHAIR: A LITTLE
WALKING IN HOSPITAL ROOM: A LITTLE

## 2023-03-11 ASSESSMENT — ENCOUNTER SYMPTOMS
CHILLS: 0
ROS GI COMMENTS: POOR APPETITE
CONSTIPATION: 0
DIARRHEA: 1
FEVER: 0
VOMITING: 0
BLOOD IN STOOL: 0
ABDOMINAL PAIN: 0
NAUSEA: 0

## 2023-03-11 NOTE — PROGRESS NOTES
[facility] Medicine Daily Progress Note    Date of Service  3/11/2023    Chief Complaint  Mena Campos is a 72 y.o. female with GERD, MDD, EtOH use DO, and Left Renal / RP mass followed by oncologist Dr. Campos admitted 2/23/2023 with Right psoas abscess    Hospital Course  No notes on file    Mena Campos is a 72 y.o. female who presented 2/23/2023 with past medical history of repeated urine infection who comes into the hospital for right-sided flank pain.  This has been occurring for the past 2 months.  She was found to have right-sided kidney mass on January 10.  She had a biopsy of this mass on February 6.  She went to visit an oncologist today Dr. Campos who saw the biopsy results and noticed it being an abscess.  He sent her to the emergency room right away.  Repeat CT scan was completed found a psoas abscess.  General surgery recommended IR drainage.  Status post IR drain placed 2/24.  Cultures grew Citrobacter koseri.  ID was consulted.  Patient was started on Zosyn.    Repeat CT on 2/28 showed increase in size of psoas abscess measuring 3.9 x 4.7 cm with pigtail drainage catheter in place, trace blood fusions, splenomegaly, nonobstructive left renal stones, mesenteric and body wall edema.  C. difficile is negative.    Discussed with interventional radiology Dr. Sandoval.  Psoas abscess was multiloculated.  Drain has minimal output and was discontinued on 3/1.  Remaining abscess is loculated and not connected to the drain, it is currently too small to place a new drain and will hopefully resolve with antibiotics.    Hospital course was also complicated by alcohol withdrawal.    PICC line was placed on 3/4 and patient was started on TPN, however she then had improved PO intake so was FL'ed.  GI consulted given concern for possible IBD contributing to recurrent abscesses and diarrhea.  Fecal calprotectin ordered and pending.  Patient did not want colonoscopy until that returns positive.  She had  another drain placed on 3/7 however fell out 3 days later.  Repeat CT AP showed decreasing abscesses and nothing drainable.      Interval Problem Update  Patient was seen and examined at bedside.  I have personally reviewed and interpreted vitals, labs, and imaging.      - RADHA drain fell out yesterday  - repeat CT AP showed decreasing abscesses and nothing drainable per IR  - will try and remove haq today have patient work with PT/OT and work on DC planning  - still having diarrhea, less amount, will start metamucil since it's worked for patient in the past     I have discussed this patient's plan of care and discharge plan at IDT rounds today with Case Management, Nursing, Nursing leadership, and other members of the IDT team.    Consultants/Specialty  general surgery, infectious disease, and interventional radiology    Code Status  Full Code    Disposition  Patient is not medically cleared for discharge.   Anticipate discharge to to home with organized home healthcare and close outpatient follow-up.  I have placed the appropriate orders for post-discharge needs.    Review of Systems  Review of Systems   Constitutional:  Negative for chills and fever.   Gastrointestinal:  Positive for diarrhea. Negative for abdominal pain, blood in stool, constipation, melena, nausea and vomiting.        Poor appetite       Physical Exam  Temp:  [36.6 °C (97.8 °F)-37.2 °C (98.9 °F)] 36.6 °C (97.9 °F)  Pulse:  [83-95] 95  Resp:  [15-18] 16  BP: (126-138)/(68-82) 126/82  SpO2:  [94 %-98 %] 94 %    Physical Exam  Vitals and nursing note reviewed. Exam conducted with a chaperone present.   Constitutional:       Appearance: She is cachectic. She is ill-appearing (Chronically).   HENT:      Head: Normocephalic and atraumatic.      Comments: Bitemporal wasting     Right Ear: External ear normal.      Left Ear: External ear normal.      Nose: Nose normal.      Mouth/Throat:      Mouth: Mucous membranes are moist.   Eyes:      General: No  scleral icterus.     Conjunctiva/sclera: Conjunctivae normal.   Cardiovascular:      Rate and Rhythm: Normal rate and regular rhythm.      Pulses: Normal pulses.      Heart sounds: Normal heart sounds. No murmur heard.    No friction rub. No gallop.   Pulmonary:      Effort: Pulmonary effort is normal. No respiratory distress.      Breath sounds: Normal breath sounds. No wheezing, rhonchi or rales.   Abdominal:      General: Abdomen is flat. Bowel sounds are normal. There is distension.      Palpations: Abdomen is soft.      Tenderness: There is no abdominal tenderness. There is no guarding or rebound.   Genitourinary:     Comments: +Ferrera, urine yellow / clear  Musculoskeletal:      Cervical back: Neck supple.      Right lower leg: No edema.      Left lower leg: No edema.   Skin:     General: Skin is warm.      Findings: Erythema (around vulva area) present.   Neurological:      Mental Status: She is alert and oriented to person, place, and time. Mental status is at baseline.      Motor: No tremor.   Psychiatric:         Attention and Perception: Attention and perception normal.         Mood and Affect: Mood normal.         Speech: Speech normal.         Behavior: Behavior normal. Behavior is cooperative.       Fluids    Intake/Output Summary (Last 24 hours) at 3/11/2023 1244  Last data filed at 3/11/2023 1100  Gross per 24 hour   Intake 876.87 ml   Output 3900 ml   Net -3023.13 ml         Laboratory  Recent Labs     03/09/23  0038 03/11/23  0003   WBC 6.9 6.1   RBC 3.50* 3.55*   HEMOGLOBIN 9.1* 9.3*   HEMATOCRIT 29.0* 29.2*   MCV 82.9 82.3   MCH 26.0* 26.2*   MCHC 31.4* 31.8*   RDW 59.9* 60.0*   PLATELETCT 241 225   MPV 12.0 12.5       Recent Labs     03/11/23  0003   SODIUM 136   POTASSIUM 3.8   CHLORIDE 101   CO2 24   GLUCOSE 109*   BUN 24*   CREATININE 1.13   CALCIUM 10.0                         Imaging  CT-ABDOMEN-PELVIS WITH   Final Result         1.  There is heterogeneous enhancement in the medial posterior  right abdominal wall involving the psoas muscle, given recent CT findings, likely represents phlegmon and inflammatory changes, there is small residual enhancing abscess in the right side is    muscle inferiorly.   2.  Pigtail catheter has been withdrawn into the subcutaneous soft tissues compared to prior study   3.  Heterogeneously enhancing collection or lesion in the lower pole of the right kidney, appears decreased in size since prior study, could represent abscess given adjacent nearby abscess or enhancing renal lesion. Recommend radiographic follow-up to    resolution.   4.  Hepatomegaly   5.  Splenomegaly   6.  Right lower lobe pulmonary nodule, see nodule follow-up recommendations below.      Fleischner Society pulmonary nodule recommendations:   Low Risk: No routine follow-up      High Risk: Optional CT at 12 months      Comments: Nodules less than 6 mm do not require routine follow-up, but certain patients at high risk with suspicious nodule morphology, upper lobe location, or both may warrant 12-month follow-up.      Low Risk - Minimal or absent history of smoking and of other known risk factors.      High Risk - History of smoking or of other known risk factors.      Note: These recommendations do not apply to lung cancer screening, patients with immunosuppression, or patients with known primary cancer.      Fleischner Society 2017 Guidelines for Management of Incidentally Detected Pulmonary Nodules in Adults         CT-DRAIN-RETROPERITONEAL   Final Result      1.  CT guided right psoas hematoma/abscess catheter drainage.   2.  The current plan is to obtain a follow-up CT scan in 5-7 days.      US-ABDOMEN LTD (SOFT TISSUE)   Final Result      Complex fluid collection within the subcutaneous fat of the patient's right lower back in the area of the prior percutaneous drain. This measures 4.8 x 2.6 x 5.8 cm in size and likely represents subcutaneous abscess.      IR-PICC LINE PLACEMENT W/ GUIDANCE > AGE 5    Final Result                  Ultrasound-guided PICC placement performed by qualified nursing staff as    above.          CT-ABDOMEN-PELVIS WITH   Final Result      1.  Marked interval decrease in size of large right psoas, posterior pararenal and subcutaneous right flank abscess. There is percutaneous pigtail drainage catheter in place. There is residual abscess in the subcutaneous tissues measuring about 3.9 x 4.7    cm.   2.  Trace pleural effusions.   3.  Splenomegaly.   4.  Nonobstructing left renal stones.   5.  Limited evaluation for bowel wall thickening due to decompression. There could be sigmoid colon wall thickening and some scattered fluid within the colon. Correlate for colitis. No bowel obstruction.   6.  Mesenteric and body wall edema.      CT-DRAIN-RETROPERITONEAL   Final Result      1.  CT guided placement of a percutaneous drainage catheter in a right psoas fluid collection.   2.  The current plan is to obtain a follow-up CT scan in 5-7 days.      US-EXTREMITY VENOUS LOWER BILAT   Final Result      OUTSIDE IMAGES-CT ABDOMEN /PELVIS   Final Result      [id number] VIEW   Final Result      Loops of bowel and colon are somewhat indistinct, possibly related to technique. Appearance appears overall somewhat similar to outside facility CT abdomen pelvis from 2/23/2023. If symptoms have changed from recent CT, CT evaluation is recommended.      IR-CONSULT AND TREAT    (Results Pending)          Assessment/Plan  * Psoas abscess (HCC)- (present on admission)  Assessment & Plan  Presented to [facility] with Right flank pain  CT demonstrated psoas abscess prompting transfer to [facility] for IR  General surgery consulted and s/o, recommended IR-guided drainage  IR-guided drainage 2/24/23, Repeat CT 2/28/23  - requested RN flushes BID and record output  Abscess culture +Citrobacter koseri, pan-susceptible  Continue zosyn for now  ID consulted for antibiotic planning after source control  HIV negative    Drain  discontinued 3/1  Repeat drain replaced 3/7  Drain fell out 3/10    Repeat CT AP reviewed: showed decreasing abscesses, nothing drainable per IR  Will plan for another 2 weeks of abx then follow up CT AP with ID since no PCP currently     Colitis  Assessment & Plan  Patient with persistent abdominal distention, pain, not tolerating PO   On TPN  -Psoas abscess  - given need for TPN, persistent abd distention, diarrhea, and possible h/o IBD? (patient denies, says her diarrhea was fixed with metamucil), GI consulted, checked fecal calprotectin, recommended colonoscopy however patient wants to wait until lab results first    - tolerating PO hwoever still having loose stools, off TPN  - add metamucil 3/11    Urinary retention  Assessment & Plan  Likely due to oxybutynin - discontinued  No UA from prior to haq placement  Zosyn expected to cover most urine pathogens - culture deferred  Trial haq discontinuation prior to discharge, if unsuccessful will refer to urology  Hold off on voiding trial for now as patient still having significant diarrhea and vulva dermatitis from moisture     Remove haq today, TOV     External hemorrhoids  Assessment & Plan  Bowel protocol - senna QHS PRN, miralax PRN    Other dysphagia- (present on admission)  Assessment & Plan  Resolved  SLP evaluation  Modified barium swallow deferred per SLP recommendation    Retroperitoneal mass- (present on admission)  Assessment & Plan  Underwent biopsy of Left renal pole mass extending into RP, it was not indicative of malignancy  Follow up with Oncologist Dr. Campos for further diagnostic evaluation after discharge    Hypokalemia- (present on admission)  Assessment & Plan  Resolved  On TPN  CTM    Hyponatremia- (present on admission)  Assessment & Plan  Resolved  On TPN  CTM    Microcytic anemia- (present on admission)  Assessment & Plan  Follows with Heme-Onc Dr. Campos  Elevated ferritin indicative of AOCD  Transfuse for Hgb <7    Alcohol withdrawal  syndrome, with delirium (HCC)- (present on admission)  Assessment & Plan  Resolved  Reports >2 beers daily, most recent 2/22/23  Developed diaphoresis, delirium, tremor, tachycardia on HD#2  CIWA-Lorazepam protocol  Empiric MVN + B12 + Folate + Thiamine  Will discuss MAT / AA prior to discharge    Leg swelling- (present on admission)  Assessment & Plan  BLE US negative for DVT  TTE deferred    Acute encephalopathy- (present on admission)  Assessment & Plan  Resolved  Likely multifactorial including underlying abscess and EtOH withdrawal - see separate plans  Nonfocal, CTH deferred    Sepsis with encephalopathy without septic shock (HCC)- (present on admission)  Assessment & Plan  This is Sepsis Present on admission  SIRS criteria identified on my evaluation include: Fever, with temperature greater than 101 deg F, Tachypnea, with respirations greater than 20 per minute and Leukocytosis, with WBC greater than 12,000  Source is right psoas abscess  Sepsis protocol initiated  Fluid resuscitation ordered per protocol  Crystalloid Fluid Administration: Fluid resuscitation ordered per standard protocol - 30 mL/kg per current or ideal body weight  IV antibiotics as appropriate for source of sepsis  Reassessment: I have reassessed the patient's hemodynamic status    BCx 2/23: NGTD  Abscess Cx 2/24: +Citrobacter koseri  Continue zosyn for intra-abdominal abscess      Gastroesophageal reflux disease with esophagitis- (present on admission)  Assessment & Plan  Likely exacerbated by EtOH use DO  Continue omeprazole    OAB (overactive bladder)- (present on admission)  Assessment & Plan  Discontinued oxybutynin due to urinary retention    Moderate episode of recurrent major depressive disorder (HCC)- (present on admission)  Assessment & Plan  Continue sertraline       VTE prophylaxis: SCDs/TEDs and enoxaparin ppx    I have performed a physical exam and reviewed and updated ROS and Plan today (3/11/2023). In review of yesterday's  note (3/10/2023), there are no changes except as documented above.

## 2023-03-11 NOTE — CARE PLAN
The patient is Stable - Low risk of patient condition declining or worsening    Shift Goals  Clinical Goals: IR to fix RADHA  Patient Goals: move better  Family Goals: ANTHONY    Progress made toward(s) clinical / shift goals:  Ferrera catheter removed. Patient voided. RADHA drain removed. Site CDI.     Patient is not progressing towards the following goals:
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
## 2023-03-11 NOTE — CARE PLAN
The patient is Stable - Low risk of patient condition declining or worsening    Shift Goals  Clinical Goals: Get CT done  Patient Goals: Rest  Family Goals: feel better    Patient was updated on plan for CT at midnight.     Progress made toward(s) clinical / shift goals:      Problem: Knowledge Deficit - Standard  Goal: Patient and family/care givers will demonstrate understanding of plan of care, disease process/condition, diagnostic tests and medications  Outcome: Progressing       Patient is not progressing towards the following goals:

## 2023-03-12 ENCOUNTER — PHARMACY VISIT (OUTPATIENT)
Dept: PHARMACY | Facility: MEDICAL CENTER | Age: 73
End: 2023-03-12
Payer: COMMERCIAL

## 2023-03-12 VITALS
SYSTOLIC BLOOD PRESSURE: 141 MMHG | WEIGHT: 106.92 LBS | BODY MASS INDEX: 17.18 KG/M2 | DIASTOLIC BLOOD PRESSURE: 81 MMHG | HEART RATE: 79 BPM | RESPIRATION RATE: 16 BRPM | TEMPERATURE: 98.1 F | OXYGEN SATURATION: 98 % | HEIGHT: 66 IN

## 2023-03-12 PROBLEM — A41.9 SEPSIS WITH ENCEPHALOPATHY WITHOUT SEPTIC SHOCK (HCC): Status: RESOLVED | Noted: 2023-02-23 | Resolved: 2023-03-12

## 2023-03-12 PROBLEM — R13.19 OTHER DYSPHAGIA: Status: RESOLVED | Noted: 2023-02-25 | Resolved: 2023-03-12

## 2023-03-12 PROBLEM — R33.9 URINARY RETENTION: Status: RESOLVED | Noted: 2023-02-27 | Resolved: 2023-03-12

## 2023-03-12 PROBLEM — M79.89 LEG SWELLING: Status: RESOLVED | Noted: 2023-02-23 | Resolved: 2023-03-12

## 2023-03-12 PROBLEM — G93.40 ACUTE ENCEPHALOPATHY: Status: RESOLVED | Noted: 2023-02-23 | Resolved: 2023-03-12

## 2023-03-12 PROBLEM — G93.41 SEPSIS WITH ENCEPHALOPATHY WITHOUT SEPTIC SHOCK (HCC): Status: RESOLVED | Noted: 2023-02-23 | Resolved: 2023-03-12

## 2023-03-12 PROBLEM — F10.931 ALCOHOL WITHDRAWAL SYNDROME, WITH DELIRIUM (HCC): Status: RESOLVED | Noted: 2023-02-24 | Resolved: 2023-03-12

## 2023-03-12 PROBLEM — E87.1 HYPONATREMIA: Status: RESOLVED | Noted: 2023-02-24 | Resolved: 2023-03-12

## 2023-03-12 PROBLEM — R65.20 SEPSIS WITH ENCEPHALOPATHY WITHOUT SEPTIC SHOCK (HCC): Status: RESOLVED | Noted: 2023-02-23 | Resolved: 2023-03-12

## 2023-03-12 LAB
ANION GAP SERPL CALC-SCNC: 11 MMOL/L (ref 7–16)
BUN SERPL-MCNC: 22 MG/DL (ref 8–22)
CALCIUM SERPL-MCNC: 10.2 MG/DL (ref 8.5–10.5)
CALPROTECTIN STL-MCNT: 20 UG/G
CHLORIDE SERPL-SCNC: 102 MMOL/L (ref 96–112)
CO2 SERPL-SCNC: 23 MMOL/L (ref 20–33)
CREAT SERPL-MCNC: 1.07 MG/DL (ref 0.5–1.4)
GFR SERPLBLD CREATININE-BSD FMLA CKD-EPI: 55 ML/MIN/1.73 M 2
GLUCOSE SERPL-MCNC: 123 MG/DL (ref 65–99)
MAGNESIUM SERPL-MCNC: 1.9 MG/DL (ref 1.5–2.5)
POTASSIUM SERPL-SCNC: 3.5 MMOL/L (ref 3.6–5.5)
SODIUM SERPL-SCNC: 136 MMOL/L (ref 135–145)

## 2023-03-12 PROCEDURE — A9270 NON-COVERED ITEM OR SERVICE: HCPCS | Performed by: HOSPITALIST

## 2023-03-12 PROCEDURE — 36415 COLL VENOUS BLD VENIPUNCTURE: CPT

## 2023-03-12 PROCEDURE — 80048 BASIC METABOLIC PNL TOTAL CA: CPT

## 2023-03-12 PROCEDURE — 700102 HCHG RX REV CODE 250 W/ 637 OVERRIDE(OP): Performed by: HOSPITALIST

## 2023-03-12 PROCEDURE — 700102 HCHG RX REV CODE 250 W/ 637 OVERRIDE(OP): Performed by: INTERNAL MEDICINE

## 2023-03-12 PROCEDURE — A9270 NON-COVERED ITEM OR SERVICE: HCPCS | Performed by: INTERNAL MEDICINE

## 2023-03-12 PROCEDURE — 700105 HCHG RX REV CODE 258: Performed by: INTERNAL MEDICINE

## 2023-03-12 PROCEDURE — 700111 HCHG RX REV CODE 636 W/ 250 OVERRIDE (IP): Performed by: INTERNAL MEDICINE

## 2023-03-12 PROCEDURE — 99239 HOSP IP/OBS DSCHRG MGMT >30: CPT | Performed by: INTERNAL MEDICINE

## 2023-03-12 PROCEDURE — 83735 ASSAY OF MAGNESIUM: CPT

## 2023-03-12 PROCEDURE — RXMED WILLOW AMBULATORY MEDICATION CHARGE: Performed by: INTERNAL MEDICINE

## 2023-03-12 RX ORDER — POTASSIUM CHLORIDE 20 MEQ/1
40 TABLET, EXTENDED RELEASE ORAL ONCE
Status: COMPLETED | OUTPATIENT
Start: 2023-03-12 | End: 2023-03-12

## 2023-03-12 RX ORDER — AMOXICILLIN AND CLAVULANATE POTASSIUM 875; 125 MG/1; MG/1
1 TABLET, FILM COATED ORAL 2 TIMES DAILY
Qty: 26 TABLET | Refills: 0 | Status: ACTIVE | OUTPATIENT
Start: 2023-03-12 | End: 2023-03-22 | Stop reason: SDUPTHER

## 2023-03-12 RX ADMIN — SERTRALINE 100 MG: 100 TABLET, FILM COATED ORAL at 05:22

## 2023-03-12 RX ADMIN — AVOBENZONE, HOMOSALATE, OCTISALATE, OCTOCRYLENE, AND OXYBENZONE 1 PACKET: 29.4; 147; 49; 25.4; 58.8 LOTION TOPICAL at 05:23

## 2023-03-12 RX ADMIN — LIDOCAINE HYDROCHLORIDE 30 ML: 20 SOLUTION OROPHARYNGEAL at 05:22

## 2023-03-12 RX ADMIN — PIPERACILLIN AND TAZOBACTAM 4.5 G: 4; .5 INJECTION, POWDER, LYOPHILIZED, FOR SOLUTION INTRAVENOUS; PARENTERAL at 08:29

## 2023-03-12 RX ADMIN — POTASSIUM CHLORIDE 40 MEQ: 1500 TABLET, EXTENDED RELEASE ORAL at 08:29

## 2023-03-12 RX ADMIN — MULTIPLE VITAMINS W/ MINERALS TAB 1 TABLET: TAB at 05:22

## 2023-03-12 RX ADMIN — PIPERACILLIN AND TAZOBACTAM 4.5 G: 4; .5 INJECTION, POWDER, LYOPHILIZED, FOR SOLUTION INTRAVENOUS; PARENTERAL at 00:24

## 2023-03-12 ASSESSMENT — PAIN DESCRIPTION - PAIN TYPE: TYPE: ACUTE PAIN

## 2023-03-12 NOTE — CARE PLAN
Problem: Knowledge Deficit - Standard  Goal: Patient and family/care givers will demonstrate understanding of plan of care, disease process/condition, diagnostic tests and medications  Outcome: Progressing     Problem: Pain - Standard  Goal: Alleviation of pain or a reduction in pain to the patient’s comfort goal  Outcome: Progressing     Problem: Fall Risk  Goal: Patient will remain free from falls  Outcome: Progressing     Problem: Psychosocial  Goal: Patient's level of anxiety will decrease  Outcome: Progressing     The patient is Stable - Low risk of patient condition declining or worsening    Shift Goals  Clinical Goals: comfort  Patient Goals: rest  Family Goals: ANTHONY    Progress made toward(s) clinical / shift goals:  Patient able to rest and sleep well overnight, denied pain. Pt safe and free from falls.

## 2023-03-12 NOTE — DISCHARGE SUMMARY
Discharge Summary    CHIEF COMPLAINT ON ADMISSION  No chief complaint on file.      Reason for Admission  Renal mass and abscess     Admission Date  2/23/2023    CODE STATUS  Full Code    HPI & HOSPITAL COURSE    Mena Campos is a 72 y.o. female who presented 2/23/2023 with past medical history of repeated urine infection who comes into the hospital for right-sided flank pain.  This has been occurring for the past 2 months.  She was found to have right-sided kidney mass on January 10.  She had a biopsy of this mass on February 6.  She went to visit an oncologist today Dr. Campos who saw the biopsy results and noticed it being an abscess.  He sent her to the emergency room right away.  Repeat CT scan was completed found a psoas abscess.  General surgery recommended IR drainage.  Status post IR drain placed 2/24.  Cultures grew Citrobacter koseri.  ID was consulted.  Patient was started on Zosyn.     Repeat CT on 2/28 showed increase in size of psoas abscess measuring 3.9 x 4.7 cm with pigtail drainage catheter in place, trace blood fusions, splenomegaly, nonobstructive left renal stones, mesenteric and body wall edema.  C. difficile is negative.     Discussed with interventional radiology Dr. Sandoval.  Psoas abscess was multiloculated.  Drain has minimal output and was discontinued on 3/1.  Remaining abscess is loculated and not connected to the drain, it is currently too small to place a new drain and will hopefully resolve with antibiotics.  Repeat CT Drain replaced on 3/7 however then accidentally removed 3/10, repeat CT AP showed decreasing abscesses and per IR nothing drainable.  ID recommended 2 more weeks of antibiotics with repeat CT abdomen pelvis and follow-up with infectious disease.     Hospital course was also complicated by urinary retention requiring Ferrera (removed day prior to discharge), alcohol withdrawal, poor p.o. intake, weight loss, abdominal distention and diarrhea.  PICC line was placed  on 3/4 and patient was started on TPN, however she then had improved PO intake so was FL'ed.  GI consulted given concern for possible IBD contributing to recurrent abscesses and diarrhea.  Fecal calprotectin ordered and pending.  Patient did not want colonoscopy until that returns positive.       Therefore, she is discharged in good and stable condition to home with close outpatient follow-up.    The patient met 2-midnight criteria for an inpatient stay at the time of discharge.    Discharge Date  03/12/23      FOLLOW UP ITEMS POST DISCHARGE  Repeat CT scan in 2 weeks, follow-up with infectious disease  GI referral for possible colonoscopy  Monitor weight, severe malnutrition in setting of weight loss from infections  Monitor hemoglobin outpatient  Lung nodule on CT, consider repeat in 12 months    DISCHARGE DIAGNOSES  Principal Problem:    Psoas abscess (HCC) POA: Yes  Active Problems:    Moderate episode of recurrent major depressive disorder (HCC) POA: Yes    OAB (overactive bladder) POA: Yes    Gastroesophageal reflux disease with esophagitis POA: Yes    Microcytic anemia POA: Yes    Hypokalemia POA: Yes    Retroperitoneal mass POA: Yes    External hemorrhoids POA: Clinically Undetermined    Colitis POA: Unknown  Resolved Problems:    Sepsis with encephalopathy without septic shock (HCC) POA: Yes    Acute encephalopathy POA: Yes    Leg swelling POA: Yes    Alcohol withdrawal syndrome, with delirium (HCC) POA: Yes    Hyponatremia POA: Yes    Other dysphagia POA: Yes    Urinary retention POA: No      FOLLOW UP  No future appointments.  Leela Pop M.D.  [address] [place], [place] [address]  [facility] [id number]  [phone]    Schedule an appointment as soon as possible for a visit in 2 week(s)        MEDICATIONS ON DISCHARGE     Medication List        START taking these medications        Instructions   amoxicillin-clavulanate 875-125 MG Tabs  Commonly known as: AUGMENTIN   Take 1 Tablet by mouth 2 times a day for 13  days.  Dose: 1 Tablet            CONTINUE taking these medications        Instructions   ascorbic acid 500 MG Tabs  Commonly known as: ascorbic acid   Take 500 mg by mouth 3 times a day.  Dose: 500 mg     B Complex 50 Tabs   Take 1 tablet by mouth 2 times a day.  Dose: 1 Tablet     CALCIUM-PHOSPHORUS PO   Take 500 mg by mouth 3 times a day with meals. Indications: Abhay labs  Dose: 500 mg     famotidine 20 MG Tabs  Commonly known as: PEPCID   TAKE 1 TABLET BY MOUTH 3 TIMES A DAY.     FIBER PO   Take  by mouth.     Fish Oil 1200 MG Caps   Take 2 Capsules by mouth every day.  Dose: 2 Capsule     METAMUCIL FIBER PO   Take  by mouth.     MULTIVITAMIN PO      oxyCODONE-acetaminophen  MG Tabs  Commonly known as: PERCOCET-10   Doctor's comments: 30 day supply  Take 1 Tablet by mouth every 6 hours as needed for Severe Pain for up to 30 days.  Dose: 1 Tablet     Potassium 99 MG Tabs   Take 2 Tablets by mouth every day.  Dose: 2 Tablet     sertraline 100 MG Tabs  Commonly known as: Zoloft   Doctor's comments: Pt needs to schedule annual for future refills.  Take 1 Tablet by mouth 2 times a day.  Dose: 100 mg     tolterodine ER 4 MG Cp24  Commonly known as: DETROL LA   Doctor's comments: Pt needs to schedule annual for future refills.  Take 1 Capsule by mouth 2 times a day.  Dose: 4 mg     Vitamin D3 50 MCG (2000 UT) Tabs   Take 4,000 Units by mouth every day.  Dose: 4,000 Units     vitamin e 400 UNIT Caps  Commonly known as: VITAMIN E   Take 400 Units by mouth every day.  Dose: 400 Units     Zinc 50 MG Caps   Take 50 mg by mouth every day.  Dose: 50 mg            STOP taking these medications      nitrofurantoin 100 MG Caps  Commonly known as: Macrobid              Allergies  Allergies   Allergen Reactions    Seasonal     Wellbutrin [Bupropion Hcl]      [facility]       DIET  Orders Placed This Encounter   Procedures    Diet Order Diet: Low Fiber(GI Soft)     Standing Status:   Standing     Number of Occurrences:    1     Order Specific Question:   Diet:     Answer:   Low Fiber(GI Soft) [2]    Discontinue Diet Tray     Standing Status:   Standing     Number of Occurrences:   1       ACTIVITY  As tolerated.  Weight bearing as tolerated    CONSULTATIONS  Surgery  ID  GI    PROCEDURES  2/24/2023  CT-guided drain placement    3/7/2023  CT-guided drain placement    Discharge exam  Physical Exam  Constitutional:       General: She is not in acute distress.     Appearance: She is cachectic. She is not toxic-appearing.   HENT:      Head: Normocephalic and atraumatic.      Nose: Nose normal.      Mouth/Throat:      Mouth: Mucous membranes are moist.   Eyes:      General: No scleral icterus.     Conjunctiva/sclera: Conjunctivae normal.   Cardiovascular:      Rate and Rhythm: Normal rate and regular rhythm.      Heart sounds: No murmur heard.    No friction rub. No gallop.   Pulmonary:      Effort: Pulmonary effort is normal.      Breath sounds: Normal breath sounds.   Abdominal:      General: Bowel sounds are normal. There is distension.      Palpations: Abdomen is soft.      Tenderness: There is no abdominal tenderness. There is no guarding or rebound.   Musculoskeletal:      Cervical back: Normal range of motion.      Right lower leg: No edema.      Left lower leg: No edema.   Skin:     Coloration: Skin is not jaundiced.      Findings: No rash.   Neurological:      Mental Status: She is alert and oriented to person, place, and time. Mental status is at baseline.      Motor: No weakness.      Gait: Gait normal.   Psychiatric:         Mood and Affect: Mood normal.         Behavior: Behavior normal.         LABORATORY  Lab Results   Component Value Date    SODIUM 136 03/12/2023    POTASSIUM 3.5 (L) 03/12/2023    CHLORIDE 102 03/12/2023    CO2 23 03/12/2023    GLUCOSE 123 (H) 03/12/2023    BUN 22 03/12/2023    CREATININE 1.07 03/12/2023        Lab Results   Component Value Date    WBC 6.1 03/11/2023    HEMOGLOBIN 9.3 (L) 03/11/2023     HEMATOCRIT 29.2 (L) 03/11/2023    PLATELETCT 225 03/11/2023      CT-ABDOMEN-PELVIS WITH  Narrative:   3/11/2023 1:46 AM    HISTORY/REASON FOR EXAM:  recurrent abscesses, monitor progress of drains and IV ABX.    TECHNIQUE/EXAM DESCRIPTION:   CT scan of the abdomen and pelvis with contrast.    Contrast-enhanced helical scanning was obtained from the diaphragmatic domes through the pubic symphysis following the bolus administration of nonionic contrast without complication.    100 mL of Omnipaque 350 nonionic contrast was administered without complication.    Low dose optimization technique was utilized for this CT exam including automated exposure control and adjustment of the mA and/or kV according to patient size.    COMPARISON: March 7, 2023    FINDINGS:    Lower Chest: 5 mm right lower lobe pulmonary nodule is seen on image 1. Linear densities in bilateral lung bases favor changes of atelectasis.    Liver: Hepatomegaly is seen.    Spleen: Splenomegaly is noted.    Pancreas: Unremarkable.    Gallbladder: No calcified stones.    Biliary: Nondilated.    Adrenal glands: Normal.    Kidneys: Scattered low-density cystic lesions are seen within the bilateral kidneys. 2.5 cm heterogeneously enhancing lesion in the lower pole of the right kidney is seen.    Bowel: No obstruction or acute inflammation. The appendix is not definitively identified.    Lymph nodes: No adenopathy.    Vasculature: Unremarkable.    Peritoneum: Unremarkable without ascites.    Musculoskeletal: Heterogeneous area of enhancement is seen involving the right psoas muscle tracking along the medial margin of the liver with pigtail catheter in the subcutaneous soft tissues of the right posterior flank. There is 8mm low-density fluid   collection in the right side is muscle.    Pelvis: No adenopathy or free fluid. Ferrera catheter is seen within the bladder.  Impression: 1.  There is heterogeneous enhancement in the medial posterior right abdominal wall  involving the psoas muscle, given recent CT findings, likely represents phlegmon and inflammatory changes, there is small residual enhancing abscess in the right side is   muscle inferiorly.  2.  Pigtail catheter has been withdrawn into the subcutaneous soft tissues compared to prior study  3.  Heterogeneously enhancing collection or lesion in the lower pole of the right kidney, appears decreased in size since prior study, could represent abscess given adjacent nearby abscess or enhancing renal lesion. Recommend radiographic follow-up to   resolution.  4.  Hepatomegaly  5.  Splenomegaly  6.  Right lower lobe pulmonary nodule, see nodule follow-up recommendations below.    Fleischner Society pulmonary nodule recommendations:  Low Risk: No routine follow-up    High Risk: Optional CT at 12 months    Comments: Nodules less than 6 mm do not require routine follow-up, but certain patients at high risk with suspicious nodule morphology, upper lobe location, or both may warrant 12-month follow-up.    Low Risk - Minimal or absent history of smoking and of other known risk factors.    High Risk - History of smoking or of other known risk factors.    Note: These recommendations do not apply to lung cancer screening, patients with immunosuppression, or patients with known primary cancer.    Fleischner Society 2017 Guidelines for Management of Incidentally Detected Pulmonary Nodules in Adults        Total time of the discharge process exceeds 35 minutes.

## 2023-03-12 NOTE — DISCHARGE INSTRUCTIONS
Discharge Instructions per Cynthia Garcia M.D.  Mrs. Campos,  You were admitted to [facility] with an abdominal abscess requiring drainage (twice).  You were started on IV antibiotics and will continue on oral antibiotics (Augmentin twice daily) for another 2 weeks.  You will also need a repeat CT scan of your abdomen in 2 weeks.  You will follow-up with infectious disease in 2 weeks as well.  I have also placed a referral for the gastroenterologist for possible outpatient colonoscopy.  Return to ER if you develop fevers, worsening abdominal pain, nausea/vomiting, worsening diarrhea or any other concerning symptoms.    Diet  Low Fiber Diet  A Low Fiber Diet consists of eating  less than 10 grams of fiber a day. Always check food labels to see the dietary fiber content of packaged foods. In general, a low-fiber food will have fewer than 2 grams of fiber per serving.  Try to avoid whole grains, raw fruits and vegetables, dried fruit, tough cuts of meat, nuts, and seeds.      Activity  Resume Your Normal Activity  You may resume your normal activity as tolerated.  Rest as needed.

## 2023-03-12 NOTE — DISCHARGE PLANNING
Care Transition Team Final Discharge Disposition    Actual Discharge Information  D/c order written.  PT/OT recommending HHC. Patient declining at this time.  If patent changed her mind she can request HHC from PCP.  IMM issued.  No other needs.  Discharge Disposition: Discharged to home/self care (01)

## 2023-03-12 NOTE — CARE PLAN
The patient is Watcher - Medium risk of patient condition declining or worsening    Shift Goals  Clinical Goals: discharge, abx, monitor vitals, safety  Patient Goals: rest, go home  Family Goals: ANTHONY    Progress made toward(s) clinical / shift goals:    Problem: Knowledge Deficit - Standard  Goal: Patient and family/care givers will demonstrate understanding of plan of care, disease process/condition, diagnostic tests and medications  Outcome: Progressing   Patient A&Ox4. Patient updated on plan of care. Patient eager to discharge.   Problem: Pain - Standard  Goal: Alleviation of pain or a reduction in pain to the patient’s comfort goal  Outcome: Progressing   Patient denies pain.   Problem: Fall Risk  Goal: Patient will remain free from falls  Outcome: Progressing   Patient educated on fall risk. Patient refusing bed alarm, calling appropriately for assistance. Frequent toileting provided. Call light and belongings within reach. Hourly rounding in place.   Problem: Skin Integrity  Goal: Skin integrity is maintained or improved  Outcome: Progressing   Patient with waffle overlay in place. Pillows in use for support and positioning. Dressing CDI to old drain site.     Patient is not progressing towards the following goals:

## 2023-03-15 ENCOUNTER — TELEPHONE (OUTPATIENT)
Dept: INFECTIOUS DISEASES | Facility: MEDICAL CENTER | Age: 73
End: 2023-03-15
Payer: MEDICARE

## 2023-03-15 NOTE — TELEPHONE ENCOUNTER
Patient called scheduled fv per notes should have repeat CT in 2 weeks. I faxed orders to [facility] patient has [employer] adv for insurance and this is not in network at [facility]. I advised patient to schedule appt for CT and call me back to let me know when this date is so that I can reach out to NANI Pisano and ask if we will ext abx and we may move appt as well

## 2023-03-22 ENCOUNTER — TELEPHONE (OUTPATIENT)
Dept: INFECTIOUS DISEASES | Facility: MEDICAL CENTER | Age: 73
End: 2023-03-22
Payer: MEDICARE

## 2023-03-22 DIAGNOSIS — K52.9 COLITIS: ICD-10-CM

## 2023-03-22 DIAGNOSIS — K68.12 PSOAS ABSCESS (HCC): ICD-10-CM

## 2023-03-22 RX ORDER — AMOXICILLIN AND CLAVULANATE POTASSIUM 875; 125 MG/1; MG/1
1 TABLET, FILM COATED ORAL 2 TIMES DAILY
Qty: 28 TABLET | Refills: 0 | Status: SHIPPED | OUTPATIENT
Start: 2023-03-22 | End: 2023-04-05 | Stop reason: SDUPTHER

## 2023-03-22 NOTE — PROGRESS NOTES
Patient has not had follow-up CT scan, will extend p.o. Augmentin 875/125 mg twice daily for an additional 14 days until repeat CT scan shows resolution of abscess.  [facility]

## 2023-03-22 NOTE — TELEPHONE ENCOUNTER
Spoke with patients  and relayed message. I cancel appt this Friday they will give me a call as soon as the CT is scheduled to get her back into our schedule

## 2023-03-22 NOTE — TELEPHONE ENCOUNTER
----- Message from JANA Hawk sent at 3/22/2023  3:39 PM PDT -----  Augmentin extended an additional 14 days, please schedule her for follow-up after her CT scan. Thanks     ----- Message -----  From: Risa Pimentel, Med Ass't  Sent: 3/22/2023  12:57 PM PDT  To: JANA Hawk    Patient is scheduled to see you Friday at 5pm however she has not scheduled the CT scan, she has appt today to see her PCP so they can place orders for CT. I did let her know that Dr Sigala had placed orders and sent these to [facility] since her insurance is out of network. Patient did not schedule and will wait for her PCP to schedule the Imaging at [facility]. Would you ext abx? Her visit with us is out of network as well and patient would be cash pay if we see her.

## 2023-03-27 ENCOUNTER — APPOINTMENT (OUTPATIENT)
Dept: RADIOLOGY | Facility: MEDICAL CENTER | Age: 73
End: 2023-03-27
Attending: INTERNAL MEDICINE
Payer: MEDICARE

## 2023-03-30 ENCOUNTER — TELEPHONE (OUTPATIENT)
Dept: INFECTIOUS DISEASES | Facility: MEDICAL CENTER | Age: 73
End: 2023-03-30
Payer: MEDICARE

## 2023-03-30 NOTE — TELEPHONE ENCOUNTER
Patient scheduled for CT this Saturday. Offered follow up appt Tuesday 4/4/23 She does not wish to schedule out of net work. Will have APRN look at results if further abx would be needed pt will discuss with PCP.

## 2023-04-04 NOTE — TELEPHONE ENCOUNTER
Repeat CT scanned in media. Patient does not wish to come in to clinic for office follow up insurance is out of network. Please review repeat CT scan ABX were extended until tomorrow pending review. Thank you

## 2023-04-04 NOTE — TELEPHONE ENCOUNTER
Jerzy Vu M.D.  You 47 minutes ago (8:30 AM)       It has improved but still fairly large and will need continued follow-up, likely change of antibiotics and longer duration and repeat CT scan again in the future. This cannot be done via phone calls and will require continued follow-up with us.  Alternatively, this can be managed by PCP if patient is unableto follow-up with us.

## 2023-04-05 ENCOUNTER — OFFICE VISIT (OUTPATIENT)
Dept: INFECTIOUS DISEASES | Facility: MEDICAL CENTER | Age: 73
End: 2023-04-05
Attending: NURSE PRACTITIONER
Payer: MEDICARE

## 2023-04-05 VITALS
WEIGHT: 108.03 LBS | TEMPERATURE: 98.5 F | BODY MASS INDEX: 18 KG/M2 | HEART RATE: 89 BPM | OXYGEN SATURATION: 98 % | RESPIRATION RATE: 16 BRPM | DIASTOLIC BLOOD PRESSURE: 90 MMHG | HEIGHT: 65 IN | SYSTOLIC BLOOD PRESSURE: 132 MMHG

## 2023-04-05 DIAGNOSIS — K68.12 PSOAS ABSCESS (HCC): ICD-10-CM

## 2023-04-05 PROCEDURE — 99211 OFF/OP EST MAY X REQ PHY/QHP: CPT | Performed by: NURSE PRACTITIONER

## 2023-04-05 PROCEDURE — 99213 OFFICE O/P EST LOW 20 MIN: CPT | Performed by: NURSE PRACTITIONER

## 2023-04-05 RX ORDER — AMOXICILLIN AND CLAVULANATE POTASSIUM 875; 125 MG/1; MG/1
1 TABLET, FILM COATED ORAL 2 TIMES DAILY
Qty: 60 TABLET | Refills: 0 | Status: SHIPPED | OUTPATIENT
Start: 2023-04-05 | End: 2023-05-05

## 2023-04-05 ASSESSMENT — ENCOUNTER SYMPTOMS
BLURRED VISION: 0
SHORTNESS OF BREATH: 0
NERVOUS/ANXIOUS: 0
SPUTUM PRODUCTION: 0
VOMITING: 0
DOUBLE VISION: 0
BRUISES/BLEEDS EASILY: 0
FEVER: 0
COUGH: 0
WEIGHT LOSS: 0
DIZZINESS: 0
WHEEZING: 0
PALPITATIONS: 0
CHILLS: 0
MYALGIAS: 0
HEADACHES: 0
ABDOMINAL PAIN: 0
FOCAL WEAKNESS: 0
NAUSEA: 0

## 2023-04-05 ASSESSMENT — FIBROSIS 4 INDEX: FIB4 SCORE: 1.62

## 2023-04-05 NOTE — PROGRESS NOTES
INFECTIOUS  DISEASE  OUTPATIENT CLINIC  NOTE   Subjective   Primary care provider: Lg Mata M.D..     Reason for Follow Up:   Follow-up for   1. Psoas abscess (HCC)  amoxicillin-clavulanate (AUGMENTIN) 875-125 MG Tab    MR-ABDOMEN-WITH & W/O    CBC WITH DIFFERENTIAL    Comp Metabolic Panel          HPI: Patient previously seen and treated by ID team as inpatient during hospital admission.   Mena Campos is a 72 y.o. female admitted 2/23/2023 as a transfer for worsening right-sided flank pain due to mass. She was found to have right-sided kidney mass on January 10.  She had a biopsy of this mass on February 6.  She went to visit an oncologist today Dr. Campos who saw the biopsy results and noticed it being an abscess.  He sent her to the emergency room right away. CT scan showed over 14 X11 cm multiloculated mass/ psoas abscess.  IR aspiration/drain placement done 2/24. OR Culture Citrobacter koseri ( Ampicillin resistant). Repeat CT on 2/28 showed increase in size of psoas abscess measuring 3.9 x 4.7 cm with pigtail drainage catheter in place.  Psoas abscess was multiloculated.  Drain has minimal output and was discontinued on 3/1.  Remaining abscess is loculated and not connected to the drain, it is currently too small to place a new drain and will hopefully resolve with antibiotics.  Repeat CT Drain replaced on 3/7 however then accidentally removed 3/10, repeat CT AP showed decreasing abscesses and per IR nothing drainable. Discharge with PO Augmentin and pending repeat CT scan     4/1-repeat CT scan shows markedly decreased in size psoas abscess, now measuring 4.5 x 5.5 cm    04/05/23- Today Patient reports feeling well. Pt stating that the wound is healing well. Denies drainage, pungent odor, redness, pain. Denies feeling generally ill, fevers/chills, general malaise, headache, n/v/d.  Tolerating Augmentin with no complaints of side effects.  Continue with p.o. Augmentin 875/125 mg twice daily for  an additional 1 month.  MRI abd/ pelvis in 3-4 weeks due to multiple CT scans in the past 4 months.     Current Antimicrobials: p.o. Augmentin 875/125 mg twice daily until 5/5/2023  Previous Antimicrobials:  Zosyn    Other Current Medications:  Home Medications    Medication Sig Taking? Last Dose Authorizing Provider   amoxicillin-clavulanate (AUGMENTIN) 875-125 MG Tab Take 1 Tablet by mouth 2 times a day for 30 days. Yes  JANA Hawk   METAMUCIL FIBER PO Take  by mouth. Yes Taking Physician Outpatient   sertraline (ZOLOFT) 100 MG Tab Take 1 Tablet by mouth 2 times a day. Yes Taking Lg Mata M.D.   tolterodine ER (DETROL LA) 4 MG CAPSULE SR 24 HR Take 1 Capsule by mouth 2 times a day. Yes Taking Lg Mata M.D.   famotidine (PEPCID) 20 MG Tab TAKE 1 TABLET BY MOUTH 3 TIMES A DAY. Yes Taking Bryan Stewart M.D.   Zinc 50 MG Cap Take 50 mg by mouth every day. Yes Taking Physician Outpatient   Multiple Vitamin (MULTIVITAMIN PO)  Yes Taking Physician Outpatient   B Complex Vitamins (B COMPLEX 50) Tab Take 1 tablet by mouth 2 times a day. Yes Taking Physician Outpatient   FIBER PO Take  by mouth. Yes Taking Physician Outpatient   CALCIUM-PHOSPHORUS PO Take 500 mg by mouth 3 times a day with meals. Indications: Abhay labs Yes Taking Bonifacio Lyon M.D.   Cholecalciferol (VITAMIN D3) 2000 units Tab Take 4,000 Units by mouth every day. Yes Taking Bonifacio Lyon M.D.   Potassium 99 MG Tab Take 2 Tablets by mouth every day. Yes Taking Physician Outpatient   Omega-3 Fatty Acids (FISH OIL) 1200 MG Cap Take 2 Capsules by mouth every day. Yes Taking Physician Outpatient   vitamin e (VITAMIN E) 400 UNIT Cap Take 400 Units by mouth every day. Yes Taking Physician Outpatient   ascorbic acid (ASCORBIC ACID) 500 MG Tab Take 500 mg by mouth 3 times a day. Yes Taking Physician Outpatient        PMH:  Past Medical History:   Diagnosis Date    Zheng's esophagus     Cataract     Depression      Fracture of humeral head, right, closed     fall standing height    Fracture, femur closed, shaft (HCC) 2013    right-fall standing height    GERD (gastroesophageal reflux disease)     H/O measles     History of chickenpox     IBD (inflammatory bowel disease)     Kyphosis     OSTEOPOROSIS     Wrist fracture, left     fall standing height     Past Surgical History:   Procedure Laterality Date    CATARACT EXTRACTION WITH IOL Bilateral 3/2016    ORIF, FRACTURE, FEMUR  2013    right-Rogalski    ORIF, FRACTURE, HUMERUS, PROXIMAL Right     Dr Herrera     Family History   Problem Relation Age of Onset    Osteoporosis Mother     Cancer Father      Social History     Socioeconomic History    Marital status:      Spouse name: Not on file    Number of children: Not on file    Years of education: Not on file    Highest education level: Not on file   Occupational History    Not on file   Tobacco Use    Smoking status: Former     Packs/day: 0.50     Years: 35.00     Pack years: 17.50     Types: Cigarettes     Quit date: 2010     Years since quittin.1    Smokeless tobacco: Never   Vaping Use    Vaping Use: Not on file   Substance and Sexual Activity    Alcohol use: Yes     Alcohol/week: 8.4 oz     Types: 14 Cans of beer per week    Drug use: No    Sexual activity: Not on file   Other Topics Concern    Not on file   Social History Narrative    Not on file     Social Determinants of Health     Financial Resource Strain: Not on file   Food Insecurity: Not on file   Transportation Needs: Not on file   Physical Activity: Not on file   Stress: Not on file   Social Connections: Not on file   Intimate Partner Violence: Not on file   Housing Stability: Not on file           Allergies/Intolerances:  Allergies   Allergen Reactions    Seasonal     Wellbutrin [Bupropion Hcl]      Had a siezure       ROS:   Review of Systems   Constitutional:  Negative for chills, fever, malaise/fatigue and weight loss.   HENT:  " Negative for congestion and hearing loss.    Eyes:  Negative for blurred vision and double vision.   Respiratory:  Negative for cough, sputum production, shortness of breath and wheezing.    Cardiovascular:  Negative for chest pain and palpitations.   Gastrointestinal:  Negative for abdominal pain, nausea and vomiting.   Genitourinary:  Negative for dysuria.   Musculoskeletal:  Negative for myalgias.   Skin:  Negative for itching and rash.   Neurological:  Negative for dizziness, focal weakness and headaches.   Endo/Heme/Allergies:  Does not bruise/bleed easily.   Psychiatric/Behavioral:  The patient is not nervous/anxious.     ROS was reviewed and were negative except as above.    Objective    Most Recent Vital Signs:  Blood Pressure (Abnormal) 132/90 (BP Location: Left arm, Patient Position: Sitting, BP Cuff Size: Adult)   Pulse 89   Temperature 36.9 °C (98.5 °F) (Temporal)   Respiration 16   Height 1.651 m (5' 5\")   Weight 49 kg (108 lb 0.4 oz)   Oxygen Saturation 98%   Body Mass Index 17.98 kg/m²     Physical Exam:  Physical Exam  Vitals and nursing note reviewed.   Constitutional:       General: She is not in acute distress.     Appearance: She is underweight. She is not ill-appearing.   HENT:      Head: Normocephalic and atraumatic.      Nose: Nose normal.      Mouth/Throat:      Mouth: Mucous membranes are moist.   Eyes:      Pupils: Pupils are equal, round, and reactive to light.   Cardiovascular:      Rate and Rhythm: Normal rate and regular rhythm.   Pulmonary:      Effort: Pulmonary effort is normal. No respiratory distress.      Breath sounds: Normal breath sounds. No stridor.   Musculoskeletal:      Cervical back: Normal range of motion.      Right lower leg: No edema.      Left lower leg: No edema.      Comments: Previous psoas drain site CDI, nearly completely healed with no signs of erythema, swelling, drainage   Skin:     General: Skin is warm and dry.      Coloration: Skin is not jaundiced " or pale.   Neurological:      General: No focal deficit present.      Mental Status: She is alert and oriented to person, place, and time.   Psychiatric:         Mood and Affect: Mood normal.         Behavior: Behavior normal.        Pertinent Lab/Imaging Results:  [unfilled]  @CMP@  WBC   Date/Time Value Ref Range Status   03/11/2023 12:03 AM 6.1 4.8 - 10.8 K/uL Final     RBC   Date/Time Value Ref Range Status   03/11/2023 12:03 AM 3.55 (L) 4.20 - 5.40 M/uL Final     Hemoglobin   Date/Time Value Ref Range Status   03/11/2023 12:03 AM 9.3 (L) 12.0 - 16.0 g/dL Final     Hematocrit   Date/Time Value Ref Range Status   03/11/2023 12:03 AM 29.2 (L) 37.0 - 47.0 % Final     MCV   Date/Time Value Ref Range Status   03/11/2023 12:03 AM 82.3 81.4 - 97.8 fL Final     MCH   Date/Time Value Ref Range Status   03/11/2023 12:03 AM 26.2 (L) 27.0 - 33.0 pg Final     MCHC   Date/Time Value Ref Range Status   03/11/2023 12:03 AM 31.8 (L) 33.6 - 35.0 g/dL Final     MPV   Date/Time Value Ref Range Status   03/11/2023 12:03 AM 12.5 9.0 - 12.9 fL Final      Sodium   Date/Time Value Ref Range Status   03/12/2023 12:25  135 - 145 mmol/L Final     Potassium   Date/Time Value Ref Range Status   03/12/2023 12:25 AM 3.5 (L) 3.6 - 5.5 mmol/L Final     Chloride   Date/Time Value Ref Range Status   03/12/2023 12:25  96 - 112 mmol/L Final     Co2   Date/Time Value Ref Range Status   03/12/2023 12:25 AM 23 20 - 33 mmol/L Final     Glucose   Date/Time Value Ref Range Status   03/12/2023 12:25  (H) 65 - 99 mg/dL Final     Bun   Date/Time Value Ref Range Status   03/22/2023 03:24 PM 30 (H) 7 - 18 mg/dL Final     Creatinine   Date/Time Value Ref Range Status   03/22/2023 03:24 PM 1.1 (H) 0.6 - 1.0 mg/dL Final     Bun-Creatinine Ratio   Date/Time Value Ref Range Status   07/22/2015 12:58 PM 25 11 - 26 Final     Alkaline Phosphatase   Date/Time Value Ref Range Status   03/07/2023 12:29 AM 80 30 - 99 U/L Final     AST(SGOT)   Date/Time  Value Ref Range Status   03/07/2023 12:29 AM 16 12 - 45 U/L Final     ALT(SGPT)   Date/Time Value Ref Range Status   03/07/2023 12:29 AM 10 2 - 50 U/L Final     Total Bilirubin   Date/Time Value Ref Range Status   03/07/2023 12:29 AM 0.2 0.1 - 1.5 mg/dL Final      No results found for: CPKTOTAL       No results found for: BLOODCULTU, BLDCULT, BCHOLD    No results found for: BLOODCULTU, BLDCULT, BCHOLD       CULTURE WOUND W/ GRAM STAIN  Order: [id number]  Status: Final result     Visible to patient: No (inaccessible in MyChart)     Next appt: 04/28/2023 at 11:00 AM in Infectious Diseases (Edin Masterson A.P.R.N.)     Specimen Information: Wound   0 Result Notes      Component 1 mo ago   Significant Indicator POS Positive (POS)    Source WND    Site Right Psoas    Culture Result - Abnormal     Gram Stain Result Moderate WBCs.   No organisms seen.    Culture Result  Abnormal   Citrobacter koseri   Moderate growth     Resulting Agency M        Susceptibility     Citrobacter koseri     ROBERTH     Ampicillin >16 mcg/mL Resistant     Ampicillin/sulbactam <=4/2 mcg/mL Sensitive     Cefazolin <=2 mcg/mL Sensitive     Cefepime <=2 mcg/mL Sensitive     Ceftriaxone <=1 mcg/mL Sensitive     Cefuroxime <=4 mcg/mL Sensitive     Ciprofloxacin <=0.25 mcg/mL Sensitive     Ertapenem <=0.5 mcg/mL Sensitive     Gentamicin <=2 mcg/mL Sensitive     Minocycline <=4 mcg/mL Sensitive     Moxifloxacin <=2 mcg/mL Sensitive     Pip/Tazobactam <=8 mcg/mL Sensitive     Tigecycline <=2 mcg/mL Sensitive     Tobramycin <=2 mcg/mL Sensitive     Trimeth/Sulfa <=0.5/9.5 m... Sensitive                 Specimen Collected: 02/24/23  5:00 PM Last Resulted: 02           Impression/Assessment      1. Psoas abscess (HCC)  amoxicillin-clavulanate (AUGMENTIN) 875-125 MG Tab    MR-ABDOMEN-WITH & W/O    CBC WITH DIFFERENTIAL    Comp Metabolic Panel        Mena Campos is a 72 y.o. female admitted 2/23/2023  psoas muscle abscess . CT scan showed over 14 X11  cm multiloculated mass/ psoas abscess.  IR aspiration/drain placement done 2/24. OR Culture Citrobacter koseri ( Ampicillin resistant). Repeat CT on 2/28 showed increase in size of psoas abscess measuring 3.9 x 4.7 cm with pigtail drainage catheter in place.  Psoas abscess was multiloculated.  Drain has minimal output and was discontinued on 3/1.  Discharge with PO Augmentin.   4/1-repeat CT scan shows markedly decreased in size psoas abscess, now measuring 4.5 x 5.5 cm Continue with p.o. Augmentin 875/125 mg twice daily for an additional 1 month (end date 5/5/2023).  MRI abd in 3-4 weeks due to multiple CT scans in the past 4 months.     PLAN:   - Continue with p.o. Augmentin 875/125 mg twice daily for an additional 1 month.    - MRI of Abd with contrast in 3-4 weeks  - Repeat labs CBC/CMP ordered  - Recommend follow-up with GI for further evaluation to rule out cause of infection  -Education provided on signs and symptoms of worsening infection and when to report to ER/call 911  -We will call and discuss MRI results with patient once received and plan if stopping versus continuing antibiotics      Return visit: 1 month tentatively. Follow up with primary care physician for chronic medical problems      I have performed a physical exam,  updated ROS and plan today. I have reviewed previous images, labs, and provider notes.      VI Hawk.    All Patients should seek medical re-evaluation or report to the ER for new, increasing or worsening symptoms. In some circumstances medical conditions can change from the initial evaluation and may require emergent medical re-evaluation. This includes but is not limited to chest pain, shortness of breath, atypical abdominal pain, atypical headache, ALOC, fever >101, low blood pressure, high respiratory rate (above 30), low oxygen saturation (below 90%), acute delirium, abnormal bleeding, inability to tolerate any intake, weakness on one side of the body, any  worsened or concerning conditions.    Please note that this dictation was created using voice recognition software. I have worked with technical experts from [facility] to optimize the interface.  I have made every reasonable attempt to correct obvious errors, but there may be errors of grammar and possibly content that I did not discover before finalizing the note.

## 2023-04-26 ENCOUNTER — TELEPHONE (OUTPATIENT)
Dept: INFECTIOUS DISEASES | Facility: MEDICAL CENTER | Age: 73
End: 2023-04-26
Payer: MEDICARE

## 2023-04-26 NOTE — TELEPHONE ENCOUNTER
----- Message from JANA Hawk sent at 4/26/2023  4:05 PM PDT -----  I tried calling twice with no answer. Can you call this patient and inform her that her MRI shows resolution of right psoas abscess.  She can stop her oral Augmentin if she would like or she can finish out her course which is supposed to end 5/5/2023.  If she has a follow-up appointment you can cancel it and she can follow-up with us as needed. Thanks      ----- Message -----  From: Jane Popeant In  Sent: 4/26/2023   1:44 PM PDT  To: JANA Hawk

## 2023-04-27 NOTE — TELEPHONE ENCOUNTER
Spoke with patient and relayed APRN message. Patient is excited and thankful the MRI showed positive results. Patient will finish course of abx ending 5/5. Appt for tomorrow cancel since patient also has [employer] insurance out of network and lives in [place].

## 2023-04-28 ENCOUNTER — APPOINTMENT (OUTPATIENT)
Dept: INFECTIOUS DISEASES | Facility: MEDICAL CENTER | Age: 73
End: 2023-04-28
Attending: NURSE PRACTITIONER
Payer: MEDICARE